# Patient Record
Sex: MALE | Race: WHITE | NOT HISPANIC OR LATINO | URBAN - METROPOLITAN AREA
[De-identification: names, ages, dates, MRNs, and addresses within clinical notes are randomized per-mention and may not be internally consistent; named-entity substitution may affect disease eponyms.]

---

## 2020-08-17 ENCOUNTER — INPATIENT HOSPITAL (OUTPATIENT)
Dept: URBAN - METROPOLITAN AREA HOSPITAL 107 | Facility: HOSPITAL | Age: 55
End: 2020-08-17
Payer: COMMERCIAL

## 2020-08-17 DIAGNOSIS — K29.50 UNSPECIFIED CHRONIC GASTRITIS WITHOUT BLEEDING: ICD-10-CM

## 2020-08-17 DIAGNOSIS — K92.0 HEMATEMESIS: ICD-10-CM

## 2020-08-17 DIAGNOSIS — K21.0 GASTRO-ESOPHAGEAL REFLUX DISEASE WITH ESOPHAGITIS: ICD-10-CM

## 2020-08-17 DIAGNOSIS — F10.10 ALCOHOL ABUSE, UNCOMPLICATED: ICD-10-CM

## 2020-08-17 PROCEDURE — 43239 EGD BIOPSY SINGLE/MULTIPLE: CPT | Performed by: INTERNAL MEDICINE

## 2020-09-05 ENCOUNTER — INPATIENT HOSPITAL (OUTPATIENT)
Dept: URBAN - METROPOLITAN AREA HOSPITAL 107 | Facility: HOSPITAL | Age: 55
End: 2020-09-05
Payer: COMMERCIAL

## 2020-09-05 DIAGNOSIS — F10.10 ALCOHOL ABUSE, UNCOMPLICATED: ICD-10-CM

## 2020-09-05 DIAGNOSIS — K21.0 GASTRO-ESOPHAGEAL REFLUX DISEASE WITH ESOPHAGITIS: ICD-10-CM

## 2020-09-05 DIAGNOSIS — F19.10 OTHER PSYCHOACTIVE SUBSTANCE ABUSE, UNCOMPLICATED: ICD-10-CM

## 2020-09-05 DIAGNOSIS — R93.3 ABNORMAL FINDINGS ON DIAGNOSTIC IMAGING OF OTHER PARTS OF DI: ICD-10-CM

## 2020-09-05 DIAGNOSIS — K76.0 FATTY (CHANGE OF) LIVER, NOT ELSEWHERE CLASSIFIED: ICD-10-CM

## 2020-09-05 PROCEDURE — 99222 1ST HOSP IP/OBS MODERATE 55: CPT | Performed by: INTERNAL MEDICINE

## 2020-09-06 ENCOUNTER — INPATIENT HOSPITAL (OUTPATIENT)
Dept: URBAN - METROPOLITAN AREA HOSPITAL 107 | Facility: HOSPITAL | Age: 55
End: 2020-09-06
Payer: COMMERCIAL

## 2020-09-06 DIAGNOSIS — K21.0 GASTRO-ESOPHAGEAL REFLUX DISEASE WITH ESOPHAGITIS: ICD-10-CM

## 2020-09-06 DIAGNOSIS — B18.2 CHRONIC VIRAL HEPATITIS C: ICD-10-CM

## 2020-09-06 PROCEDURE — 99232 SBSQ HOSP IP/OBS MODERATE 35: CPT | Performed by: INTERNAL MEDICINE

## 2020-09-08 ENCOUNTER — INPATIENT HOSPITAL (OUTPATIENT)
Dept: URBAN - METROPOLITAN AREA HOSPITAL 107 | Facility: HOSPITAL | Age: 55
End: 2020-09-08
Payer: COMMERCIAL

## 2020-09-08 DIAGNOSIS — I26.99 OTHER PULMONARY EMBOLISM WITHOUT ACUTE COR PULMONALE: ICD-10-CM

## 2020-09-08 DIAGNOSIS — B19.20 UNSPECIFIED VIRAL HEPATITIS C WITHOUT HEPATIC COMA: ICD-10-CM

## 2020-09-08 DIAGNOSIS — K21.0 GASTRO-ESOPHAGEAL REFLUX DISEASE WITH ESOPHAGITIS: ICD-10-CM

## 2020-09-08 DIAGNOSIS — K92.0 HEMATEMESIS: ICD-10-CM

## 2020-09-08 DIAGNOSIS — K92.2 GASTROINTESTINAL HEMORRHAGE, UNSPECIFIED: ICD-10-CM

## 2020-09-08 DIAGNOSIS — R10.13 EPIGASTRIC PAIN: ICD-10-CM

## 2020-09-08 PROCEDURE — 99232 SBSQ HOSP IP/OBS MODERATE 35: CPT | Performed by: PHYSICIAN ASSISTANT

## 2021-08-25 PROCEDURE — 99283 EMERGENCY DEPT VISIT LOW MDM: CPT

## 2021-08-25 RX ORDER — SODIUM CHLORIDE 0.9 % (FLUSH) 0.9 %
10 SYRINGE (ML) INJECTION AS NEEDED
Status: DISCONTINUED | OUTPATIENT
Start: 2021-08-25 | End: 2021-08-26 | Stop reason: HOSPADM

## 2021-08-26 ENCOUNTER — APPOINTMENT (OUTPATIENT)
Dept: CT IMAGING | Facility: HOSPITAL | Age: 56
End: 2021-08-26

## 2021-08-26 ENCOUNTER — HOSPITAL ENCOUNTER (EMERGENCY)
Facility: HOSPITAL | Age: 56
Discharge: HOME | End: 2021-08-26
Attending: EMERGENCY MEDICINE | Admitting: EMERGENCY MEDICINE

## 2021-08-26 VITALS
RESPIRATION RATE: 18 BRPM | WEIGHT: 110 LBS | HEART RATE: 84 BPM | SYSTOLIC BLOOD PRESSURE: 113 MMHG | BODY MASS INDEX: 15.75 KG/M2 | OXYGEN SATURATION: 94 % | DIASTOLIC BLOOD PRESSURE: 78 MMHG | TEMPERATURE: 98.2 F | HEIGHT: 70 IN

## 2021-08-26 DIAGNOSIS — K20.90 ESOPHAGITIS: Primary | ICD-10-CM

## 2021-08-26 DIAGNOSIS — R10.84 GENERALIZED ABDOMINAL PAIN: ICD-10-CM

## 2021-08-26 LAB
ALBUMIN SERPL-MCNC: 4.6 G/DL (ref 3.5–5.2)
ALBUMIN/GLOB SERPL: 1.3 G/DL
ALP SERPL-CCNC: 112 U/L (ref 39–117)
ALT SERPL W P-5'-P-CCNC: 31 U/L (ref 1–41)
AMMONIA BLD-SCNC: 30 UMOL/L (ref 16–60)
ANION GAP SERPL CALCULATED.3IONS-SCNC: 8.9 MMOL/L (ref 5–15)
AST SERPL-CCNC: 23 U/L (ref 1–40)
BASOPHILS # BLD AUTO: 0.07 10*3/MM3 (ref 0–0.2)
BASOPHILS NFR BLD AUTO: 0.8 % (ref 0–1.5)
BILIRUB SERPL-MCNC: 0.9 MG/DL (ref 0–1.2)
BILIRUB UR QL STRIP: NEGATIVE
BUN SERPL-MCNC: 26 MG/DL (ref 6–20)
BUN/CREAT SERPL: 20.8 (ref 7–25)
CALCIUM SPEC-SCNC: 9.5 MG/DL (ref 8.6–10.5)
CHLORIDE SERPL-SCNC: 102 MMOL/L (ref 98–107)
CLARITY UR: CLEAR
CO2 SERPL-SCNC: 25.1 MMOL/L (ref 22–29)
COLOR UR: YELLOW
CREAT SERPL-MCNC: 1.25 MG/DL (ref 0.76–1.27)
D-LACTATE SERPL-SCNC: 0.9 MMOL/L (ref 0.5–2)
DEPRECATED RDW RBC AUTO: 45.9 FL (ref 37–54)
EOSINOPHIL # BLD AUTO: 0.21 10*3/MM3 (ref 0–0.4)
EOSINOPHIL NFR BLD AUTO: 2.5 % (ref 0.3–6.2)
ERYTHROCYTE [DISTWIDTH] IN BLOOD BY AUTOMATED COUNT: 14.1 % (ref 12.3–15.4)
GFR SERPL CREATININE-BSD FRML MDRD: 60 ML/MIN/1.73
GLOBULIN UR ELPH-MCNC: 3.6 GM/DL
GLUCOSE SERPL-MCNC: 90 MG/DL (ref 65–99)
GLUCOSE UR STRIP-MCNC: NEGATIVE MG/DL
HCT VFR BLD AUTO: 44.8 % (ref 37.5–51)
HGB BLD-MCNC: 14.7 G/DL (ref 13–17.7)
HGB UR QL STRIP.AUTO: NEGATIVE
HOLD SPECIMEN: NORMAL
HOLD SPECIMEN: NORMAL
IMM GRANULOCYTES # BLD AUTO: 0.02 10*3/MM3 (ref 0–0.05)
IMM GRANULOCYTES NFR BLD AUTO: 0.2 % (ref 0–0.5)
KETONES UR QL STRIP: NEGATIVE
LEUKOCYTE ESTERASE UR QL STRIP.AUTO: NEGATIVE
LIPASE SERPL-CCNC: 94 U/L (ref 13–60)
LYMPHOCYTES # BLD AUTO: 2.43 10*3/MM3 (ref 0.7–3.1)
LYMPHOCYTES NFR BLD AUTO: 28.7 % (ref 19.6–45.3)
MCH RBC QN AUTO: 29.4 PG (ref 26.6–33)
MCHC RBC AUTO-ENTMCNC: 32.8 G/DL (ref 31.5–35.7)
MCV RBC AUTO: 89.6 FL (ref 79–97)
MONOCYTES # BLD AUTO: 1.16 10*3/MM3 (ref 0.1–0.9)
MONOCYTES NFR BLD AUTO: 13.7 % (ref 5–12)
NEUTROPHILS NFR BLD AUTO: 4.58 10*3/MM3 (ref 1.7–7)
NEUTROPHILS NFR BLD AUTO: 54.1 % (ref 42.7–76)
NITRITE UR QL STRIP: NEGATIVE
NRBC BLD AUTO-RTO: 0 /100 WBC (ref 0–0.2)
PH UR STRIP.AUTO: 5.5 [PH] (ref 5–8)
PLATELET # BLD AUTO: 616 10*3/MM3 (ref 140–450)
PMV BLD AUTO: 9.4 FL (ref 6–12)
POTASSIUM SERPL-SCNC: 4.2 MMOL/L (ref 3.5–5.2)
PROT SERPL-MCNC: 8.2 G/DL (ref 6–8.5)
PROT UR QL STRIP: NEGATIVE
RBC # BLD AUTO: 5 10*6/MM3 (ref 4.14–5.8)
SODIUM SERPL-SCNC: 136 MMOL/L (ref 136–145)
SP GR UR STRIP: 1.02 (ref 1–1.03)
UROBILINOGEN UR QL STRIP: NORMAL
WBC # BLD AUTO: 8.47 10*3/MM3 (ref 3.4–10.8)
WHOLE BLOOD HOLD SPECIMEN: NORMAL
WHOLE BLOOD HOLD SPECIMEN: NORMAL

## 2021-08-26 PROCEDURE — 80053 COMPREHEN METABOLIC PANEL: CPT

## 2021-08-26 PROCEDURE — 85025 COMPLETE CBC W/AUTO DIFF WBC: CPT

## 2021-08-26 PROCEDURE — 25010000002 IOPAMIDOL 61 % SOLUTION: Performed by: EMERGENCY MEDICINE

## 2021-08-26 PROCEDURE — 74177 CT ABD & PELVIS W/CONTRAST: CPT

## 2021-08-26 PROCEDURE — 81003 URINALYSIS AUTO W/O SCOPE: CPT | Performed by: EMERGENCY MEDICINE

## 2021-08-26 PROCEDURE — 83605 ASSAY OF LACTIC ACID: CPT

## 2021-08-26 PROCEDURE — 83690 ASSAY OF LIPASE: CPT

## 2021-08-26 PROCEDURE — 82140 ASSAY OF AMMONIA: CPT

## 2021-08-26 RX ORDER — FAMOTIDINE 40 MG/1
40 TABLET, FILM COATED ORAL DAILY
Qty: 30 TABLET | Refills: 0 | Status: SHIPPED | OUTPATIENT
Start: 2021-08-26 | End: 2021-09-15 | Stop reason: HOSPADM

## 2021-08-26 RX ORDER — LIDOCAINE HYDROCHLORIDE 20 MG/ML
15 SOLUTION OROPHARYNGEAL ONCE
Status: COMPLETED | OUTPATIENT
Start: 2021-08-26 | End: 2021-08-26

## 2021-08-26 RX ORDER — ONDANSETRON 4 MG/1
4 TABLET, ORALLY DISINTEGRATING ORAL EVERY 6 HOURS PRN
Qty: 12 TABLET | Refills: 0 | Status: SHIPPED | OUTPATIENT
Start: 2021-08-26 | End: 2021-09-15 | Stop reason: HOSPADM

## 2021-08-26 RX ORDER — ALUMINA, MAGNESIA, AND SIMETHICONE 2400; 2400; 240 MG/30ML; MG/30ML; MG/30ML
15 SUSPENSION ORAL ONCE
Status: COMPLETED | OUTPATIENT
Start: 2021-08-26 | End: 2021-08-26

## 2021-08-26 RX ORDER — SODIUM CHLORIDE 0.9 % (FLUSH) 0.9 %
10 SYRINGE (ML) INJECTION AS NEEDED
Status: DISCONTINUED | OUTPATIENT
Start: 2021-08-26 | End: 2021-08-26 | Stop reason: HOSPADM

## 2021-08-26 RX ORDER — QUETIAPINE FUMARATE 300 MG/1
300 TABLET, FILM COATED ORAL NIGHTLY
Status: ON HOLD | COMMUNITY
End: 2021-09-15 | Stop reason: SDUPTHER

## 2021-08-26 RX ORDER — MULTIPLE VITAMINS W/ MINERALS TAB 9MG-400MCG
1 TAB ORAL DAILY
Status: ON HOLD | COMMUNITY
End: 2021-09-15 | Stop reason: SDUPTHER

## 2021-08-26 RX ADMIN — SODIUM CHLORIDE 1000 ML: 9 INJECTION, SOLUTION INTRAVENOUS at 07:32

## 2021-08-26 RX ADMIN — LIDOCAINE HYDROCHLORIDE 15 ML: 20 SOLUTION ORAL; TOPICAL at 10:51

## 2021-08-26 RX ADMIN — MAGNESIUM HYDROXIDE,ALUMINUM HYDROXICE,SIMETHICONE 15 ML: 240; 2400; 2400 SUSPENSION ORAL at 10:50

## 2021-08-26 RX ADMIN — IOPAMIDOL 85 ML: 612 INJECTION, SOLUTION INTRAVENOUS at 08:01

## 2021-08-26 NOTE — ED NOTES
Gave pt cup for urine. States he cannot go at this time.         Zabrina Payton, RN  08/26/21 0431

## 2021-08-26 NOTE — DISCHARGE INSTRUCTIONS
Medications as ordered  Attempted to stop drinking alcohol or at least slow down  Increase fluids, diet as tolerated  Follow up with PMD and GI of your choice may need a upper GI scope if her symptoms persist  Return to the ER for fever, chills, chest pain, shortness of breath, nausea, vomiting, diarrhea, worsening abdominal pain or any new or worsening symptoms    Community Clinics available for follow up:      [facility] [place]             [address] [facility]  [phone]    [facility]  [address]  [phone]    [address]  [phone]    [facility]  [address]  [phone]    [facility]  [address] [facility]  [phone]    [facility]  [address]  [phone]    [address]  (off [facility])  [phone]    [facility] [address]  [phone]    [facility]  [address] [facility]  [phone]    [facility]  [phone]    [facility]  [address]  [phone]    [facility] [place]  [address] [facility]  [phone]    [facility]  [address]  [phone]

## 2021-08-26 NOTE — ED PROVIDER NOTES
Brief history of present illness: 56-year-old chronic alcoholic with history of hepatitis presents emergency department with rather diffuse abdominal discomfort complaints.    Physical exam:     ED Triage Vitals   Temp Heart Rate Resp BP SpO2   08/25/21 2235 08/25/21 2232 08/25/21 2232 08/26/21 0433 08/25/21 2232   98.1 °F (36.7 °C) 97 18 108/68 97 %      Temp src Heart Rate Source Patient Position BP Location FiO2 (%)   08/25/21 2235 08/25/21 2232 08/26/21 0433 08/26/21 0433 --   Tympanic Monitor Sitting Left arm      Alert and talkative.  Not overtly toxic.  Chronically ill-appearing.  Diffuse discomfort with palpation without rebound or rigidity of the abdomen noted.  No respiratory distress or tachypnea.  Pink warm well perfused.  Moves all extremities equally.    MDM:    Results for orders placed or performed during the hospital encounter of 08/26/21   Comprehensive Metabolic Panel    Specimen: Blood   Result Value Ref Range    Glucose 90 65 - 99 mg/dL    BUN 26 (H) 6 - 20 mg/dL    Creatinine 1.25 0.76 - 1.27 mg/dL    Sodium 136 136 - 145 mmol/L    Potassium 4.2 3.5 - 5.2 mmol/L    Chloride 102 98 - 107 mmol/L    CO2 25.1 22.0 - 29.0 mmol/L    Calcium 9.5 8.6 - 10.5 mg/dL    Total Protein 8.2 6.0 - 8.5 g/dL    Albumin 4.60 3.50 - 5.20 g/dL    ALT (SGPT) 31 1 - 41 U/L    AST (SGOT) 23 1 - 40 U/L    Alkaline Phosphatase 112 39 - 117 U/L    Total Bilirubin 0.9 0.0 - 1.2 mg/dL    eGFR Non African Amer 60 (L) >60 mL/min/1.73    Globulin 3.6 gm/dL    A/G Ratio 1.3 g/dL    BUN/Creatinine Ratio 20.8 7.0 - 25.0    Anion Gap 8.9 5.0 - 15.0 mmol/L   Lipase    Specimen: Blood   Result Value Ref Range    Lipase 94 (H) 13 - 60 U/L   Lactic Acid, Plasma    Specimen: Blood   Result Value Ref Range    Lactate 0.9 0.5 - 2.0 mmol/L   CBC Auto Differential    Specimen: Blood   Result Value Ref Range    WBC 8.47 3.40 - 10.80 10*3/mm3    RBC 5.00 4.14 - 5.80 10*6/mm3    Hemoglobin 14.7 13.0 - 17.7 g/dL    Hematocrit 44.8 37.5 - 51.0  %    MCV 89.6 79.0 - 97.0 fL    MCH 29.4 26.6 - 33.0 pg    MCHC 32.8 31.5 - 35.7 g/dL    RDW 14.1 12.3 - 15.4 %    RDW-SD 45.9 37.0 - 54.0 fl    MPV 9.4 6.0 - 12.0 fL    Platelets 616 (H) 140 - 450 10*3/mm3    Neutrophil % 54.1 42.7 - 76.0 %    Lymphocyte % 28.7 19.6 - 45.3 %    Monocyte % 13.7 (H) 5.0 - 12.0 %    Eosinophil % 2.5 0.3 - 6.2 %    Basophil % 0.8 0.0 - 1.5 %    Immature Grans % 0.2 0.0 - 0.5 %    Neutrophils, Absolute 4.58 1.70 - 7.00 10*3/mm3    Lymphocytes, Absolute 2.43 0.70 - 3.10 10*3/mm3    Monocytes, Absolute 1.16 (H) 0.10 - 0.90 10*3/mm3    Eosinophils, Absolute 0.21 0.00 - 0.40 10*3/mm3    Basophils, Absolute 0.07 0.00 - 0.20 10*3/mm3    Immature Grans, Absolute 0.02 0.00 - 0.05 10*3/mm3    nRBC 0.0 0.0 - 0.2 /100 WBC   Ammonia    Specimen: Blood   Result Value Ref Range    Ammonia 30 16 - 60 umol/L   Green Top (Gel)   Result Value Ref Range    Extra Tube Hold for add-ons.    Lavender Top   Result Value Ref Range    Extra Tube hold for add-on    Gold Top - SST   Result Value Ref Range    Extra Tube Hold for add-ons.    Light Blue Top   Result Value Ref Range    Extra Tube hold for add-on      Agree with plan for radiologic imaging in this patient to evaluate for acute life threat.    I have seen and personally evaluated this patient, discussed the case with the treating advanced practice provider, and reviewed their note. I was involved in the medical decision making during the evaluation, testing and disposition planning for this patient.     Jensen Anders MD  08/26/21 0780

## 2021-08-26 NOTE — ED NOTES
This RN wearing all appropriate PPE during patient encounter. Hand hygiene performed before and during entering room.       Sangeeta Cosme, RN  08/26/21 0779

## 2021-08-26 NOTE — ED NOTES
This RN wearing all appropriate PPE during patient encounter. Hand hygiene performed before and during entering room.       Sangeeta Cosme, RN  08/26/21 0841

## 2021-08-26 NOTE — ED PROVIDER NOTES
EMERGENCY DEPARTMENT ENCOUNTER    Room Number:  04/04  Date of encounter:  8/26/2021  PCP: Provider, No Known  Historian: Patient      PPE    Patient was placed in face mask in first look. Patient was wearing facemask when I entered the room and throughout our encounter. I wore full protective equipment throughout this patient encounter including a face mask, and gloves. Hand hygiene was performed before donning protective equipment and after removal when leaving the room.        HPI:  Chief Complaint: Abdominal pain  A complete HPI/ROS/PMH/PSH/SH/FH are unobtainable due to: Nothing    Context: David Villalta is a 56 y.o. male who arrives to the ED via private vehicle.  Patient presents with c/o mild, intermittent, dull diffuse abdominal pain for the past week.   Patient also complains of vomiting.  Patient denies fever, chills, diarrhea, dysuria.  Patient states that nothing makes the symptoms better and nothing worsens symptoms. Patient states he is not had pain like this in the past. He states that he does drink alcohol but not every day.        PAST MEDICAL HISTORY  Active Ambulatory Problems     Diagnosis Date Noted   • No Active Ambulatory Problems     Resolved Ambulatory Problems     Diagnosis Date Noted   • No Resolved Ambulatory Problems     Past Medical History:   Diagnosis Date   • Anxiety    • Depression    • Hepatitis C    • Hx of drug abuse (CMS/HCC)          PAST SURGICAL HISTORY  Past Surgical History:   Procedure Laterality Date   • ANKLE SURGERY     • SPLENECTOMY           FAMILY HISTORY  History reviewed. No pertinent family history.      SOCIAL HISTORY  Social History     Socioeconomic History   • Marital status: Unknown     Spouse name: Not on file   • Number of children: Not on file   • Years of education: Not on file   • Highest education level: Not on file   Tobacco Use   • Smoking status: Current Every Day Smoker   • Smokeless tobacco: Never Used   Substance and Sexual Activity   • Alcohol  "use: Yes     Comment: \"3 cases of beer a week & 5-6 bottles of wine. Sometimes some 5ths.\"   • Drug use: Yes     Comment: \"meth\"   • Sexual activity: Defer         ALLERGIES  Penicillin g        REVIEW OF SYSTEMS  Review of Systems     All systems reviewed and negative except for those discussed in HPI.        PHYSICAL EXAM    ED Triage Vitals   Temp Heart Rate Resp BP SpO2   08/25/21 2235 08/25/21 2232 08/25/21 2232 08/26/21 0433 08/25/21 2232   98.1 °F (36.7 °C) 97 18 108/68 97 %       Physical Exam  GENERAL: Well appearing, non-toxic appearing, not distressed  HENT: normocephalic, atraumatic  EYES: no scleral icterus, PERRL  CV: regular rhythm, regular rate, no murmur  RESPIRATORY: normal effort, CTAB  ABDOMEN: soft, normal bowel sounds, diffuse left sided abdominal pain, no rebound, guarding or rigidity  No CVA or flank tenderness bilaterally  MUSCULOSKELETAL: no deformity  NEURO: alert, moves all extremities, follows commands, mental status normal/baseline  SKIN: warm, dry, no rash   Psych: Appropriate mood and affect  Nursing notes and vital signs reviewed      LAB RESULTS  Recent Results (from the past 24 hour(s))   Comprehensive Metabolic Panel    Collection Time: 08/26/21  4:28 AM    Specimen: Blood   Result Value Ref Range    Glucose 90 65 - 99 mg/dL    BUN 26 (H) 6 - 20 mg/dL    Creatinine 1.25 0.76 - 1.27 mg/dL    Sodium 136 136 - 145 mmol/L    Potassium 4.2 3.5 - 5.2 mmol/L    Chloride 102 98 - 107 mmol/L    CO2 25.1 22.0 - 29.0 mmol/L    Calcium 9.5 8.6 - 10.5 mg/dL    Total Protein 8.2 6.0 - 8.5 g/dL    Albumin 4.60 3.50 - 5.20 g/dL    ALT (SGPT) 31 1 - 41 U/L    AST (SGOT) 23 1 - 40 U/L    Alkaline Phosphatase 112 39 - 117 U/L    Total Bilirubin 0.9 0.0 - 1.2 mg/dL    eGFR Non African Amer 60 (L) >60 mL/min/1.73    Globulin 3.6 gm/dL    A/G Ratio 1.3 g/dL    BUN/Creatinine Ratio 20.8 7.0 - 25.0    Anion Gap 8.9 5.0 - 15.0 mmol/L   Lipase    Collection Time: 08/26/21  4:28 AM    Specimen: Blood "   Result Value Ref Range    Lipase 94 (H) 13 - 60 U/L   Lactic Acid, Plasma    Collection Time: 08/26/21  4:28 AM    Specimen: Blood   Result Value Ref Range    Lactate 0.9 0.5 - 2.0 mmol/L   Green Top (Gel)    Collection Time: 08/26/21  4:28 AM   Result Value Ref Range    Extra Tube Hold for add-ons.    Lavender Top    Collection Time: 08/26/21  4:28 AM   Result Value Ref Range    Extra Tube hold for add-on    Gold Top - SST    Collection Time: 08/26/21  4:28 AM   Result Value Ref Range    Extra Tube Hold for add-ons.    Light Blue Top    Collection Time: 08/26/21  4:28 AM   Result Value Ref Range    Extra Tube hold for add-on    CBC Auto Differential    Collection Time: 08/26/21  4:28 AM    Specimen: Blood   Result Value Ref Range    WBC 8.47 3.40 - 10.80 10*3/mm3    RBC 5.00 4.14 - 5.80 10*6/mm3    Hemoglobin 14.7 13.0 - 17.7 g/dL    Hematocrit 44.8 37.5 - 51.0 %    MCV 89.6 79.0 - 97.0 fL    MCH 29.4 26.6 - 33.0 pg    MCHC 32.8 31.5 - 35.7 g/dL    RDW 14.1 12.3 - 15.4 %    RDW-SD 45.9 37.0 - 54.0 fl    MPV 9.4 6.0 - 12.0 fL    Platelets 616 (H) 140 - 450 10*3/mm3    Neutrophil % 54.1 42.7 - 76.0 %    Lymphocyte % 28.7 19.6 - 45.3 %    Monocyte % 13.7 (H) 5.0 - 12.0 %    Eosinophil % 2.5 0.3 - 6.2 %    Basophil % 0.8 0.0 - 1.5 %    Immature Grans % 0.2 0.0 - 0.5 %    Neutrophils, Absolute 4.58 1.70 - 7.00 10*3/mm3    Lymphocytes, Absolute 2.43 0.70 - 3.10 10*3/mm3    Monocytes, Absolute 1.16 (H) 0.10 - 0.90 10*3/mm3    Eosinophils, Absolute 0.21 0.00 - 0.40 10*3/mm3    Basophils, Absolute 0.07 0.00 - 0.20 10*3/mm3    Immature Grans, Absolute 0.02 0.00 - 0.05 10*3/mm3    nRBC 0.0 0.0 - 0.2 /100 WBC   Ammonia    Collection Time: 08/26/21  4:28 AM    Specimen: Blood   Result Value Ref Range    Ammonia 30 16 - 60 umol/L   Urinalysis With Microscopic If Indicated (No Culture) - Urine, Clean Catch    Collection Time: 08/26/21  8:13 AM    Specimen: Urine, Clean Catch   Result Value Ref Range    Color, UA Yellow Yellow,  Straw    Appearance, UA Clear Clear    pH, UA 5.5 5.0 - 8.0    Specific Gravity, UA 1.021 1.005 - 1.030    Glucose, UA Negative Negative    Ketones, UA Negative Negative    Bilirubin, UA Negative Negative    Blood, UA Negative Negative    Protein, UA Negative Negative    Leuk Esterase, UA Negative Negative    Nitrite, UA Negative Negative    Urobilinogen, UA 0.2 E.U./dL 0.2 - 1.0 E.U./dL       Ordered the above labs and independently reviewed the results.      RADIOLOGY  CT Abdomen Pelvis With Contrast    Result Date: 8/26/2021  CT ABDOMEN AND PELVIS WITH IV CONTRAST  HISTORY: Left-sided abdominal pain, nausea; elevated lipase  TECHNIQUE: Radiation dose reduction techniques were utilized, including automated exposure control and exposure modulation based on body size. 3 mm images were obtained through the abdomen and pelvis after the administration of IV contrast.  COMPARISON: None  FINDINGS: Evaluation is moderately suboptimal due to respiratory motion artifact.  The mid to distal esophagus has a mildly thickened appearance; however, is incompletely visualized.  There are no findings of small bowel obstruction. The appendix is unremarkable. The liver, gallbladder, pancreas, adrenal glands and kidneys have an unremarkable postcontrast CT appearance. There is no hydronephrosis.  Postsurgical changes from prior splenectomy are present. There are multiple nodular soft tissue densities throughout the splenectomy bed, likely represent splenosis.  A few mildly enlarged orquidea hepatic node measuring to 1.1 cm are present. There is also an enlarged left common femoral node measuring 1.3 cm in short axis dimension. There is no free intraperitoneal air or fluid.  No suspicious lytic or blastic osseous lesion is present.      1.  Evaluation of the upper abdomen is mild to moderately suboptimal due to respiratory motion artifact. 2.  No focal abnormality is visualized involving the pancreas; however, please note that mild and  early pancreatitis can remain occult on CT. 3.  Apparent mild thickening of the distal esophagus which is incompletely visualized. Findings are suggestive of esophagitis in the appropriate clinical context and correlation with patient history is recommended with follow-up endoscopy if clinically indicated. 4.  Mildly enlarged orquidea hepatic and left pelvic nodes which in the absence of known malignancy are favored be benign; however, they remain indeterminate. Follow-up with CT abdomen and pelvis in 3 months is recommended to ensure stability. 5.  Other findings as above.  This report was finalized on 8/26/2021 8:35 AM by Dr. Fermín Hernandez M.D.        I ordered the above noted radiological studies and viewed the images on the PACS system.         MEDICAL RECORD REVIEW  No relevant medical records reviewed in epic      PROCEDURES    Procedures        DIFFERENTIAL DIAGNOSIS  Differential diagnosis for abdominal pain include but are not limited to the following:    -Hepatobiliary pathology such as cholecystitis, cholangitis, and symptomatic cholelithiasis  -GERD  -Pancreatitis  -Small or large bowel obstruction  -Appendicitis  -Diverticulitis  -UTI including pyelonephritis  -Ureteral stone  -Zoster  -Colitis, including infectious and ischemic  -Atypical ACS              PROGRESS, DATA ANALYSIS, CONSULTS, AND MEDICAL DECISION MAKING        ED Course as of Aug 26 1614   Thu Aug 26, 2021   0709 Discussed pertinent information from history and physical exam with patient.  Discussed differential diagnosis and plan for ED evaluation/work-up and treatment including labs, urinalysis and CT of the abdomen and pelvis to evaluate for causes of left-sided abdominal tenderness..  All questions answered.  Patient is agreeable with this plan.        [MS]   0800 Reviewed pt's history and workup with Dr. Anders.  After a bedside evaluation, they agree with the plan of care.          [MS]   1029 Lipase(!): 94 [MS]   1034 I informed the  patient of the results of the testing done here in the ER today.  Patient's repeat exam, test results and history are not concerning for serious etiology of their abdominal pain.  Did discuss possible esophagitis that was seen on the CAT scan, this could be related to his alcohol use.  Discussed with patient that he should attempt to slow down and even stop drinking.  I explained to the patient the initial treatment plan and I instructed the patient to return to the emergency department if fever develops, worsening of pain, not able to tolerate liquids or worsening of symptoms.  Instructed the patient to follow-up with her physician or specialist for further evaluation, possibly upper endoscopy, he should call to schedule appointments for next week.  The patient understands and agrees with the treatment plan.            [MS]      ED Course User Index  [MS] Lisa Wasserman APRN     Discussed plan for discharge, as there is no emergent indication for admission. Pt/family is agreeable and understands need for follow up and repeat testing.  Pt is aware that discharge does not mean that nothing is wrong but it indicates no emergency is present that requires admission and they must continue care with follow-up as given below or physician of their choice.   Patient/Family voiced understanding of above instructions.  Patient discharged in stable condition.    DIAGNOSIS  Final diagnoses:   Esophagitis   Generalized abdominal pain       FOLLOW UP   PATIENT CONNECTION - [place] [id number] [phone]  Schedule an appointment as soon as possible for a visit in 1 week      Rai Garcias MD  [address] [facility]  [address] [address]  [phone]    Schedule an appointment as soon as possible for a visit        RX     Medication List      New Prescriptions    famotidine 40 MG tablet  Commonly known as: PEPCID  Take 1 tablet by mouth Daily.     ondansetron ODT 4 MG disintegrating  tablet  Commonly known as: ZOFRAN-ODT  Place 1 tablet on the tongue Every 6 (Six) Hours As Needed for Nausea or Vomiting.           Where to Get Your Medications      You can get these medications from any pharmacy    Bring a paper prescription for each of these medications  · famotidine 40 MG tablet  · ondansetron ODT 4 MG disintegrating tablet             MEDICATIONS GIVEN IN ED    Medications   sodium chloride 0.9 % bolus 1,000 mL (0 mL Intravenous Stopped 8/26/21 1101)   iopamidol (ISOVUE-300) 61 % injection 100 mL (85 mL Intravenous Given 8/26/21 0801)   aluminum-magnesium hydroxide-simethicone (MAALOX MAX) 400-400-40 MG/5ML suspension 15 mL (15 mL Oral Given 8/26/21 1050)   Lidocaine Viscous HCl (XYLOCAINE) 2 % mouth solution 15 mL (15 mL Mouth/Throat Given 8/26/21 1051)           COURSE & MEDICAL DECISION MAKING  Any/All labs and Any/All Imaging studies that were ordered were reviewed and are noted above.  Results were reviewed/discussed with the patient and they were also made aware of online access.    Pt also made aware that some labs, such as cultures, will not be resulted during ER visit and follow up with PMD is necessary.        Lisa Wasserman, APRN  08/26/21 8657

## 2021-08-26 NOTE — ED NOTES
"Pt arrived by PV reports, \"my liver is acting up, I have HEP C\" Pt states \"I am off my psych meds\" Pt denies HI/ SI      Patient was placed in face mask during first look triage.  Patient was wearing a face mask throughout encounter.  I wore personal protective equipment throughout the encounter.  Hand hygiene was performed before and after patient encounter.        Winnie Ortiz RN  08/25/21 2232       Winnie Ortiz RN  08/25/21 2233    "

## 2021-09-13 ENCOUNTER — HOSPITAL ENCOUNTER (INPATIENT)
Facility: HOSPITAL | Age: 56
LOS: 2 days | Discharge: HOME | End: 2021-09-15
Attending: EMERGENCY MEDICINE | Admitting: STUDENT IN AN ORGANIZED HEALTH CARE EDUCATION/TRAINING PROGRAM

## 2021-09-13 ENCOUNTER — APPOINTMENT (OUTPATIENT)
Dept: CT IMAGING | Facility: HOSPITAL | Age: 56
End: 2021-09-13

## 2021-09-13 DIAGNOSIS — K92.0 HEMATEMESIS WITH NAUSEA: Primary | ICD-10-CM

## 2021-09-13 DIAGNOSIS — K22.89 ESOPHAGEAL THICKENING: ICD-10-CM

## 2021-09-13 DIAGNOSIS — F10.11 HISTORY OF ALCOHOL ABUSE: ICD-10-CM

## 2021-09-13 PROBLEM — Z86.718 HISTORY OF DVT (DEEP VEIN THROMBOSIS): Status: ACTIVE | Noted: 2021-09-13

## 2021-09-13 PROBLEM — E87.1 HYPONATREMIA: Status: ACTIVE | Noted: 2021-09-13

## 2021-09-13 PROBLEM — D72.829 LEUKOCYTOSIS: Status: ACTIVE | Noted: 2021-09-13

## 2021-09-13 PROBLEM — F10.20 ALCOHOL DEPENDENCE: Status: ACTIVE | Noted: 2021-09-13

## 2021-09-13 LAB
ABO GROUP BLD: NORMAL
ALBUMIN SERPL-MCNC: 4.5 G/DL (ref 3.5–5.2)
ALBUMIN/GLOB SERPL: 1.6 G/DL
ALP SERPL-CCNC: 100 U/L (ref 39–117)
ALT SERPL W P-5'-P-CCNC: 28 U/L (ref 1–41)
AMPHET+METHAMPHET UR QL: NEGATIVE
ANION GAP SERPL CALCULATED.3IONS-SCNC: 12.6 MMOL/L (ref 5–15)
APTT PPP: 27.6 SECONDS (ref 22.7–35.4)
AST SERPL-CCNC: 54 U/L (ref 1–40)
BARBITURATES UR QL SCN: NEGATIVE
BASOPHILS # BLD AUTO: 0.04 10*3/MM3 (ref 0–0.2)
BASOPHILS NFR BLD AUTO: 0.2 % (ref 0–1.5)
BENZODIAZ UR QL SCN: NEGATIVE
BILIRUB SERPL-MCNC: 0.9 MG/DL (ref 0–1.2)
BLD GP AB SCN SERPL QL: NEGATIVE
BUN SERPL-MCNC: 10 MG/DL (ref 6–20)
BUN/CREAT SERPL: 11.8 (ref 7–25)
CALCIUM SPEC-SCNC: 9.3 MG/DL (ref 8.6–10.5)
CANNABINOIDS SERPL QL: NEGATIVE
CHLORIDE SERPL-SCNC: 87 MMOL/L (ref 98–107)
CO2 SERPL-SCNC: 29.4 MMOL/L (ref 22–29)
COCAINE UR QL: NEGATIVE
CREAT SERPL-MCNC: 0.85 MG/DL (ref 0.76–1.27)
DEPRECATED RDW RBC AUTO: 43.9 FL (ref 37–54)
EOSINOPHIL # BLD AUTO: 0 10*3/MM3 (ref 0–0.4)
EOSINOPHIL NFR BLD AUTO: 0 % (ref 0.3–6.2)
ERYTHROCYTE [DISTWIDTH] IN BLOOD BY AUTOMATED COUNT: 14.1 % (ref 12.3–15.4)
ETHANOL BLD-MCNC: <10 MG/DL (ref 0–10)
ETHANOL UR QL: <0.01 %
GFR SERPL CREATININE-BSD FRML MDRD: 94 ML/MIN/1.73
GLOBULIN UR ELPH-MCNC: 2.8 GM/DL
GLUCOSE SERPL-MCNC: 149 MG/DL (ref 65–99)
HCT VFR BLD AUTO: 35.7 % (ref 37.5–51)
HCT VFR BLD AUTO: 41.1 % (ref 37.5–51)
HGB BLD-MCNC: 12.4 G/DL (ref 13–17.7)
HGB BLD-MCNC: 14.4 G/DL (ref 13–17.7)
IMM GRANULOCYTES # BLD AUTO: 0.09 10*3/MM3 (ref 0–0.05)
IMM GRANULOCYTES NFR BLD AUTO: 0.5 % (ref 0–0.5)
INR PPP: 1.06 (ref 0.9–1.1)
LIPASE SERPL-CCNC: 27 U/L (ref 13–60)
LYMPHOCYTES # BLD AUTO: 1.29 10*3/MM3 (ref 0.7–3.1)
LYMPHOCYTES NFR BLD AUTO: 6.9 % (ref 19.6–45.3)
MAGNESIUM SERPL-MCNC: 2 MG/DL (ref 1.6–2.6)
MCH RBC QN AUTO: 30.4 PG (ref 26.6–33)
MCHC RBC AUTO-ENTMCNC: 35 G/DL (ref 31.5–35.7)
MCV RBC AUTO: 86.9 FL (ref 79–97)
METHADONE UR QL SCN: NEGATIVE
MONOCYTES # BLD AUTO: 1.81 10*3/MM3 (ref 0.1–0.9)
MONOCYTES NFR BLD AUTO: 9.7 % (ref 5–12)
NEUTROPHILS NFR BLD AUTO: 15.48 10*3/MM3 (ref 1.7–7)
NEUTROPHILS NFR BLD AUTO: 82.7 % (ref 42.7–76)
NRBC BLD AUTO-RTO: 0 /100 WBC (ref 0–0.2)
OPIATES UR QL: NEGATIVE
OXYCODONE UR QL SCN: NEGATIVE
PLATELET # BLD AUTO: 365 10*3/MM3 (ref 140–450)
PMV BLD AUTO: 9.2 FL (ref 6–12)
POTASSIUM SERPL-SCNC: 3.7 MMOL/L (ref 3.5–5.2)
PROT SERPL-MCNC: 7.3 G/DL (ref 6–8.5)
PROTHROMBIN TIME: 13.6 SECONDS (ref 11.7–14.2)
RBC # BLD AUTO: 4.73 10*6/MM3 (ref 4.14–5.8)
RH BLD: POSITIVE
SARS-COV-2 RNA RESP QL NAA+PROBE: NOT DETECTED
SODIUM SERPL-SCNC: 129 MMOL/L (ref 136–145)
T&S EXPIRATION DATE: NORMAL
WBC # BLD AUTO: 18.71 10*3/MM3 (ref 3.4–10.8)

## 2021-09-13 PROCEDURE — 80053 COMPREHEN METABOLIC PANEL: CPT | Performed by: PHYSICIAN ASSISTANT

## 2021-09-13 PROCEDURE — 80307 DRUG TEST PRSMV CHEM ANLYZR: CPT | Performed by: STUDENT IN AN ORGANIZED HEALTH CARE EDUCATION/TRAINING PROGRAM

## 2021-09-13 PROCEDURE — 25010000002 LORAZEPAM PER 2 MG: Performed by: STUDENT IN AN ORGANIZED HEALTH CARE EDUCATION/TRAINING PROGRAM

## 2021-09-13 PROCEDURE — 86901 BLOOD TYPING SEROLOGIC RH(D): CPT | Performed by: PHYSICIAN ASSISTANT

## 2021-09-13 PROCEDURE — 83690 ASSAY OF LIPASE: CPT | Performed by: PHYSICIAN ASSISTANT

## 2021-09-13 PROCEDURE — 85730 THROMBOPLASTIN TIME PARTIAL: CPT | Performed by: PHYSICIAN ASSISTANT

## 2021-09-13 PROCEDURE — 25010000002 ONDANSETRON PER 1 MG: Performed by: PHYSICIAN ASSISTANT

## 2021-09-13 PROCEDURE — 85025 COMPLETE CBC W/AUTO DIFF WBC: CPT | Performed by: PHYSICIAN ASSISTANT

## 2021-09-13 PROCEDURE — 36415 COLL VENOUS BLD VENIPUNCTURE: CPT | Performed by: NURSE PRACTITIONER

## 2021-09-13 PROCEDURE — 82077 ASSAY SPEC XCP UR&BREATH IA: CPT | Performed by: PHYSICIAN ASSISTANT

## 2021-09-13 PROCEDURE — 80053 COMPREHEN METABOLIC PANEL: CPT | Performed by: NURSE PRACTITIONER

## 2021-09-13 PROCEDURE — 86900 BLOOD TYPING SEROLOGIC ABO: CPT | Performed by: PHYSICIAN ASSISTANT

## 2021-09-13 PROCEDURE — 85027 COMPLETE CBC AUTOMATED: CPT | Performed by: NURSE PRACTITIONER

## 2021-09-13 PROCEDURE — U0003 INFECTIOUS AGENT DETECTION BY NUCLEIC ACID (DNA OR RNA); SEVERE ACUTE RESPIRATORY SYNDROME CORONAVIRUS 2 (SARS-COV-2) (CORONAVIRUS DISEASE [COVID-19]), AMPLIFIED PROBE TECHNIQUE, MAKING USE OF HIGH THROUGHPUT TECHNOLOGIES AS DESCRIBED BY CMS-2020-01-R: HCPCS | Performed by: EMERGENCY MEDICINE

## 2021-09-13 PROCEDURE — 25010000002 ONDANSETRON PER 1 MG: Performed by: NURSE PRACTITIONER

## 2021-09-13 PROCEDURE — 99285 EMERGENCY DEPT VISIT HI MDM: CPT

## 2021-09-13 PROCEDURE — 74177 CT ABD & PELVIS W/CONTRAST: CPT

## 2021-09-13 PROCEDURE — 25010000002 LORAZEPAM PER 2 MG: Performed by: EMERGENCY MEDICINE

## 2021-09-13 PROCEDURE — 85018 HEMOGLOBIN: CPT | Performed by: NURSE PRACTITIONER

## 2021-09-13 PROCEDURE — 99222 1ST HOSP IP/OBS MODERATE 55: CPT | Performed by: INTERNAL MEDICINE

## 2021-09-13 PROCEDURE — 90791 PSYCH DIAGNOSTIC EVALUATION: CPT

## 2021-09-13 PROCEDURE — 85610 PROTHROMBIN TIME: CPT | Performed by: PHYSICIAN ASSISTANT

## 2021-09-13 PROCEDURE — 25010000002 IOPAMIDOL 61 % SOLUTION: Performed by: EMERGENCY MEDICINE

## 2021-09-13 PROCEDURE — 83735 ASSAY OF MAGNESIUM: CPT | Performed by: PHYSICIAN ASSISTANT

## 2021-09-13 PROCEDURE — 86850 RBC ANTIBODY SCREEN: CPT | Performed by: PHYSICIAN ASSISTANT

## 2021-09-13 PROCEDURE — 85014 HEMATOCRIT: CPT | Performed by: NURSE PRACTITIONER

## 2021-09-13 RX ORDER — PANTOPRAZOLE SODIUM 40 MG/10ML
80 INJECTION, POWDER, LYOPHILIZED, FOR SOLUTION INTRAVENOUS ONCE
Status: COMPLETED | OUTPATIENT
Start: 2021-09-13 | End: 2021-09-13

## 2021-09-13 RX ORDER — ONDANSETRON 2 MG/ML
4 INJECTION INTRAMUSCULAR; INTRAVENOUS ONCE
Status: COMPLETED | OUTPATIENT
Start: 2021-09-13 | End: 2021-09-13

## 2021-09-13 RX ORDER — LORAZEPAM 2 MG/ML
2 INJECTION INTRAMUSCULAR
Status: DISCONTINUED | OUTPATIENT
Start: 2021-09-13 | End: 2021-09-15 | Stop reason: HOSPADM

## 2021-09-13 RX ORDER — LORAZEPAM 2 MG/ML
1 INJECTION INTRAMUSCULAR
Status: DISCONTINUED | OUTPATIENT
Start: 2021-09-13 | End: 2021-09-15 | Stop reason: HOSPADM

## 2021-09-13 RX ORDER — LORAZEPAM 1 MG/1
1 TABLET ORAL
Status: DISCONTINUED | OUTPATIENT
Start: 2021-09-13 | End: 2021-09-15 | Stop reason: HOSPADM

## 2021-09-13 RX ORDER — ACETAMINOPHEN 160 MG/5ML
650 SOLUTION ORAL EVERY 4 HOURS PRN
Status: DISCONTINUED | OUTPATIENT
Start: 2021-09-13 | End: 2021-09-15 | Stop reason: HOSPADM

## 2021-09-13 RX ORDER — ACETAMINOPHEN 650 MG/1
650 SUPPOSITORY RECTAL EVERY 4 HOURS PRN
Status: DISCONTINUED | OUTPATIENT
Start: 2021-09-13 | End: 2021-09-15 | Stop reason: HOSPADM

## 2021-09-13 RX ORDER — LORAZEPAM 1 MG/1
2 TABLET ORAL
Status: DISCONTINUED | OUTPATIENT
Start: 2021-09-13 | End: 2021-09-15 | Stop reason: HOSPADM

## 2021-09-13 RX ORDER — LORAZEPAM 2 MG/ML
1 INJECTION INTRAMUSCULAR ONCE
Status: COMPLETED | OUTPATIENT
Start: 2021-09-13 | End: 2021-09-13

## 2021-09-13 RX ORDER — ONDANSETRON 2 MG/ML
4 INJECTION INTRAMUSCULAR; INTRAVENOUS EVERY 6 HOURS PRN
Status: DISCONTINUED | OUTPATIENT
Start: 2021-09-13 | End: 2021-09-15 | Stop reason: HOSPADM

## 2021-09-13 RX ORDER — MULTIPLE VITAMINS W/ MINERALS TAB 9MG-400MCG
1 TAB ORAL DAILY
Status: DISCONTINUED | OUTPATIENT
Start: 2021-09-13 | End: 2021-09-15 | Stop reason: HOSPADM

## 2021-09-13 RX ORDER — ONDANSETRON 4 MG/1
4 TABLET, FILM COATED ORAL EVERY 6 HOURS PRN
Status: DISCONTINUED | OUTPATIENT
Start: 2021-09-13 | End: 2021-09-15 | Stop reason: HOSPADM

## 2021-09-13 RX ORDER — NITROGLYCERIN 0.4 MG/1
0.4 TABLET SUBLINGUAL
Status: DISCONTINUED | OUTPATIENT
Start: 2021-09-13 | End: 2021-09-15 | Stop reason: HOSPADM

## 2021-09-13 RX ORDER — SODIUM CHLORIDE 9 MG/ML
125 INJECTION, SOLUTION INTRAVENOUS CONTINUOUS
Status: DISCONTINUED | OUTPATIENT
Start: 2021-09-13 | End: 2021-09-15

## 2021-09-13 RX ORDER — DIPHENOXYLATE HYDROCHLORIDE AND ATROPINE SULFATE 2.5; .025 MG/1; MG/1
1 TABLET ORAL DAILY
Status: DISCONTINUED | OUTPATIENT
Start: 2021-09-14 | End: 2021-09-15 | Stop reason: HOSPADM

## 2021-09-13 RX ORDER — SODIUM CHLORIDE, SODIUM LACTATE, POTASSIUM CHLORIDE, CALCIUM CHLORIDE 600; 310; 30; 20 MG/100ML; MG/100ML; MG/100ML; MG/100ML
30 INJECTION, SOLUTION INTRAVENOUS CONTINUOUS
Status: CANCELLED | OUTPATIENT
Start: 2021-09-14

## 2021-09-13 RX ORDER — ACETAMINOPHEN 325 MG/1
650 TABLET ORAL EVERY 4 HOURS PRN
Status: DISCONTINUED | OUTPATIENT
Start: 2021-09-13 | End: 2021-09-15 | Stop reason: HOSPADM

## 2021-09-13 RX ORDER — FOLIC ACID 1 MG/1
1 TABLET ORAL DAILY
Status: DISCONTINUED | OUTPATIENT
Start: 2021-09-14 | End: 2021-09-15 | Stop reason: HOSPADM

## 2021-09-13 RX ORDER — SODIUM CHLORIDE 0.9 % (FLUSH) 0.9 %
10 SYRINGE (ML) INJECTION AS NEEDED
Status: DISCONTINUED | OUTPATIENT
Start: 2021-09-13 | End: 2021-09-15 | Stop reason: HOSPADM

## 2021-09-13 RX ADMIN — PANTOPRAZOLE SODIUM 80 MG: 40 INJECTION, POWDER, FOR SOLUTION INTRAVENOUS at 08:03

## 2021-09-13 RX ADMIN — ONDANSETRON 4 MG: 2 INJECTION INTRAMUSCULAR; INTRAVENOUS at 18:12

## 2021-09-13 RX ADMIN — LORAZEPAM 1 MG: 2 INJECTION INTRAMUSCULAR; INTRAVENOUS at 15:24

## 2021-09-13 RX ADMIN — LORAZEPAM 2 MG: 2 INJECTION INTRAMUSCULAR; INTRAVENOUS at 10:19

## 2021-09-13 RX ADMIN — Medication 100 MG: at 10:16

## 2021-09-13 RX ADMIN — IOPAMIDOL 85 ML: 612 INJECTION, SOLUTION INTRAVENOUS at 07:41

## 2021-09-13 RX ADMIN — LORAZEPAM 1 MG: 2 INJECTION INTRAMUSCULAR; INTRAVENOUS at 08:26

## 2021-09-13 RX ADMIN — LORAZEPAM 1 MG: 1 TABLET ORAL at 21:11

## 2021-09-13 RX ADMIN — SODIUM CHLORIDE 125 ML/HR: 9 INJECTION, SOLUTION INTRAVENOUS at 10:20

## 2021-09-13 RX ADMIN — SODIUM CHLORIDE 8 MG/HR: 900 INJECTION, SOLUTION INTRAVENOUS at 20:49

## 2021-09-13 RX ADMIN — QUETIAPINE FUMARATE 300 MG: 100 TABLET ORAL at 21:31

## 2021-09-13 RX ADMIN — SODIUM CHLORIDE 8 MG/HR: 900 INJECTION, SOLUTION INTRAVENOUS at 15:14

## 2021-09-13 RX ADMIN — SODIUM CHLORIDE 1000 ML: 9 INJECTION, SOLUTION INTRAVENOUS at 06:55

## 2021-09-13 RX ADMIN — LORAZEPAM 1 MG: 2 INJECTION INTRAMUSCULAR; INTRAVENOUS at 18:14

## 2021-09-13 RX ADMIN — SODIUM CHLORIDE 8 MG/HR: 900 INJECTION, SOLUTION INTRAVENOUS at 08:04

## 2021-09-13 RX ADMIN — SODIUM CHLORIDE 125 ML/HR: 9 INJECTION, SOLUTION INTRAVENOUS at 19:54

## 2021-09-13 RX ADMIN — LORAZEPAM 2 MG: 2 INJECTION INTRAMUSCULAR; INTRAVENOUS at 12:16

## 2021-09-13 RX ADMIN — ONDANSETRON 4 MG: 2 INJECTION INTRAMUSCULAR; INTRAVENOUS at 06:56

## 2021-09-13 NOTE — ED TRIAGE NOTES
Pt to ED via EMS from a local [facility]. Pt is curretly without a primary residence. EMS reports they were called because the pt had been vomiting coffee ground emesis and also had some bright red vomiting in route. Pt is a long standing alcoholic and report history of esophageal varices. Pt denies EtOh tonight.     This RN in appropriate PPE for all patient care interactions. Pt masked and redirected for proper mask use when necessary. Hand hygiene performed before and after all patient care interactions.

## 2021-09-13 NOTE — PLAN OF CARE
Problem: Adult Inpatient Plan of Care  Goal: Absence of Hospital-Acquired Illness or Injury  Intervention: Identify and Manage Fall Risk  Recent Flowsheet Documentation  Taken 9/13/2021 1736 by Nelda Murphy RN  Safety Promotion/Fall Prevention:   activity supervised   nonskid shoes/slippers when out of bed     Problem: Adult Inpatient Plan of Care  Goal: Absence of Hospital-Acquired Illness or Injury  Intervention: Prevent and Manage VTE (venous thromboembolism) Risk  Recent Flowsheet Documentation  Taken 9/13/2021 1736 by Nelda Murphy RN  VTE Prevention/Management:   bilateral   dorsiflexion/plantar flexion performed   Goal Outcome Evaluation:  Plan of Care Reviewed With: patient        Progress: improving

## 2021-09-13 NOTE — CONSULTS
"  Access Center Consult. Full PPE worn throughout encounter including mask and eyewear. Pt was with mask. Appropriate hand hygiene performed before and after patient contact and donning/doffing PPE.    Met with pt in ED room 11.  Introduced self and explained role. Pt agreed to interview.    Pt is a 56 Y.O S/W/M. He is currently homeless. He is on disability for Schizophrenia. He considers his payee his friend and support system. He denies any legal issues. He is a [place].     Access center consulted for ETOH use. Pt reports drinking as much as he can with as much money as he has that day. Pt is a poor historian and unable to say exactly how much he drinks or what, just that he likes it all. His last drink was yesterday before coming into the hospital for vomiting blood. Pt reports some withdrawal symptoms such as tremors and anxiety. Pt would often fall asleep during the interview but was easily aroused. Pt is currently taking Seroquel for Schizophrenia daily. His psychiatrist is Dr. Braga. He reports over 30 admissions at [facility] for ETOH withdrawal. He reports hearing voices daily but they are not command in nature. He occasionally sees bugs. He denies any problems sleeping or with his appetite. He rates his depression 9/10 and anxiety 9/10. Pt reports \"dabbling\" with heroin, cocaine and meth but quit about 2 months ago. Pt reports blacking out often and history of seizures with withdrawing from ETOH.    Resources were given. Pt stated he is interested in going back to [facility]. Access will follow briefly while inpatient at [facility].  "

## 2021-09-13 NOTE — ED PROVIDER NOTES
Pt presents to the ED complaining of vomiting blood starting tonight.  The patient tells me has had 2 to 3 days of black stools and began vomiting blood tonight with upper abdominal pain.  The patient states that he had been on Eliquis for approximately 2 years for DVT but has not taken it in over a week.  The patient was seen here not long ago with a CT scan which showed esophagitis.  Currently the patient complains of epigastric pain.    On exam, pt is A&Ox3. NAD  PERRL, moist mucous membranes.  Normocephalic and atraumatic  Heart is tachycardic to 110. Lungs are CTAB.   Abd is soft with epigastric tenderness with guarding but no rebound and diminished bowel sounds  No pedal edema.  No calf tenderness.  Nonfocal neuro exam      I agree with midlevel plan to provide IV fluids while obtaining lab work, Protonix, Zofran and an abdominal CT for further evaluation    PPE  Pt does not present with symptoms for COVID19; however, I was wearing a N95 mask and goggles throughout all patient interaction.    The patient CT scan shows marked distal esophagus thickening.  His hemoglobin is stable.  I have given him IV fluids, a Protonix bolus and drip and Ativan to prevent alcohol withdrawal.  We have discussed the case with Dr. Coker from [facility] who will admit the patient to a telemetry bed.    The K[facility] and I have discussed this patient's history, physical exam, and treatment plan.  I have reviewed the documentation and personally had a face to face interaction with the patient. I affirm the documentation and agree with the treatment and plan.  The attached note describes my personal findings.           Vishal Schilling MD  09/13/21 0916

## 2021-09-13 NOTE — ED NOTES
Pt standing up , needing to void. Provide pt with clean adult depends as no urinal available and he was urinating in his pants.  Urinal obtained and provided to pt. Pt tolerated well, place back into bed, vitals stable.     Anali Enriquez RN  09/13/21 0603

## 2021-09-13 NOTE — H&P
Patient Name:  David Villalta  YOB: 1965  MRN:  [id number]  Admit Date:  9/13/2021  Patient Care Team:  Provider, No Known as PCP - General      Subjective   History Present Illness     Chief Complaint   Patient presents with   • Vomiting Blood   • Abdominal Pain       Mr. Villalta is a 56 y.o. male with a history of schizophrenia, alcohol dependence, hepatitis C  that presents to [facility] complaining of abdominal pain with n/v. He reports coffee ground emesis and bright red blood. Symptoms have been present since last night. Abdominal pain is located in upper epigastric region and mid abd. Denies dark colored stools or diarrhea; last BM 2-3 days ago. He is prescribed Eliquis for hx of DVT but stopped taking about 1 week ago. He drinks daily; last drink was last night. He was seen in ED on 8/26/21 for abd pain w/vomiting where there was mild thickening of distal esophagus seen on CT; he was discharged with zofran and pepcid. He reports being homeless currently. He has hx of schizophrenia and has required admission to [facility] in the past. He has not been taking prescribed Seroquel due to difficulty getting meds. He has had recent paranoia and is interested in psychiatric evaluation. Denies recent fever, shortness of breath, dysuria.    Afebrile. HR controlled. BP elevated. On room air. Covid neg. WBC 18.71, Hgb 14.4. Ethanol neg. Mg 2.0. INR 1.06. lipase 27. Gluc 149, Na 129, Cl 87, CO2 29.4, AST 54; remaining chem panel unremarkable. CT A/P: interval marked progression of distal esophageal wall thickening; otherwise abd/pelvis is unremarkable.       Review of Systems   Constitutional: Negative for fever.   HENT: Negative for congestion.    Respiratory: Negative for shortness of breath.    Cardiovascular: Negative for chest pain.   Gastrointestinal: Positive for abdominal pain, nausea and vomiting. Negative for diarrhea.   Genitourinary: Negative for difficulty urinating.  "  Musculoskeletal: Negative for arthralgias and myalgias.   Skin: Negative for rash.   Neurological: Positive for tremors. Negative for headaches.   Psychiatric/Behavioral: The patient is nervous/anxious.         Personal History     Past Medical History:   Diagnosis Date   • Anxiety    • Depression    • Hepatitis C    • Hx of drug abuse (CMS/HCC)      Past Surgical History:   Procedure Laterality Date   • ANKLE SURGERY     • SPLENECTOMY       History reviewed. No pertinent family history.  Social History     Tobacco Use   • Smoking status: Current Every Day Smoker   • Smokeless tobacco: Never Used   Substance Use Topics   • Alcohol use: Yes     Comment: \"3 cases of beer a week & 5-6 bottles of wine. Sometimes some 5ths.\"   • Drug use: Yes     Comment: \"meth\"     No current facility-administered medications on file prior to encounter.     Current Outpatient Medications on File Prior to Encounter   Medication Sig Dispense Refill   • apixaban (ELIQUIS) 5 MG tablet tablet Take 5 mg by mouth 2 (Two) Times a Day.     • famotidine (PEPCID) 40 MG tablet Take 1 tablet by mouth Daily. 30 tablet 0   • multivitamin with minerals (MULTIVITAMIN ADULTS PO) Take 1 tablet by mouth Daily.     • ondansetron ODT (ZOFRAN-ODT) 4 MG disintegrating tablet Place 1 tablet on the tongue Every 6 (Six) Hours As Needed for Nausea or Vomiting. 12 tablet 0   • QUEtiapine (SEROquel) 300 MG tablet Take 300 mg by mouth Every Night.       Allergies   Allergen Reactions   • Penicillin G Unknown - Low Severity       Objective    Objective     Vital Signs  Temp:  [98.5 °F (36.9 °C)] 98.5 °F (36.9 °C)  Heart Rate:  [105-133] 105  Resp:  [16-18] 16  BP: (140-164)/() 140/92  SpO2:  [94 %-97 %] 94 %  on   ;   Device (Oxygen Therapy): room air  Body mass index is 16.5 kg/m².    Physical Exam  Vitals and nursing note reviewed.   Constitutional:       General: He is not in acute distress.  HENT:      Head: Normocephalic.      Mouth/Throat:      Mouth: " Mucous membranes are moist.   Eyes:      Conjunctiva/sclera: Conjunctivae normal.   Cardiovascular:      Rate and Rhythm: Regular rhythm. Tachycardia present.   Pulmonary:      Effort: Pulmonary effort is normal. No respiratory distress.      Breath sounds: Normal breath sounds.   Abdominal:      General: Bowel sounds are normal.      Palpations: Abdomen is soft.      Tenderness: There is abdominal tenderness.   Musculoskeletal:      Cervical back: Neck supple.      Right lower leg: No edema.      Left lower leg: No edema.   Skin:     General: Skin is warm and dry.   Neurological:      Mental Status: He is alert and oriented to person, place, and time.      Motor: Tremor present.   Psychiatric:         Mood and Affect: Mood is anxious.         Behavior: Behavior is cooperative.         Results Review:  I reviewed the patient's new clinical results.  I reviewed the patient's new imaging results and agree with the interpretation.  I reviewed the patient's other test results and agree with the interpretation  I personally viewed and interpreted the patient's EKG/Telemetry data    Lab Results (last 24 hours)     Procedure Component Value Units Date/Time    CBC & Differential [[id number]]  (Abnormal) Collected: 09/13/21 0625    Specimen: Blood Updated: 09/13/21 0642    Narrative:      The following orders were created for panel order CBC & Differential.  Procedure                               Abnormality         Status                     ---------                               -----------         ------                     CBC Auto Differential[[id number]]        Abnormal            Final result                 Please view results for these tests on the individual orders.    Comprehensive Metabolic Panel [[id number]]  (Abnormal) Collected: 09/13/21 0625    Specimen: Blood Updated: 09/13/21 0653     Glucose 149 mg/dL      BUN 10 mg/dL      Creatinine 0.85 mg/dL      Sodium 129 mmol/L      Potassium 3.7 mmol/L      Chloride  87 mmol/L      CO2 29.4 mmol/L      Calcium 9.3 mg/dL      Total Protein 7.3 g/dL      Albumin 4.50 g/dL      ALT (SGPT) 28 U/L      AST (SGOT) 54 U/L      Alkaline Phosphatase 100 U/L      Total Bilirubin 0.9 mg/dL      eGFR Non African Amer 94 mL/min/1.73      Globulin 2.8 gm/dL      A/G Ratio 1.6 g/dL      BUN/Creatinine Ratio 11.8     Anion Gap 12.6 mmol/L     Narrative:      GFR Normal >60  Chronic Kidney Disease <60  Kidney Failure <15      Lipase [[id number]]  (Normal) Collected: 09/13/21 0625    Specimen: Blood Updated: 09/13/21 0653     Lipase 27 U/L     Protime-INR [[id number]]  (Normal) Collected: 09/13/21 0625    Specimen: Blood Updated: 09/13/21 0653     Protime 13.6 Seconds      INR 1.06    aPTT [[id number]]  (Normal) Collected: 09/13/21 0625    Specimen: Blood Updated: 09/13/21 0653     PTT 27.6 seconds     Magnesium [[id number]]  (Normal) Collected: 09/13/21 0625    Specimen: Blood Updated: 09/13/21 0653     Magnesium 2.0 mg/dL     Ethanol [[id number]] Collected: 09/13/21 0625    Specimen: Blood Updated: 09/13/21 0653     Ethanol <10 mg/dL      Ethanol % <0.010 %     CBC Auto Differential [[id number]]  (Abnormal) Collected: 09/13/21 0625    Specimen: Blood Updated: 09/13/21 0642     WBC 18.71 10*3/mm3      RBC 4.73 10*6/mm3      Hemoglobin 14.4 g/dL      Hematocrit 41.1 %      MCV 86.9 fL      MCH 30.4 pg      MCHC 35.0 g/dL      RDW 14.1 %      RDW-SD 43.9 fl      MPV 9.2 fL      Platelets 365 10*3/mm3      Neutrophil % 82.7 %      Lymphocyte % 6.9 %      Monocyte % 9.7 %      Eosinophil % 0.0 %      Basophil % 0.2 %      Immature Grans % 0.5 %      Neutrophils, Absolute 15.48 10*3/mm3      Lymphocytes, Absolute 1.29 10*3/mm3      Monocytes, Absolute 1.81 10*3/mm3      Eosinophils, Absolute 0.00 10*3/mm3      Basophils, Absolute 0.04 10*3/mm3      Immature Grans, Absolute 0.09 10*3/mm3      nRBC 0.0 /100 WBC     COVID PRE-OP / PRE-PROCEDURE SCREENING ORDER (NO ISOLATION) - Swab, Nasopharynx [[id number]]   (Normal) Collected: 09/13/21 0824    Specimen: Swab from Nasopharynx Updated: 09/13/21 0925    Narrative:      The following orders were created for panel order COVID PRE-OP / PRE-PROCEDURE SCREENING ORDER (NO ISOLATION) - Swab, Nasopharynx.  Procedure                               Abnormality         Status                     ---------                               -----------         ------                     COVID-19,[facility] IN-HOUSE...[[id number]]  Normal              Final result                 Please view results for these tests on the individual orders.    COVID-19,[facility] IN-HOUSE CEPHEID/ANA MARIA NP SWAB IN TRANSPORT MEDIA 8-12 HR TAT - Swab, Nasopharynx [[id number]]  (Normal) Collected: 09/13/21 0824    Specimen: Swab from Nasopharynx Updated: 09/13/21 0925     COVID19 Not Detected    Narrative:      Fact sheet for providers: https://www.fda.gov/media/208966/download     Fact sheet for patients: https://www.fda.gov/media/786559/download          Imaging Results (Last 24 Hours)     Procedure Component Value Units Date/Time    CT Abdomen Pelvis With Contrast [[id number]] Collected: 09/13/21 0805     Updated: 09/13/21 0805    Narrative:      CT ABDOMEN AND PELVIS WITH CONTRAST     HISTORY: Upper abdominal pain with hematemesis.     TECHNIQUE: Axial CT images of the abdomen and pelvis were obtained  following administration of intravenous contrast. The patient was not  given oral contrast Coronal and sagittal reformats were obtained.     COMPARISON: 08/26/2021     FINDINGS: There is marked low-density wall thickening within the distal  esophagus that has significantly increased in the interim from prior CT,  most concerning for esophagitis. No pneumomediastinum identified within  the visualized abdomen.     Evaluation of the upper abdomen is limited secondary to motion. The  proximal small bowel loops are mildly dilated most suggestive of mild  ileus. Otherwise, there is no evidence of bowel obstruction.  The  appendix is normal. Moderate amount of formed stool is seen within the  colon. No focal hepatic lesions nor intrahepatic biliary dilatation.  There is a mildly prominent portacaval lymph node that is favored to be  reactive. The gallbladder is normal. 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 The pancreas is  normal without ductal dilatation. Bilateral adrenal glands are normal.  Both kidneys are normal in size and attenuation. No hydronephrosis. The  urinary bladder is partially distended and normal. There is no  pathological retroperitoneal lymphadenopathy. No ascites.       Impression:      There has been interval marked progression of the distal  esophageal wall thickening that is low-density and circumferential. No  pneumomediastinum within the visualized portion. Otherwise the abdomen  and pelvis is likely unremarkable.     These findings were discussed with Dr. Schilling by telephone.     Radiation dose reduction techniques were utilized, including automated  exposure control and exposure modulation based on body size.                    No orders to display        Assessment/Plan     Active Hospital Problems    Diagnosis  POA   • **Hematemesis with nausea [K92.0]  Yes   • Esophageal thickening [K22.8]  Yes   • History of DVT (deep vein thrombosis) [Z86.718]  Not Applicable   • Hyponatremia [E87.1]  Yes   • Alcohol dependence (CMS/HCC) [F10.20]  Yes   • Leukocytosis [D72.829]  Yes      Resolved Hospital Problems   No resolved problems to display.       Mr. Villalta is a 56 y.o. male with a history of schizophrenia, alcohol dependence, hepatitis C who is admitted for esophageal thickening with n/v/hematemsis    -GI consult. NPO. IVF's. PPI gtt. Monitor Hgb, transfuse if needed. Antiemetics. Has been off Eliquis for ~ 1 week. No definite hx of esophageal varices  -Na 129. Likely due to GI loss/alcohol use. Monitor  -Access to follow. [facility] protocol  -WBC 18.71.  Afebrile. May be reactive. Will trend. Denies resp/ symptoms    Further recommendations based on hospital course    Home meds pending reconciliation    · I discussed the patient's findings and my recommendations with patient and Dr. Coker.    VTE Prophylaxis - SCDs.  Code Status - Full code.       KAREL Glass  [facility]  09/13/21  12:29 EDT

## 2021-09-13 NOTE — CONSULTS
"[facility]  Initial Inpatient Consult Note    Referring Provider: A    Reason for Consultation: GI bleed    Subjective     History of present illness:      Thank you for requesting my opinion.    This is a 56-year-old homeless and alcoholic man who came to the emergency room with reports of hematemesis.  He reports this happened yesterday.  He says he vomited about \"15 times\".  He says it was all bright red blood.  He denies any associated melena, in fact he says he has not had a bowel movement.  CT imaging demonstrates thickening of the esophagus.  He denies that he is having significant esophageal pain.  He does not think he has ever had an EGD.  He drinks excessively and has done so for a number of years.  He says this includes both beer and liquor.  He cannot really quantify how much he drinks.    He is homeless.  He says he is planning to go [facility] upon discharge.  He says he has had alcohol for a number of years.  He cannot really tell me if he has any family history of GI malignancies.        Past Medical History:  Past Medical History:   Diagnosis Date   • Anxiety    • Depression    • Hepatitis C    • Hx of drug abuse (CMS/HCC)        Past Surgical History:  Past Surgical History:   Procedure Laterality Date   • ANKLE SURGERY     • SPLENECTOMY          Social History:   Social History     Tobacco Use   • Smoking status: Current Every Day Smoker   • Smokeless tobacco: Never Used   Substance Use Topics   • Alcohol use: Yes     Comment: \"3 cases of beer a week & 5-6 bottles of wine. Sometimes some 5ths.\"        Family History:  History reviewed. No pertinent family history.    Home Meds:  (Not in a hospital admission)      Current Meds:   [START ON 9/14/2021] thiamine, 100 mg, Oral, Daily   And  [START ON 9/14/2021] multivitamin, 1 tablet, Oral, Daily   And  [START ON 9/14/2021] folic acid, 1 mg, Oral, Daily        Allergies:  Allergies   Allergen Reactions   • " Penicillin G Unknown - Low Severity       Review of Systems  All systems were reviewed and negative except for:  Gastrointestinal: positive for  hematemesis     Objective     Vital Signs  Temp:  [98.5 °F (36.9 °C)] 98.5 °F (36.9 °C)  Heart Rate:  [105-133] 105  Resp:  [16-18] 16  BP: (140-164)/() 140/92    Physical Exam:  Constitutional:   Drowsy, cooperative, in no acute distress, appears stated age, disheveled   Eyes:           Lids and lashes normal, conjunctivae and sclerae normal,   no icterus   Ears, nose, mouth and throat:  Normal appearance of external ears and nose, no oral l  lesions, no thrush, oral mucosa moist   Respiratory:    Clear to auscultation, respirations regular, even and             unlabored    Cardiovascular:   Regular rhythm and normal rate, normal S1 and S2, no        murmur, no gallop, palpable distal pulses, no lower extremity edema   Gastrointestinal:    Soft, nondistended, nontender to palpation, no guarding, no rebound tenderness, normal bowel sounds, no palpable masses or organomegaly  Rectal exam: deferred   Musculoskeletal:  Normal station, no atrophy, no tenderness to palpation, normal digits and nails   Skin:  Normal color, no bleeding, bruising, rashes or lesions   Lymphatics:  No palpable cervical or supraclavicular adenopathy   Psychiatric:  Judgement and insight: limited insight, poor historian   Orientation to person, place and time: normal   Mood and affect: normal       Results Review:   I reviewed the patient's new clinical results.    Results from last 7 days   Lab Units 09/13/21  0625   WBC 10*3/mm3 18.71*   HEMOGLOBIN g/dL 14.4   HEMATOCRIT % 41.1   PLATELETS 10*3/mm3 365       Results from last 7 days   Lab Units 09/13/21  0625   SODIUM mmol/L 129*   POTASSIUM mmol/L 3.7   CHLORIDE mmol/L 87*   CO2 mmol/L 29.4*   BUN mg/dL 10   CREATININE mg/dL 0.85   CALCIUM mg/dL 9.3   BILIRUBIN mg/dL 0.9   ALK PHOS U/L 100   ALT (SGPT) U/L 28   AST (SGOT) U/L 54*   GLUCOSE  mg/dL 149*       Results from last 7 days   Lab Units 09/13/21  0625   INR  1.06       Lab Results   Lab Value Date/Time    LIPASE 27 09/13/2021 0625    LIPASE 94 (H) 08/26/2021 0428       Radiology:  Imaging Results (Last 72 Hours)     Procedure Component Value Units Date/Time    CT Abdomen Pelvis With Contrast [[id number]] Collected: 09/13/21 0805     Updated: 09/13/21 0805    Narrative:      CT ABDOMEN AND PELVIS WITH CONTRAST     HISTORY: Upper abdominal pain with hematemesis.     TECHNIQUE: Axial CT images of the abdomen and pelvis were obtained  following administration of intravenous contrast. The patient was not  given oral contrast Coronal and sagittal reformats were obtained.     COMPARISON: 08/26/2021     FINDINGS: There is marked low-density wall thickening within the distal  esophagus that has significantly increased in the interim from prior CT,  most concerning for esophagitis. No pneumomediastinum identified within  the visualized abdomen.     Evaluation of the upper abdomen is limited secondary to motion. The  proximal small bowel loops are mildly dilated most suggestive of mild  ileus. Otherwise, there is no evidence of bowel obstruction. The  appendix is normal. Moderate amount of formed stool is seen within the  colon. No focal hepatic lesions nor intrahepatic biliary dilatation.  There is a mildly prominent portacaval lymph node that is favored to be  reactive. The gallbladder is normal. 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 The pancreas is  normal without ductal dilatation. Bilateral adrenal glands are normal.  Both kidneys are normal in size and attenuation. No hydronephrosis. The  urinary bladder is partially distended and normal. There is no  pathological retroperitoneal lymphadenopathy. No ascites.       Impression:      There has been interval marked progression of the distal  esophageal wall thickening that is low-density and  circumferential. No  pneumomediastinum within the visualized portion. Otherwise the abdomen  and pelvis is likely unremarkable.     These findings were discussed with Dr. Schilling by telephone.     Radiation dose reduction techniques were utilized, including automated  exposure control and exposure modulation based on body size.                Assessment/Plan       Hematemesis with nausea    Esophageal thickening    History of DVT (deep vein thrombosis)    Hyponatremia    Alcohol dependence (CMS/HCC)    Leukocytosis      Impression  1.  Reports of hematemesis: Not witnessed here.  Hemoglobin is stable    2.  Thickening of the esophagus: On CT imaging, likely esophagitis    3.  Hyponatremia: Suspected multifactorial with alcohol use    4.  Elevated AST level: With alcohol use    5.  Alcohol abuse: Ongoing issue    6. Leukocytosis    Plan  Okay for clear liquids  PPI drip  Monitor for further bleeding  Follow hemoglobin, maintain above 7  We will plan for EGD tomorrow  Needs alcohol cessation    I discussed the patients findings and my recommendations with patient and Dr Coker    All necessary PPE, including face mask and eye protection, were worn during this encounter.  Hand sanitization was performed both before and after the patient interaction.    Allyson Magaña MD  [facility]      Dictated utilizing Dragon dictation

## 2021-09-13 NOTE — ED PROVIDER NOTES
EMERGENCY DEPARTMENT ENCOUNTER    Room Number:  11/11  Date of encounter:  9/13/2021  PCP: Provider, No Known  Historian: Patient      I used full protective equipment while examining this patient.  This includes face mask, gloves and protective eyewear.  I washed my hands before entering the room and immediately upon leaving the room      HPI:  Chief Complaint: Vomiting  A complete HPI/ROS/PMH/PSH/SH/FH are unobtainable due to: Patient is a very poor historian, intoxication    Context: David Villalta is a 56 y.o. male who presents to the ED c/o vomiting starting tonight.  Patient has noticed coffee-ground looking emesis, as well as bright red bleeding in his vomitus tonight.  Patient is a poor historian.  He describes diffuse abdominal pain.  He denies any diarrhea or dark stools.  Patient does have a history of DVT and is prescribed Eliquis, however he has not had a dose in greater than 1 week.  He is a daily drinker and states he was drinking just prior to arrival.  Patient is homeless.  Patient believes he has a history of esophageal varices however he is unsure.    Review of Medical Records  I reviewed patient's last admission from 8/26/2021.  Patient admitted for esophagitis.    PAST MEDICAL HISTORY  Active Ambulatory Problems     Diagnosis Date Noted   • No Active Ambulatory Problems     Resolved Ambulatory Problems     Diagnosis Date Noted   • No Resolved Ambulatory Problems     Past Medical History:   Diagnosis Date   • Anxiety    • Depression    • Hepatitis C    • Hx of drug abuse (CMS/HCC)          PAST SURGICAL HISTORY  Past Surgical History:   Procedure Laterality Date   • ANKLE SURGERY     • SPLENECTOMY           FAMILY HISTORY  History reviewed. No pertinent family history.      SOCIAL HISTORY  Social History     Socioeconomic History   • Marital status: Single     Spouse name: Not on file   • Number of children: Not on file   • Years of education: Not on file   • Highest education level: Not on file  "  Tobacco Use   • Smoking status: Current Every Day Smoker   • Smokeless tobacco: Never Used   Substance and Sexual Activity   • Alcohol use: Yes     Comment: \"3 cases of beer a week & 5-6 bottles of wine. Sometimes some 5ths.\"   • Drug use: Yes     Comment: \"meth\"   • Sexual activity: Defer         ALLERGIES  Penicillin g        REVIEW OF SYSTEMS  All systems reviewed and negative except for those discussed in HPI.       PHYSICAL EXAM    I have reviewed the triage vital signs and nursing notes.    ED Triage Vitals [09/13/21 0541]   Temp Heart Rate Resp BP SpO2   98.5 °F (36.9 °C) (!) 133 18 (!) 158/102 97 %      Temp src Heart Rate Source Patient Position BP Location FiO2 (%)   -- Monitor -- -- --       Physical Exam  GENERAL: Alert, oriented, disheveled, not distressed  HENT: head atraumatic, no nuchal rigidity  EYES: no scleral icterus, EOMI  CV: regular rhythm, tachycardic rate, no murmur  RESPIRATORY: normal effort, CTA  ABDOMEN: soft, nontender  MUSCULOSKELETAL: no deformity, FROM, no calf swelling or tenderness  NEURO: alert, moves all extremities, follows commands  SKIN: warm, dry        LAB RESULTS  Recent Results (from the past 24 hour(s))   Comprehensive Metabolic Panel    Collection Time: 09/13/21  6:25 AM    Specimen: Blood   Result Value Ref Range    Glucose 149 (H) 65 - 99 mg/dL    BUN 10 6 - 20 mg/dL    Creatinine 0.85 0.76 - 1.27 mg/dL    Sodium 129 (L) 136 - 145 mmol/L    Potassium 3.7 3.5 - 5.2 mmol/L    Chloride 87 (L) 98 - 107 mmol/L    CO2 29.4 (H) 22.0 - 29.0 mmol/L    Calcium 9.3 8.6 - 10.5 mg/dL    Total Protein 7.3 6.0 - 8.5 g/dL    Albumin 4.50 3.50 - 5.20 g/dL    ALT (SGPT) 28 1 - 41 U/L    AST (SGOT) 54 (H) 1 - 40 U/L    Alkaline Phosphatase 100 39 - 117 U/L    Total Bilirubin 0.9 0.0 - 1.2 mg/dL    eGFR Non African Amer 94 >60 mL/min/1.73    Globulin 2.8 gm/dL    A/G Ratio 1.6 g/dL    BUN/Creatinine Ratio 11.8 7.0 - 25.0    Anion Gap 12.6 5.0 - 15.0 mmol/L   Lipase    Collection Time: " 09/13/21  6:25 AM    Specimen: Blood   Result Value Ref Range    Lipase 27 13 - 60 U/L   Protime-INR    Collection Time: 09/13/21  6:25 AM    Specimen: Blood   Result Value Ref Range    Protime 13.6 11.7 - 14.2 Seconds    INR 1.06 0.90 - 1.10   aPTT    Collection Time: 09/13/21  6:25 AM    Specimen: Blood   Result Value Ref Range    PTT 27.6 22.7 - 35.4 seconds   Type & Screen    Collection Time: 09/13/21  6:25 AM    Specimen: Blood   Result Value Ref Range    ABO Type A     RH type Positive     Antibody Screen Negative     T&S Expiration Date 9/16/2021 11:59:59 PM    Magnesium    Collection Time: 09/13/21  6:25 AM    Specimen: Blood   Result Value Ref Range    Magnesium 2.0 1.6 - 2.6 mg/dL   Ethanol    Collection Time: 09/13/21  6:25 AM    Specimen: Blood   Result Value Ref Range    Ethanol <10 0 - 10 mg/dL    Ethanol % <0.010 %   CBC Auto Differential    Collection Time: 09/13/21  6:25 AM    Specimen: Blood   Result Value Ref Range    WBC 18.71 (H) 3.40 - 10.80 10*3/mm3    RBC 4.73 4.14 - 5.80 10*6/mm3    Hemoglobin 14.4 13.0 - 17.7 g/dL    Hematocrit 41.1 37.5 - 51.0 %    MCV 86.9 79.0 - 97.0 fL    MCH 30.4 26.6 - 33.0 pg    MCHC 35.0 31.5 - 35.7 g/dL    RDW 14.1 12.3 - 15.4 %    RDW-SD 43.9 37.0 - 54.0 fl    MPV 9.2 6.0 - 12.0 fL    Platelets 365 140 - 450 10*3/mm3    Neutrophil % 82.7 (H) 42.7 - 76.0 %    Lymphocyte % 6.9 (L) 19.6 - 45.3 %    Monocyte % 9.7 5.0 - 12.0 %    Eosinophil % 0.0 (L) 0.3 - 6.2 %    Basophil % 0.2 0.0 - 1.5 %    Immature Grans % 0.5 0.0 - 0.5 %    Neutrophils, Absolute 15.48 (H) 1.70 - 7.00 10*3/mm3    Lymphocytes, Absolute 1.29 0.70 - 3.10 10*3/mm3    Monocytes, Absolute 1.81 (H) 0.10 - 0.90 10*3/mm3    Eosinophils, Absolute 0.00 0.00 - 0.40 10*3/mm3    Basophils, Absolute 0.04 0.00 - 0.20 10*3/mm3    Immature Grans, Absolute 0.09 (H) 0.00 - 0.05 10*3/mm3    nRBC 0.0 0.0 - 0.2 /100 WBC       Ordered the above labs and independently reviewed the results.        RADIOLOGY  CT Abdomen  Pelvis With Contrast    Result Date: 9/13/2021  CT ABDOMEN AND PELVIS WITH CONTRAST  HISTORY: Upper abdominal pain with hematemesis.  TECHNIQUE: Axial CT images of the abdomen and pelvis were obtained following administration of intravenous contrast. The patient was not given oral contrast Coronal and sagittal reformats were obtained.  COMPARISON: 08/26/2021  FINDINGS: There is marked low-density wall thickening within the distal esophagus that has significantly increased in the interim from prior CT, most concerning for esophagitis. No pneumomediastinum identified within the visualized abdomen.  Evaluation of the upper abdomen is limited secondary to motion. The proximal small bowel loops are mildly dilated most suggestive of mild ileus. Otherwise, there is no evidence of bowel obstruction. The appendix is normal. Moderate amount of formed stool is seen within the colon. No focal hepatic lesions nor intrahepatic biliary dilatation. There is a mildly prominent portacaval lymph node that is favored to be reactive. The gallbladder is normal. There appears to have been a prior splenectomy with small accessory splenules present. The pancreas is normal without ductal dilatation. Bilateral adrenal glands are normal. Both kidneys are normal in size and attenuation. No hydronephrosis. The urinary bladder is partially distended and normal. There is no pathological retroperitoneal lymphadenopathy. No ascites.      There has been interval marked progression of the distal esophageal wall thickening that is low-density and circumferential. No pneumomediastinum within the visualized portion. Otherwise the abdomen and pelvis is likely unremarkable.  These findings were discussed with Dr. Schilling by telephone.  Radiation dose reduction techniques were utilized, including automated exposure control and exposure modulation based on body size.         I ordered the above noted radiological studies. Reviewed by me and discussed with  radiologist.  See dictation for official radiology interpretation.      MEDICATIONS GIVEN IN ER    Medications   sodium chloride 0.9 % flush 10 mL (has no administration in time range)   pantoprazole (PROTONIX) injection 80 mg (80 mg Intravenous Given 9/13/21 0803)     And   pantoprazole (PROTONIX) 40 mg in 100 mL NS (VTB) (8 mg/hr Intravenous New Bag 9/13/21 0804)   sodium chloride 0.9 % infusion (has no administration in time range)   sodium chloride 0.9 % bolus 1,000 mL (1,000 mL Intravenous New Bag 9/13/21 0655)   ondansetron (ZOFRAN) injection 4 mg (4 mg Intravenous Given 9/13/21 0656)   iopamidol (ISOVUE-300) 61 % injection 100 mL (85 mL Intravenous Given 9/13/21 0741)   LORazepam (ATIVAN) injection 1 mg (1 mg Intravenous Given 9/13/21 0826)         PROGRESS, DATA ANALYSIS, CONSULTS, AND MEDICAL DECISION MAKING    All labs have been independently reviewed by me.  All radiology studies have been reviewed by me and discussed with radiologist dictating the report.   EKG's independently viewed and interpreted by me.  Discussion below represents my analysis of pertinent findings related to patient's condition, differential diagnosis, treatment plan and final disposition.    I have discussed case with Dr. Schilling, emergency room physician.  He has performed his own bedside examination and agrees with treatment plan.    ED Course as of Sep 13 0839   Mon Sep 13, 2021   0612 Patient with coffee-ground emesis today.  Patient has been off his Eliquis for greater than 1 week.  Patient is an alcoholic.  Differential diagnoses include but not limited to esophageal bleed, gastritis, upper GI bleed.    [EE]   0655 Hemoglobin: 14.4 [EE]   0655 WBC(!): 18.71 [EE]   0655 Sodium(!): 129 [EE]   0655 Magnesium: 2.0 [EE]   0655 Ethanol: <10 [EE]   0701 The patient's white count is elevated 18 however his hemoglobin is stable at 14.  I will start a Protonix bolus and drip on the patient and order abdominal CT scan for further  evaluation.    [GP]   3608 I spoke with the radiologist-Dr. Ribera about the abdominal CT scan which shows a markedly thickened distal esophagus that is markedly progressed since his last CT on August 26.  I believe the patient will need to be admitted to the hospital for further evaluation and care and likely EGD.    [GP]   4016 Upon repeat evaluation the patient is resting comfortably and has had no further hematemesis.  His heart rate is 109.  His systolic blood pressure is 159.  I advised him of his elevated white count, esophageal thickening and need for admission for further evaluation and care.  I will give him a dose of Ativan because he states that he does have a history of drinking excessively and has had alcohol withdrawal before.    [GP]   4960 I discussed the case with Dr. Coker from [facility].  We will admit the patient to a telemetry bed as an inpatient for further evaluation and care.    [GP]      ED Course User Index  [EE] Olivier Plata PA  [GP] Vishal Schilling MD       AS OF 08:39 EDT VITALS:    BP - 150/97  HR - 119  TEMP - 98.5 °F (36.9 °C)  O2 SATS - 97%        DIAGNOSIS  Final diagnoses:   Hematemesis with nausea   Esophageal thickening   History of alcohol abuse         DISPOSITION  Admitted           Olivier Plata PA  09/13/21 1851

## 2021-09-13 NOTE — ED NOTES
Pt had gotten up and pulled out his Iv. Started new Iv to left hands restarted all meds     Anali Enriquez RN  09/13/21 8526

## 2021-09-14 ENCOUNTER — ANESTHESIA (OUTPATIENT)
Dept: GASTROENTEROLOGY | Facility: HOSPITAL | Age: 56
End: 2021-09-14

## 2021-09-14 ENCOUNTER — ANESTHESIA EVENT (OUTPATIENT)
Dept: GASTROENTEROLOGY | Facility: HOSPITAL | Age: 56
End: 2021-09-14

## 2021-09-14 LAB
ALBUMIN SERPL-MCNC: 3.5 G/DL (ref 3.5–5.2)
ALBUMIN/GLOB SERPL: 1.4 G/DL
ALP SERPL-CCNC: 79 U/L (ref 39–117)
ALT SERPL W P-5'-P-CCNC: 21 U/L (ref 1–41)
ANION GAP SERPL CALCULATED.3IONS-SCNC: 8.6 MMOL/L (ref 5–15)
AST SERPL-CCNC: 42 U/L (ref 1–40)
BILIRUB SERPL-MCNC: 0.8 MG/DL (ref 0–1.2)
BUN SERPL-MCNC: 6 MG/DL (ref 6–20)
BUN/CREAT SERPL: 7.3 (ref 7–25)
CALCIUM SPEC-SCNC: 8.3 MG/DL (ref 8.6–10.5)
CHLORIDE SERPL-SCNC: 102 MMOL/L (ref 98–107)
CO2 SERPL-SCNC: 26.4 MMOL/L (ref 22–29)
CREAT SERPL-MCNC: 0.82 MG/DL (ref 0.76–1.27)
DEPRECATED RDW RBC AUTO: 45.4 FL (ref 37–54)
ERYTHROCYTE [DISTWIDTH] IN BLOOD BY AUTOMATED COUNT: 14.1 % (ref 12.3–15.4)
GFR SERPL CREATININE-BSD FRML MDRD: 98 ML/MIN/1.73
GLOBULIN UR ELPH-MCNC: 2.5 GM/DL
GLUCOSE SERPL-MCNC: 94 MG/DL (ref 65–99)
HCT VFR BLD AUTO: 34.9 % (ref 37.5–51)
HCT VFR BLD AUTO: 34.9 % (ref 37.5–51)
HCT VFR BLD AUTO: 35.7 % (ref 37.5–51)
HGB BLD-MCNC: 11.7 G/DL (ref 13–17.7)
HGB BLD-MCNC: 11.7 G/DL (ref 13–17.7)
HGB BLD-MCNC: 11.9 G/DL (ref 13–17.7)
MCH RBC QN AUTO: 30.2 PG (ref 26.6–33)
MCHC RBC AUTO-ENTMCNC: 33.5 G/DL (ref 31.5–35.7)
MCV RBC AUTO: 90.2 FL (ref 79–97)
PLATELET # BLD AUTO: 298 10*3/MM3 (ref 140–450)
PMV BLD AUTO: 9.5 FL (ref 6–12)
POTASSIUM SERPL-SCNC: 3.8 MMOL/L (ref 3.5–5.2)
PROT SERPL-MCNC: 6 G/DL (ref 6–8.5)
RBC # BLD AUTO: 3.87 10*6/MM3 (ref 4.14–5.8)
SODIUM SERPL-SCNC: 137 MMOL/L (ref 136–145)
WBC # BLD AUTO: 17.87 10*3/MM3 (ref 3.4–10.8)

## 2021-09-14 PROCEDURE — 43239 EGD BIOPSY SINGLE/MULTIPLE: CPT | Performed by: INTERNAL MEDICINE

## 2021-09-14 PROCEDURE — 97162 PT EVAL MOD COMPLEX 30 MIN: CPT

## 2021-09-14 PROCEDURE — 36415 COLL VENOUS BLD VENIPUNCTURE: CPT | Performed by: NURSE PRACTITIONER

## 2021-09-14 PROCEDURE — 25010000002 LORAZEPAM PER 2 MG: Performed by: STUDENT IN AN ORGANIZED HEALTH CARE EDUCATION/TRAINING PROGRAM

## 2021-09-14 PROCEDURE — 97530 THERAPEUTIC ACTIVITIES: CPT

## 2021-09-14 PROCEDURE — 25010000002 ONDANSETRON PER 1 MG: Performed by: NURSE PRACTITIONER

## 2021-09-14 PROCEDURE — 0DB98ZX EXCISION OF DUODENUM, VIA NATURAL OR ARTIFICIAL OPENING ENDOSCOPIC, DIAGNOSTIC: ICD-10-PCS | Performed by: INTERNAL MEDICINE

## 2021-09-14 PROCEDURE — 88305 TISSUE EXAM BY PATHOLOGIST: CPT | Performed by: INTERNAL MEDICINE

## 2021-09-14 PROCEDURE — 25010000002 PROPOFOL 10 MG/ML EMULSION: Performed by: ANESTHESIOLOGY

## 2021-09-14 PROCEDURE — 85014 HEMATOCRIT: CPT | Performed by: NURSE PRACTITIONER

## 2021-09-14 PROCEDURE — 85018 HEMOGLOBIN: CPT | Performed by: NURSE PRACTITIONER

## 2021-09-14 RX ORDER — PANTOPRAZOLE SODIUM 40 MG/1
40 TABLET, DELAYED RELEASE ORAL
Status: DISCONTINUED | OUTPATIENT
Start: 2021-09-14 | End: 2021-09-15 | Stop reason: HOSPADM

## 2021-09-14 RX ORDER — LIDOCAINE HYDROCHLORIDE 20 MG/ML
INJECTION, SOLUTION INFILTRATION; PERINEURAL AS NEEDED
Status: DISCONTINUED | OUTPATIENT
Start: 2021-09-14 | End: 2021-09-14 | Stop reason: SURG

## 2021-09-14 RX ORDER — PROPOFOL 10 MG/ML
VIAL (ML) INTRAVENOUS AS NEEDED
Status: DISCONTINUED | OUTPATIENT
Start: 2021-09-14 | End: 2021-09-14 | Stop reason: SURG

## 2021-09-14 RX ORDER — SODIUM CHLORIDE 0.9 % (FLUSH) 0.9 %
10 SYRINGE (ML) INJECTION AS NEEDED
Status: DISCONTINUED | OUTPATIENT
Start: 2021-09-14 | End: 2021-09-14

## 2021-09-14 RX ORDER — SODIUM CHLORIDE, SODIUM LACTATE, POTASSIUM CHLORIDE, CALCIUM CHLORIDE 600; 310; 30; 20 MG/100ML; MG/100ML; MG/100ML; MG/100ML
INJECTION, SOLUTION INTRAVENOUS CONTINUOUS PRN
Status: DISCONTINUED | OUTPATIENT
Start: 2021-09-14 | End: 2021-09-14 | Stop reason: SURG

## 2021-09-14 RX ORDER — SUCRALFATE 1 G/1
1 TABLET ORAL
Status: DISCONTINUED | OUTPATIENT
Start: 2021-09-14 | End: 2021-09-15 | Stop reason: HOSPADM

## 2021-09-14 RX ORDER — SODIUM CHLORIDE 9 MG/ML
30 INJECTION, SOLUTION INTRAVENOUS CONTINUOUS PRN
Status: DISCONTINUED | OUTPATIENT
Start: 2021-09-14 | End: 2021-09-15 | Stop reason: HOSPADM

## 2021-09-14 RX ADMIN — PROPOFOL 100 MCG/KG/MIN: 10 INJECTION, EMULSION INTRAVENOUS at 11:26

## 2021-09-14 RX ADMIN — SODIUM CHLORIDE 125 ML/HR: 9 INJECTION, SOLUTION INTRAVENOUS at 05:10

## 2021-09-14 RX ADMIN — SODIUM CHLORIDE, PRESERVATIVE FREE 10 ML: 5 INJECTION INTRAVENOUS at 13:27

## 2021-09-14 RX ADMIN — SODIUM CHLORIDE 8 MG/HR: 900 INJECTION, SOLUTION INTRAVENOUS at 06:24

## 2021-09-14 RX ADMIN — SODIUM CHLORIDE 30 ML/HR: 9 INJECTION, SOLUTION INTRAVENOUS at 11:05

## 2021-09-14 RX ADMIN — PANTOPRAZOLE SODIUM 40 MG: 40 TABLET, DELAYED RELEASE ORAL at 20:17

## 2021-09-14 RX ADMIN — LORAZEPAM 2 MG: 2 INJECTION INTRAMUSCULAR; INTRAVENOUS at 15:14

## 2021-09-14 RX ADMIN — ONDANSETRON 4 MG: 2 INJECTION INTRAMUSCULAR; INTRAVENOUS at 20:12

## 2021-09-14 RX ADMIN — SUCRALFATE 1 G: 1 TABLET ORAL at 20:17

## 2021-09-14 RX ADMIN — QUETIAPINE FUMARATE 300 MG: 100 TABLET ORAL at 20:11

## 2021-09-14 RX ADMIN — LIDOCAINE HYDROCHLORIDE 40 MG: 20 INJECTION, SOLUTION INFILTRATION; PERINEURAL at 11:23

## 2021-09-14 RX ADMIN — SODIUM CHLORIDE 8 MG/HR: 900 INJECTION, SOLUTION INTRAVENOUS at 01:04

## 2021-09-14 RX ADMIN — Medication 100 MG: at 15:14

## 2021-09-14 RX ADMIN — LORAZEPAM 1 MG: 1 TABLET ORAL at 20:11

## 2021-09-14 RX ADMIN — SODIUM CHLORIDE 125 ML/HR: 9 INJECTION, SOLUTION INTRAVENOUS at 23:59

## 2021-09-14 RX ADMIN — MULTIPLE VITAMINS W/ MINERALS TAB 1 TABLET: TAB at 15:14

## 2021-09-14 RX ADMIN — PROPOFOL 140 MG: 10 INJECTION, EMULSION INTRAVENOUS at 11:23

## 2021-09-14 RX ADMIN — SODIUM CHLORIDE 125 ML/HR: 9 INJECTION, SOLUTION INTRAVENOUS at 13:27

## 2021-09-14 RX ADMIN — LORAZEPAM 2 MG: 2 INJECTION INTRAMUSCULAR; INTRAVENOUS at 04:20

## 2021-09-14 RX ADMIN — SODIUM CHLORIDE, POTASSIUM CHLORIDE, SODIUM LACTATE AND CALCIUM CHLORIDE: 600; 310; 30; 20 INJECTION, SOLUTION INTRAVENOUS at 11:23

## 2021-09-14 RX ADMIN — FOLIC ACID 1 MG: 1 TABLET ORAL at 15:14

## 2021-09-14 NOTE — NURSING NOTE
Patient's legal guardian is Olinda Otoole. He called and patient gave verbal consent to speak with his legal guardian regarding plan of care.     Patient and patient's legal guardian updated on plan of care.

## 2021-09-14 NOTE — NURSING NOTE
Pt is now awake and alert. Educated pt about EGD procedure, and pt agreeable to care. Signed consent and transported to [facility].

## 2021-09-14 NOTE — PLAN OF CARE
Goal Outcome Evaluation: patient receiving ativan per [facility] protocol. Patient continuous to have paranoia, and auditory hallucinations. VSS, on room air. Pt receiving IV Protonix and Iv fluids continuously. Plan for EGD today.

## 2021-09-14 NOTE — PAYOR COMM NOTE
"Lily Paz (56 y.o. Male)     ATTN: INITIAL CLINICALS TO REVIEW FOR INPATIENT ADMISSION: [id number]    PLEASE REPLY TO UR DEPT: [phone],  [phone]        Date of Birth Social Security Number Address Home Phone MRN    1965  [address]  [facility] [phone] [id number]    Jew Marital Status          None Single       Admission Date Admission Type Admitting Provider Attending Provider Department, Room/Bed    9/13/21 Emergency Dk Coker MD McCracken, Robert Russell, MD [facility] ENDO SUITES, ENDO/ENDO    Discharge Date Discharge Disposition Discharge Destination                       Attending Provider: Narciso Knowles MD    Allergies: Penicillin G    Isolation: None   Infection: None   Code Status: CPR    Ht: 177.8 cm (70\")   Wt: 83.9 kg (184 lb 14.4 oz)    Admission Cmt: None   Principal Problem: Hematemesis with nausea [K92.0]                 Active Insurance as of 9/13/2021     Primary Coverage     Payor Plan Insurance Group Employer/Plan Group    ANTHEM MEDICAID ANTHEM MEDICAID [id number]     Payor Plan Address Payor Plan Phone Number Payor Plan Fax Number Effective Dates    [address] [address] [phone]  9/1/2020 - None Entered    [facility] [id number]       Subscriber Name Subscriber Birth Date Member ID       LILY PAZ 1965 [id number]                 Emergency Contacts      (Rel.) Home Phone Work Phone Mobile Phone    Xiang Prakash (Guardian) -- -- [phone]               History & Physical      Shyla Cordova APRN at 09/13/21 1214     Attestation signed by Dk Coker MD at 09/13/21 1633      Addendum: I have reviewed the history and plan as obtained by KAREL Lorenzana and preformed my own independent history and adjusted documentation as needed. I have personally examined the patient. My exam confirms her physical findings and I agree with the plan as listed above, with the addition " to the followin year old man with history of alcohol dependence, hepatitis c, schizophrenia, homelessness. He presents after a witnessed episode of hematemesis (by EMS). He also endorsed epigastric abdominal pain, nausea. Denied melena. He had recently been seen in the ED on  for abdominal pain with vomiting and a CT scan at the time showed mild thickening of his distal esophagus. This time, CT shows a markedly thickened distal esophageal wall. He has not had any hematemesis since presentation. His hgb was 14.4 in the ED. His ETOH level is undetectable (says his last drink was yesterday). He falls asleep multiple times while talking with me.     General: sleeping, easily woken, answers one question and falls back asleep again  HEENT: eomi, ncat  Heart: rrr, no m/g/r  Lungs: ctab, nonlabored  Abd; soft, nt/nd  Ext: no peripheral edema    Hematemesis with distal esophageal thickening-monitor hemoglobin. Ppi drip. GI consult. Discussed with Dr. Magaña: plan for EGD if not withdrawaling.    Alcohol dependence with concern for withdrawal-CIWA, thiamine, MVI, folate, access consult appreciated    Hyponatremia-monitor response to fluids.     Leukocytosis-likely reactive. No fevers, tachycardia. Monitor off antibiotics.    Schizophrenia-home seroquel    Homelessness-CCP consult    Dk Coker MD  16:23 EDT                       Patient Name:  David Villalta  YOB: 1965  MRN:  [id number]  Admit Date:  2021  Patient Care Team:  Provider, No Known as PCP - General      Subjective   History Present Illness     Chief Complaint   Patient presents with   • Vomiting Blood   • Abdominal Pain       Mr. Villalta is a 56 y.o. male with a history of schizophrenia, alcohol dependence, hepatitis C  that presents to [facility] complaining of abdominal pain with n/v. He reports coffee ground emesis and bright red blood. Symptoms have been present since last night. Abdominal pain is located in  upper epigastric region and mid abd. Denies dark colored stools or diarrhea; last BM 2-3 days ago. He is prescribed Eliquis for hx of DVT but stopped taking about 1 week ago. He drinks daily; last drink was last night. He was seen in ED on 8/26/21 for abd pain w/vomiting where there was mild thickening of distal esophagus seen on CT; he was discharged with zofran and pepcid. He reports being homeless currently. He has hx of schizophrenia and has required admission to [facility] in the past. He has not been taking prescribed Seroquel due to difficulty getting meds. He has had recent paranoia and is interested in psychiatric evaluation. Denies recent fever, shortness of breath, dysuria.    Afebrile. HR controlled. BP elevated. On room air. Covid neg. WBC 18.71, Hgb 14.4. Ethanol neg. Mg 2.0. INR 1.06. lipase 27. Gluc 149, Na 129, Cl 87, CO2 29.4, AST 54; remaining chem panel unremarkable. CT A/P: interval marked progression of distal esophageal wall thickening; otherwise abd/pelvis is unremarkable.       Review of Systems   Constitutional: Negative for fever.   HENT: Negative for congestion.    Respiratory: Negative for shortness of breath.    Cardiovascular: Negative for chest pain.   Gastrointestinal: Positive for abdominal pain, nausea and vomiting. Negative for diarrhea.   Genitourinary: Negative for difficulty urinating.   Musculoskeletal: Negative for arthralgias and myalgias.   Skin: Negative for rash.   Neurological: Positive for tremors. Negative for headaches.   Psychiatric/Behavioral: The patient is nervous/anxious.         Personal History     Past Medical History:   Diagnosis Date   • Anxiety    • Depression    • Hepatitis C    • Hx of drug abuse (CMS/HCC)      Past Surgical History:   Procedure Laterality Date   • ANKLE SURGERY     • SPLENECTOMY       History reviewed. No pertinent family history.  Social History     Tobacco Use   • Smoking status: Current Every Day Smoker   • Smokeless tobacco: Never Used  "  Substance Use Topics   • Alcohol use: Yes     Comment: \"3 cases of beer a week & 5-6 bottles of wine. Sometimes some 5ths.\"   • Drug use: Yes     Comment: \"meth\"     No current facility-administered medications on file prior to encounter.     Current Outpatient Medications on File Prior to Encounter   Medication Sig Dispense Refill   • apixaban (ELIQUIS) 5 MG tablet tablet Take 5 mg by mouth 2 (Two) Times a Day.     • famotidine (PEPCID) 40 MG tablet Take 1 tablet by mouth Daily. 30 tablet 0   • multivitamin with minerals (MULTIVITAMIN ADULTS PO) Take 1 tablet by mouth Daily.     • ondansetron ODT (ZOFRAN-ODT) 4 MG disintegrating tablet Place 1 tablet on the tongue Every 6 (Six) Hours As Needed for Nausea or Vomiting. 12 tablet 0   • QUEtiapine (SEROquel) 300 MG tablet Take 300 mg by mouth Every Night.       Allergies   Allergen Reactions   • Penicillin G Unknown - Low Severity       Objective    Objective     Vital Signs  Temp:  [98.5 °F (36.9 °C)] 98.5 °F (36.9 °C)  Heart Rate:  [105-133] 105  Resp:  [16-18] 16  BP: (140-164)/() 140/92  SpO2:  [94 %-97 %] 94 %  on   ;   Device (Oxygen Therapy): room air  Body mass index is 16.5 kg/m².    Physical Exam  Vitals and nursing note reviewed.   Constitutional:       General: He is not in acute distress.  HENT:      Head: Normocephalic.      Mouth/Throat:      Mouth: Mucous membranes are moist.   Eyes:      Conjunctiva/sclera: Conjunctivae normal.   Cardiovascular:      Rate and Rhythm: Regular rhythm. Tachycardia present.   Pulmonary:      Effort: Pulmonary effort is normal. No respiratory distress.      Breath sounds: Normal breath sounds.   Abdominal:      General: Bowel sounds are normal.      Palpations: Abdomen is soft.      Tenderness: There is abdominal tenderness.   Musculoskeletal:      Cervical back: Neck supple.      Right lower leg: No edema.      Left lower leg: No edema.   Skin:     General: Skin is warm and dry.   Neurological:      Mental Status: " He is alert and oriented to person, place, and time.      Motor: Tremor present.   Psychiatric:         Mood and Affect: Mood is anxious.         Behavior: Behavior is cooperative.         Results Review:  I reviewed the patient's new clinical results.  I reviewed the patient's new imaging results and agree with the interpretation.  I reviewed the patient's other test results and agree with the interpretation  I personally viewed and interpreted the patient's EKG/Telemetry data    Lab Results (last 24 hours)     Procedure Component Value Units Date/Time    CBC & Differential [[id number]]  (Abnormal) Collected: 09/13/21 0625    Specimen: Blood Updated: 09/13/21 0642    Narrative:      The following orders were created for panel order CBC & Differential.  Procedure                               Abnormality         Status                     ---------                               -----------         ------                     CBC Auto Differential[[id number]]        Abnormal            Final result                 Please view results for these tests on the individual orders.    Comprehensive Metabolic Panel [[id number]]  (Abnormal) Collected: 09/13/21 0625    Specimen: Blood Updated: 09/13/21 0653     Glucose 149 mg/dL      BUN 10 mg/dL      Creatinine 0.85 mg/dL      Sodium 129 mmol/L      Potassium 3.7 mmol/L      Chloride 87 mmol/L      CO2 29.4 mmol/L      Calcium 9.3 mg/dL      Total Protein 7.3 g/dL      Albumin 4.50 g/dL      ALT (SGPT) 28 U/L      AST (SGOT) 54 U/L      Alkaline Phosphatase 100 U/L      Total Bilirubin 0.9 mg/dL      eGFR Non African Amer 94 mL/min/1.73      Globulin 2.8 gm/dL      A/G Ratio 1.6 g/dL      BUN/Creatinine Ratio 11.8     Anion Gap 12.6 mmol/L     Narrative:      GFR Normal >60  Chronic Kidney Disease <60  Kidney Failure <15      Lipase [[id number]]  (Normal) Collected: 09/13/21 0625    Specimen: Blood Updated: 09/13/21 0653     Lipase 27 U/L     Protime-INR [[id number]]  (Normal)  Collected: 09/13/21 0625    Specimen: Blood Updated: 09/13/21 0653     Protime 13.6 Seconds      INR 1.06    aPTT [[id number]]  (Normal) Collected: 09/13/21 0625    Specimen: Blood Updated: 09/13/21 0653     PTT 27.6 seconds     Magnesium [[id number]]  (Normal) Collected: 09/13/21 0625    Specimen: Blood Updated: 09/13/21 0653     Magnesium 2.0 mg/dL     Ethanol [[id number]] Collected: 09/13/21 0625    Specimen: Blood Updated: 09/13/21 0653     Ethanol <10 mg/dL      Ethanol % <0.010 %     CBC Auto Differential [[id number]]  (Abnormal) Collected: 09/13/21 0625    Specimen: Blood Updated: 09/13/21 0642     WBC 18.71 10*3/mm3      RBC 4.73 10*6/mm3      Hemoglobin 14.4 g/dL      Hematocrit 41.1 %      MCV 86.9 fL      MCH 30.4 pg      MCHC 35.0 g/dL      RDW 14.1 %      RDW-SD 43.9 fl      MPV 9.2 fL      Platelets 365 10*3/mm3      Neutrophil % 82.7 %      Lymphocyte % 6.9 %      Monocyte % 9.7 %      Eosinophil % 0.0 %      Basophil % 0.2 %      Immature Grans % 0.5 %      Neutrophils, Absolute 15.48 10*3/mm3      Lymphocytes, Absolute 1.29 10*3/mm3      Monocytes, Absolute 1.81 10*3/mm3      Eosinophils, Absolute 0.00 10*3/mm3      Basophils, Absolute 0.04 10*3/mm3      Immature Grans, Absolute 0.09 10*3/mm3      nRBC 0.0 /100 WBC     COVID PRE-OP / PRE-PROCEDURE SCREENING ORDER (NO ISOLATION) - Swab, Nasopharynx [[id number]]  (Normal) Collected: 09/13/21 0824    Specimen: Swab from Nasopharynx Updated: 09/13/21 0925    Narrative:      The following orders were created for panel order COVID PRE-OP / PRE-PROCEDURE SCREENING ORDER (NO ISOLATION) - Swab, Nasopharynx.  Procedure                               Abnormality         Status                     ---------                               -----------         ------                     COVID-19, KAI IN-HOUSE...[[id number]]  Normal              Final result                 Please view results for these tests on the individual orders.    COVID-19, KAI IN-HOUSE  CEPHEID/ANA MARIA NP SWAB IN TRANSPORT MEDIA 8-12 HR TAT - Swab, Nasopharynx [[id number]]  (Normal) Collected: 09/13/21 0824    Specimen: Swab from Nasopharynx Updated: 09/13/21 0925     COVID19 Not Detected    Narrative:      Fact sheet for providers: https://www.fda.gov/media/311546/download     Fact sheet for patients: https://www.fda.gov/media/534412/download          Imaging Results (Last 24 Hours)     Procedure Component Value Units Date/Time    CT Abdomen Pelvis With Contrast [[id number]] Collected: 09/13/21 0805     Updated: 09/13/21 0805    Narrative:      CT ABDOMEN AND PELVIS WITH CONTRAST     HISTORY: Upper abdominal pain with hematemesis.     TECHNIQUE: Axial CT images of the abdomen and pelvis were obtained  following administration of intravenous contrast. The patient was not  given oral contrast Coronal and sagittal reformats were obtained.     COMPARISON: 08/26/2021     FINDINGS: There is marked low-density wall thickening within the distal  esophagus that has significantly increased in the interim from prior CT,  most concerning for esophagitis. No pneumomediastinum identified within  the visualized abdomen.     Evaluation of the upper abdomen is limited secondary to motion. The  proximal small bowel loops are mildly dilated most suggestive of mild  ileus. Otherwise, there is no evidence of bowel obstruction. The  appendix is normal. Moderate amount of formed stool is seen within the  colon. No focal hepatic lesions nor intrahepatic biliary dilatation.  There is a mildly prominent portacaval lymph node that is favored to be  reactive. The gallbladder is normal. 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 The pancreas is  normal without ductal dilatation. Bilateral adrenal glands are normal.  Both kidneys are normal in size and attenuation. No hydronephrosis. The  urinary bladder is partially distended and normal. There is no  pathological retroperitoneal  lymphadenopathy. No ascites.       Impression:      There has been interval marked progression of the distal  esophageal wall thickening that is low-density and circumferential. No  pneumomediastinum within the visualized portion. Otherwise the abdomen  and pelvis is likely unremarkable.     These findings were discussed with Dr. Schilling by telephone.     Radiation dose reduction techniques were utilized, including automated  exposure control and exposure modulation based on body size.                    No orders to display        Assessment/Plan     Active Hospital Problems    Diagnosis  POA   • **Hematemesis with nausea [K92.0]  Yes   • Esophageal thickening [K22.8]  Yes   • History of DVT (deep vein thrombosis) [Z86.718]  Not Applicable   • Hyponatremia [E87.1]  Yes   • Alcohol dependence (CMS/HCC) [F10.20]  Yes   • Leukocytosis [D72.829]  Yes      Resolved Hospital Problems   No resolved problems to display.       Mr. Villalta is a 56 y.o. male with a history of schizophrenia, alcohol dependence, hepatitis C who is admitted for esophageal thickening with n/v/hematemsis    -GI consult. NPO. IVF's. PPI gtt. Monitor Hgb, transfuse if needed. Antiemetics. Has been off Eliquis for ~ 1 week. No definite hx of esophageal varices  -Na 129. Likely due to GI loss/alcohol use. Monitor  -Access to follow. CIWA protocol  -WBC 18.71. Afebrile. May be reactive. Will trend. Denies resp/ symptoms    Further recommendations based on hospital course    Home meds pending reconciliation    · I discussed the patient's findings and my recommendations with patient and Dr. Coker.    VTE Prophylaxis - SCDs.  Code Status - Full code.       KAREL Glass  [facility]  09/13/21  12:29 EDT      Electronically signed by Dk Coker MD at 09/13/21 0509          Emergency Department Notes      Danie Monique, RN at 09/13/21 5791        Pt to ED via EMS from a local [facility]'s. Pt is curretly without a  primary residence. EMS reports they were called because the pt had been vomiting coffee ground emesis and also had some bright red vomiting in route. Pt is a long standing alcoholic and report history of esophageal varices. Pt denies EtOh tonight.     This RN in appropriate PPE for all patient care interactions. Pt masked and redirected for proper mask use when necessary. Hand hygiene performed before and after all patient care interactions.      Electronically signed by Danie Monique RN at 09/13/21 0555     Olivier Plata PA at 09/13/21 0613     Attestation signed by Vishal Schilling MD at 09/13/21 0904          For this patient encounter, I reviewed the NP or PA documentation, treatment plan, and medical decision making. Vishal Schilling MD 9/13/2021 09:04 EDT                   EMERGENCY DEPARTMENT ENCOUNTER    Room Number:  11/11  Date of encounter:  9/13/2021  PCP: Provider, No Known  Historian: Patient      I used full protective equipment while examining this patient.  This includes face mask, gloves and protective eyewear.  I washed my hands before entering the room and immediately upon leaving the room      HPI:  Chief Complaint: Vomiting  A complete HPI/ROS/PMH/PSH/SH/FH are unobtainable due to: Patient is a very poor historian, intoxication    Context: David Villalta is a 56 y.o. male who presents to the ED c/o vomiting starting tonight.  Patient has noticed coffee-ground looking emesis, as well as bright red bleeding in his vomitus tonight.  Patient is a poor historian.  He describes diffuse abdominal pain.  He denies any diarrhea or dark stools.  Patient does have a history of DVT and is prescribed Eliquis, however he has not had a dose in greater than 1 week.  He is a daily drinker and states he was drinking just prior to arrival.  Patient is homeless.  Patient believes he has a history of esophageal varices however he is unsure.    Review of Medical Records  I reviewed patient's last admission from  "8/26/2021.  Patient admitted for esophagitis.    PAST MEDICAL HISTORY  Active Ambulatory Problems     Diagnosis Date Noted   • No Active Ambulatory Problems     Resolved Ambulatory Problems     Diagnosis Date Noted   • No Resolved Ambulatory Problems     Past Medical History:   Diagnosis Date   • Anxiety    • Depression    • Hepatitis C    • Hx of drug abuse (CMS/HCC)          PAST SURGICAL HISTORY  Past Surgical History:   Procedure Laterality Date   • ANKLE SURGERY     • SPLENECTOMY           FAMILY HISTORY  History reviewed. No pertinent family history.      SOCIAL HISTORY  Social History     Socioeconomic History   • Marital status: Single     Spouse name: Not on file   • Number of children: Not on file   • Years of education: Not on file   • Highest education level: Not on file   Tobacco Use   • Smoking status: Current Every Day Smoker   • Smokeless tobacco: Never Used   Substance and Sexual Activity   • Alcohol use: Yes     Comment: \"3 cases of beer a week & 5-6 bottles of wine. Sometimes some 5ths.\"   • Drug use: Yes     Comment: \"meth\"   • Sexual activity: Defer         ALLERGIES  Penicillin g        REVIEW OF SYSTEMS  All systems reviewed and negative except for those discussed in HPI.       PHYSICAL EXAM    I have reviewed the triage vital signs and nursing notes.    ED Triage Vitals [09/13/21 0541]   Temp Heart Rate Resp BP SpO2   98.5 °F (36.9 °C) (!) 133 18 (!) 158/102 97 %      Temp src Heart Rate Source Patient Position BP Location FiO2 (%)   -- Monitor -- -- --       Physical Exam  GENERAL: Alert, oriented, disheveled, not distressed  HENT: head atraumatic, no nuchal rigidity  EYES: no scleral icterus, EOMI  CV: regular rhythm, tachycardic rate, no murmur  RESPIRATORY: normal effort, CTA  ABDOMEN: soft, nontender  MUSCULOSKELETAL: no deformity, FROM, no calf swelling or tenderness  NEURO: alert, moves all extremities, follows commands  SKIN: warm, dry        LAB RESULTS  Recent Results (from the past " 24 hour(s))   Comprehensive Metabolic Panel    Collection Time: 09/13/21  6:25 AM    Specimen: Blood   Result Value Ref Range    Glucose 149 (H) 65 - 99 mg/dL    BUN 10 6 - 20 mg/dL    Creatinine 0.85 0.76 - 1.27 mg/dL    Sodium 129 (L) 136 - 145 mmol/L    Potassium 3.7 3.5 - 5.2 mmol/L    Chloride 87 (L) 98 - 107 mmol/L    CO2 29.4 (H) 22.0 - 29.0 mmol/L    Calcium 9.3 8.6 - 10.5 mg/dL    Total Protein 7.3 6.0 - 8.5 g/dL    Albumin 4.50 3.50 - 5.20 g/dL    ALT (SGPT) 28 1 - 41 U/L    AST (SGOT) 54 (H) 1 - 40 U/L    Alkaline Phosphatase 100 39 - 117 U/L    Total Bilirubin 0.9 0.0 - 1.2 mg/dL    eGFR Non African Amer 94 >60 mL/min/1.73    Globulin 2.8 gm/dL    A/G Ratio 1.6 g/dL    BUN/Creatinine Ratio 11.8 7.0 - 25.0    Anion Gap 12.6 5.0 - 15.0 mmol/L   Lipase    Collection Time: 09/13/21  6:25 AM    Specimen: Blood   Result Value Ref Range    Lipase 27 13 - 60 U/L   Protime-INR    Collection Time: 09/13/21  6:25 AM    Specimen: Blood   Result Value Ref Range    Protime 13.6 11.7 - 14.2 Seconds    INR 1.06 0.90 - 1.10   aPTT    Collection Time: 09/13/21  6:25 AM    Specimen: Blood   Result Value Ref Range    PTT 27.6 22.7 - 35.4 seconds   Type & Screen    Collection Time: 09/13/21  6:25 AM    Specimen: Blood   Result Value Ref Range    ABO Type A     RH type Positive     Antibody Screen Negative     T&S Expiration Date 9/16/2021 11:59:59 PM    Magnesium    Collection Time: 09/13/21  6:25 AM    Specimen: Blood   Result Value Ref Range    Magnesium 2.0 1.6 - 2.6 mg/dL   Ethanol    Collection Time: 09/13/21  6:25 AM    Specimen: Blood   Result Value Ref Range    Ethanol <10 0 - 10 mg/dL    Ethanol % <0.010 %   CBC Auto Differential    Collection Time: 09/13/21  6:25 AM    Specimen: Blood   Result Value Ref Range    WBC 18.71 (H) 3.40 - 10.80 10*3/mm3    RBC 4.73 4.14 - 5.80 10*6/mm3    Hemoglobin 14.4 13.0 - 17.7 g/dL    Hematocrit 41.1 37.5 - 51.0 %    MCV 86.9 79.0 - 97.0 fL    MCH 30.4 26.6 - 33.0 pg    MCHC 35.0  31.5 - 35.7 g/dL    RDW 14.1 12.3 - 15.4 %    RDW-SD 43.9 37.0 - 54.0 fl    MPV 9.2 6.0 - 12.0 fL    Platelets 365 140 - 450 10*3/mm3    Neutrophil % 82.7 (H) 42.7 - 76.0 %    Lymphocyte % 6.9 (L) 19.6 - 45.3 %    Monocyte % 9.7 5.0 - 12.0 %    Eosinophil % 0.0 (L) 0.3 - 6.2 %    Basophil % 0.2 0.0 - 1.5 %    Immature Grans % 0.5 0.0 - 0.5 %    Neutrophils, Absolute 15.48 (H) 1.70 - 7.00 10*3/mm3    Lymphocytes, Absolute 1.29 0.70 - 3.10 10*3/mm3    Monocytes, Absolute 1.81 (H) 0.10 - 0.90 10*3/mm3    Eosinophils, Absolute 0.00 0.00 - 0.40 10*3/mm3    Basophils, Absolute 0.04 0.00 - 0.20 10*3/mm3    Immature Grans, Absolute 0.09 (H) 0.00 - 0.05 10*3/mm3    nRBC 0.0 0.0 - 0.2 /100 WBC       Ordered the above labs and independently reviewed the results.        RADIOLOGY  CT Abdomen Pelvis With Contrast    Result Date: 9/13/2021  CT ABDOMEN AND PELVIS WITH CONTRAST  HISTORY: Upper abdominal pain with hematemesis.  TECHNIQUE: Axial CT images of the abdomen and pelvis were obtained following administration of intravenous contrast. The patient was not given oral contrast Coronal and sagittal reformats were obtained.  COMPARISON: 08/26/2021  FINDINGS: There is marked low-density wall thickening within the distal esophagus that has significantly increased in the interim from prior CT, most concerning for esophagitis. No pneumomediastinum identified within the visualized abdomen.  Evaluation of the upper abdomen is limited secondary to motion. The proximal small bowel loops are mildly dilated most suggestive of mild ileus. Otherwise, there is no evidence of bowel obstruction. The appendix is normal. Moderate amount of formed stool is seen within the colon. No focal hepatic lesions nor intrahepatic biliary dilatation. There is a mildly prominent portacaval lymph node that is favored to be reactive. The gallbladder is normal. There appears to have been a prior splenectomy with small accessory splenules present. The pancreas is  normal without ductal dilatation. Bilateral adrenal glands are normal. Both kidneys are normal in size and attenuation. No hydronephrosis. The urinary bladder is partially distended and normal. There is no pathological retroperitoneal lymphadenopathy. No ascites.      There has been interval marked progression of the distal esophageal wall thickening that is low-density and circumferential. No pneumomediastinum within the visualized portion. Otherwise the abdomen and pelvis is likely unremarkable.  These findings were discussed with Dr. Schilling by telephone.  Radiation dose reduction techniques were utilized, including automated exposure control and exposure modulation based on body size.         I ordered the above noted radiological studies. Reviewed by me and discussed with radiologist.  See dictation for official radiology interpretation.      MEDICATIONS GIVEN IN ER    Medications   sodium chloride 0.9 % flush 10 mL (has no administration in time range)   pantoprazole (PROTONIX) injection 80 mg (80 mg Intravenous Given 9/13/21 0803)     And   pantoprazole (PROTONIX) 40 mg in 100 mL NS (VTB) (8 mg/hr Intravenous New Bag 9/13/21 0804)   sodium chloride 0.9 % infusion (has no administration in time range)   sodium chloride 0.9 % bolus 1,000 mL (1,000 mL Intravenous New Bag 9/13/21 0655)   ondansetron (ZOFRAN) injection 4 mg (4 mg Intravenous Given 9/13/21 0656)   iopamidol (ISOVUE-300) 61 % injection 100 mL (85 mL Intravenous Given 9/13/21 0741)   LORazepam (ATIVAN) injection 1 mg (1 mg Intravenous Given 9/13/21 0826)         PROGRESS, DATA ANALYSIS, CONSULTS, AND MEDICAL DECISION MAKING    All labs have been independently reviewed by me.  All radiology studies have been reviewed by me and discussed with radiologist dictating the report.   EKG's independently viewed and interpreted by me.  Discussion below represents my analysis of pertinent findings related to patient's condition, differential diagnosis, treatment  plan and final disposition.    I have discussed case with Dr. Schilling, emergency room physician.  He has performed his own bedside examination and agrees with treatment plan.    ED Course as of Sep 13 0839   Mon Sep 13, 2021   0612 Patient with coffee-ground emesis today.  Patient has been off his Eliquis for greater than 1 week.  Patient is an alcoholic.  Differential diagnoses include but not limited to esophageal bleed, gastritis, upper GI bleed.    [EE]   0655 Hemoglobin: 14.4 [EE]   0655 WBC(!): 18.71 [EE]   0655 Sodium(!): 129 [EE]   0655 Magnesium: 2.0 [EE]   0655 Ethanol: <10 [EE]   0701 The patient's white count is elevated 18 however his hemoglobin is stable at 14.  I will start a Protonix bolus and drip on the patient and order abdominal CT scan for further evaluation.    [GP]   0758 I spoke with the radiologist-Dr. Ribera about the abdominal CT scan which shows a markedly thickened distal esophagus that is markedly progressed since his last CT on August 26.  I believe the patient will need to be admitted to the hospital for further evaluation and care and likely EGD.    [GP]   0816 Upon repeat evaluation the patient is resting comfortably and has had no further hematemesis.  His heart rate is 109.  His systolic blood pressure is 159.  I advised him of his elevated white count, esophageal thickening and need for admission for further evaluation and care.  I will give him a dose of Ativan because he states that he does have a history of drinking excessively and has had alcohol withdrawal before.    [GP]   0836 I discussed the case with Dr. Coker from [facility].  We will admit the patient to a telemetry bed as an inpatient for further evaluation and care.    [GP]      ED Course User Index  [EE] Olivier Plata PA  [GP] Vishal Schilling MD       AS OF 08:39 EDT VITALS:    BP - 150/97  HR - 119  TEMP - 98.5 °F (36.9 °C)  O2 SATS - 97%        DIAGNOSIS  Final diagnoses:   Hematemesis with nausea   Esophageal  thickening   History of alcohol abuse         DISPOSITION  Admitted           Boni Olivier VILLAGRAN, PA  09/13/21 0839      Electronically signed by Vishal Schilling MD at 09/13/21 0904     Vishal Schilling MD at 09/13/21 0703        Pt presents to the ED complaining of vomiting blood starting tonight.  The patient tells me has had 2 to 3 days of black stools and began vomiting blood tonight with upper abdominal pain.  The patient states that he had been on Eliquis for approximately 2 years for DVT but has not taken it in over a week.  The patient was seen here not long ago with a CT scan which showed esophagitis.  Currently the patient complains of epigastric pain.    On exam, pt is A&Ox3. NAD  PERRL, moist mucous membranes.  Normocephalic and atraumatic  Heart is tachycardic to 110. Lungs are CTAB.   Abd is soft with epigastric tenderness with guarding but no rebound and diminished bowel sounds  No pedal edema.  No calf tenderness.  Nonfocal neuro exam      I agree with midlevel plan to provide IV fluids while obtaining lab work, Protonix, Zofran and an abdominal CT for further evaluation    PPE  Pt does not present with symptoms for COVID19; however, I was wearing a N95 mask and goggles throughout all patient interaction.    The patient CT scan shows marked distal esophagus thickening.  His hemoglobin is stable.  I have given him IV fluids, a Protonix bolus and drip and Ativan to prevent alcohol withdrawal.  We have discussed the case with Dr. Coker from [facility] who will admit the patient to a telemetry bed.    The KODY and I have discussed this patient's history, physical exam, and treatment plan.  I have reviewed the documentation and personally had a face to face interaction with the patient. I affirm the documentation and agree with the treatment and plan.  The attached note describes my personal findings.           Vishal Schilling MD  09/13/21 0904      Electronically signed by Vishal Schilling MD at 09/13/21 0904      Anali Enriquez RN at 09/13/21 1330        Pt standing up , needing to void. Provide pt with clean adult depends as no urinal available and he was urinating in his pants.  Urinal obtained and provided to pt. Pt tolerated well, place back into bed, vitals stable.     Anali Enriquez RN  09/13/21 1331      Electronically signed by Anali Enriquez RN at 09/13/21 1331     Anali Enriquez RN at 09/13/21 1517        Pt had gotten up and pulled out his Iv. Started new Iv to left hands restarted all meds     Anali Enriquez RN  09/13/21 1518      Electronically signed by Anali Enriquez RN at 09/13/21 1518     Anali Enriquez RN at 09/13/21 1611        PPE per protocol utilized for each encounter       Anali Enriquez RN  09/13/21 1611      Electronically signed by Anali Enriquez RN at 09/13/21 1611       Vital Signs (last 2 days)     Date/Time   Temp   Temp src   Pulse   Resp   BP   Patient Position   SpO2    09/14/21 1153   --   --   65   16   (!) 88/51   Lying   93    09/14/21 1143   --   --   64   14   (!) 85/48   Lying   99    09/14/21 1052   --   --   69   16   116/80   Lying   96    09/14/21 0820   --   --   73   18   97/72   Lying   --    Temp: pt refusd at 09/14/21 0820    09/14/21 0752   --   --   --   --   --   --   --    Temp: pt refused at 09/14/21 0752    BP: pt refused at 09/14/21 0752    09/14/21 0730   --   --   --   --   --   --   --    Temp: pt refused at 09/14/21 0730    BP: pt refused at 09/14/21 0730    09/14/21 0722   --   --   75   16   --   Lying   94    Temp: pt refused at 09/14/21 0722    BP: pt refused at 09/14/21 0722    09/14/21 0512   --   --   82   16   91/57   Lying   94    Temp: pt refused oral temperature.  at 09/14/21 0512    09/14/21 0052   --   --   85   16   --   --   93    09/13/21 2334   98.4 (36.9)   Oral   88   20   107/59   Lying   90    09/13/21 2053   --   --   99   20   --   --   94    09/13/21 1921   98.8 (37.1)   Oral   99   20   119/90    Lying   94    09/13/21 1900   --   --   95   --   --   --   97    09/13/21 1736   99.6 (37.6)   Oral   98   20   118/82   Lying   95    09/13/21 1610   98.2 (36.8)   Tympanic   91   16   --   --   --    09/13/21 1602   --   --   93   --   --   --   93    09/13/21 1531   --   --   96   --   119/81   --   95    09/13/21 15:15:35   --   --   93   16   120/85   Lying   94    09/13/21 1442   --   --   91   --   --   --   95    09/13/21 1411   --   --   94   --   121/77   --   95    09/13/21 1331   --   --   96   --   121/74   --   96    09/13/21 1327   --   --   --   --   135/97   --   --    09/13/21 1015   --   --   105   16   140/92   --   94    09/13/21 0900   --   --   106   --   --   --   94    09/13/21 08:04:13   --   --   --   --   150/97   --   --    09/13/21 0624   --   --   119   --   (!) 164/110   --   97    09/13/21 0541   98.5 (36.9)   --   (!) 133   18   (!) 158/102   --   97            Oxygen Therapy (last 2 days)     Date/Time   SpO2   Device (Oxygen Therapy)   Flow (L/min)   Oxygen Concentration (%)   ETCO2 (mmHg)    09/14/21 1153   93   room air   --   --   --    09/14/21 1143   99   simple face mask   10   --   --    09/14/21 1052   96   room air   --   --   --    09/14/21 0922   --   room air   --   --   --    09/14/21 0722   94   room air   --   --   --    09/14/21 0512   94   room air   --   --   --    09/14/21 0052   93   room air   --   --   --    09/14/21 0048   --   room air   --   --   --    09/13/21 2334   90   room air   --   --   --    09/13/21 2053   94   room air   --   --   --    09/13/21 1936   --   room air   --   --   --    09/13/21 1921   94   room air   --   --   --    09/13/21 1900   97   --   --   --   --    09/13/21 1736   95   room air   --   --   --    09/13/21 1602   93   --   --   --   --    09/13/21 1531   95   --   --   --   --    09/13/21 15:15:35   94   room air   --   --   --    09/13/21 1442   95   --   --   --   --    09/13/21 1411   95   --   --   --   --    09/13/21  1331   96   --   --   --   --    09/13/21 1015   94   --   --   --   --    09/13/21 0900   94   --   --   --   --    09/13/21 0624   97   --   --   --   --    09/13/21 0541   97   room air   --   --   --            Intake & Output (last 2 days)       09/12 0701 - 09/13 0700 09/13 0701 - 09/14 0700 09/14 0701 - 09/15 0700    P.O.  300     IV Piggyback  1000     Total Intake(mL/kg)  1300 (15.5)     Urine (mL/kg/hr)  2775 (1.4)     Total Output  2775     Net  -1475                Lines, Drains & Airways    Active LDAs     Name:   Placement date:   Placement time:   Site:   Days:    Peripheral IV 09/13/21 1514 Left Hand   09/13/21    1514    Hand   less than 1         Inactive LDAs     Name:   Placement date:   Placement time:   Removal date:   Removal time:   Site:   Days:    [REMOVED] Peripheral IV 09/13/21 0645 Right Antecubital   09/13/21    0645    09/13/21    1510    Antecubital   less than 1                CIWA (last 2 days)     Date/Time CIWA-Ar Score    09/14/21 0521  7    09/14/21 0415  13    09/14/21 0048  3    09/13/21 2058  9    09/13/21 1936  7    09/13/21 1736  8    09/13/21 15:16:26  9    09/13/21 13:28:38  14    09/13/21 12:09:22  18    09/13/21 10:18:08  20    09/13/21 08:15:02  15          Facility-Administered Medications as of 9/14/2021   Medication Dose Route Frequency Provider Last Rate Last Admin   • acetaminophen (TYLENOL) tablet 650 mg  650 mg Oral Q4H PRN Shyla Cordova APRN        Or   • acetaminophen (TYLENOL) 160 MG/5ML solution 650 mg  650 mg Oral Q4H PRN Shyla Cordova APRN        Or   • acetaminophen (TYLENOL) suppository 650 mg  650 mg Rectal Q4H PRN Shyla Cordova APRN       • thiamine (VITAMIN B-1) tablet 100 mg  100 mg Oral Daily Dk Coker MD        And   • multivitamin (THERAGRAN) tablet 1 tablet  1 tablet Oral Daily Dk Coker MD        And   • folic acid (FOLVITE) tablet 1 mg  1 mg Oral Daily Dk Coker MD       • [COMPLETED] iopamidol  (ISOVUE-300) 61 % injection 100 mL  100 mL Intravenous Once in imaging Vishal Schilling MD   85 mL at 09/13/21 0741   • [COMPLETED] LORazepam (ATIVAN) injection 1 mg  1 mg Intravenous Once Vishal Schilling MD   1 mg at 09/13/21 0826   • LORazepam (ATIVAN) tablet 1 mg  1 mg Oral Q2H PRN Dk Coker MD   1 mg at 09/13/21 2111    Or   • LORazepam (ATIVAN) injection 1 mg  1 mg Intravenous Q2H PRN Dk Coker MD   1 mg at 09/13/21 1814    Or   • LORazepam (ATIVAN) tablet 2 mg  2 mg Oral Q1H PRN Dk Coker MD        Or   • LORazepam (ATIVAN) injection 2 mg  2 mg Intravenous Q1H PRN Dk Coker MD   2 mg at 09/14/21 0420    Or   • LORazepam (ATIVAN) injection 2 mg  2 mg Intravenous Q15 Min PRN Dk Coker MD   2 mg at 09/13/21 1216    Or   • LORazepam (ATIVAN) injection 2 mg  2 mg Intramuscular Q15 Min PRN Dk Coker MD       • multivitamin with minerals 1 tablet  1 tablet Oral Daily Dk Coker MD       • nitroglycerin (NITROSTAT) SL tablet 0.4 mg  0.4 mg Sublingual Q5 Min PRN Shyla Cordova APRN       • [COMPLETED] ondansetron (ZOFRAN) injection 4 mg  4 mg Intravenous Once Olivier Plata PA   4 mg at 09/13/21 0656   • ondansetron (ZOFRAN) tablet 4 mg  4 mg Oral Q6H PRN Shyla Cordova APRN        Or   • ondansetron (ZOFRAN) injection 4 mg  4 mg Intravenous Q6H PRN Shyla Cordova APRN   4 mg at 09/13/21 1812   • pantoprazole (PROTONIX) EC tablet 40 mg  40 mg Oral BID AC Allyson Magaña MD       • [COMPLETED] pantoprazole (PROTONIX) injection 80 mg  80 mg Intravenous Once Vishal Schilling MD   80 mg at 09/13/21 0803   • QUEtiapine (SEROquel) tablet 300 mg  300 mg Oral Nightly Coker, Dk, MD   300 mg at 09/13/21 2131   • [COMPLETED] sodium chloride 0.9 % bolus 1,000 mL  1,000 mL Intravenous Once Olivier Plata PA   Stopped at 09/13/21 0851   • sodium chloride 0.9 % flush 10 mL  10 mL Intravenous PRN Olivier Plata PA       • sodium chloride 0.9 % flush 10 mL  10 mL  Intravenous PRAllyson Corona MD       • sodium chloride 0.9 % infusion  125 mL/hr Intravenous Continuous Vishal Schilling  mL/hr at 09/14/21 0510 125 mL/hr at 09/14/21 0510   • sodium chloride 0.9 % infusion  30 mL/hr Intravenous Continuous Allyson Elise MD 30 mL/hr at 09/14/21 1105 30 mL/hr at 09/14/21 1105   • sucralfate (CARAFATE) tablet 1 g  1 g Oral TID AC Allyson Magaña MD       • [COMPLETED] thiamine (B-1) 100 mg in sodium chloride 0.9 % 100 mL IVPB  100 mg Intravenous Once Dk Coker MD        Or   • [COMPLETED] thiamine (VITAMIN B-1) tablet 100 mg  100 mg Oral Once Dk Coker MD   100 mg at 09/13/21 1016         Lab Results (last 48 hours)     Procedure Component Value Units Date/Time    Hemoglobin & Hematocrit, Blood [[id number]]  (Abnormal) Collected: 09/14/21 0955    Specimen: Blood Updated: 09/14/21 1041     Hemoglobin 11.9 g/dL      Hematocrit 35.7 %     Comprehensive Metabolic Panel [[id number]]  (Abnormal) Collected: 09/13/21 2358    Specimen: Blood Updated: 09/14/21 0100     Glucose 94 mg/dL      BUN 6 mg/dL      Creatinine 0.82 mg/dL      Sodium 137 mmol/L      Potassium 3.8 mmol/L      Chloride 102 mmol/L      CO2 26.4 mmol/L      Calcium 8.3 mg/dL      Total Protein 6.0 g/dL      Albumin 3.50 g/dL      ALT (SGPT) 21 U/L      AST (SGOT) 42 U/L      Alkaline Phosphatase 79 U/L      Total Bilirubin 0.8 mg/dL      eGFR Non African Amer 98 mL/min/1.73      Globulin 2.5 gm/dL      A/G Ratio 1.4 g/dL      BUN/Creatinine Ratio 7.3     Anion Gap 8.6 mmol/L     Narrative:      GFR Normal >60  Chronic Kidney Disease <60  Kidney Failure <15      Hemoglobin & Hematocrit, Blood [[id number]]  (Abnormal) Collected: 09/13/21 2358    Specimen: Blood Updated: 09/14/21 0034     Hemoglobin 11.7 g/dL      Hematocrit 34.9 %     CBC (No Diff) [[id number]]  (Abnormal) Collected: 09/13/21 3448    Specimen: Blood Updated: 09/14/21 0034     WBC 17.87 10*3/mm3      RBC 3.87 10*6/mm3      Hemoglobin  11.7 g/dL      Hematocrit 34.9 %      MCV 90.2 fL      MCH 30.2 pg      MCHC 33.5 g/dL      RDW 14.1 %      RDW-SD 45.4 fl      MPV 9.5 fL      Platelets 298 10*3/mm3     Hemoglobin & Hematocrit, Blood [[id number]]  (Abnormal) Collected: 09/13/21 1909    Specimen: Blood Updated: 09/13/21 1943     Hemoglobin 12.4 g/dL      Hematocrit 35.7 %     Urine Drug Screen - Urine, Clean Catch [[id number]]  (Normal) Collected: 09/13/21 1804    Specimen: Urine, Clean Catch Updated: 09/13/21 1850     Amphet/Methamphet, Screen Negative     Barbiturates Screen, Urine Negative     Benzodiazepine Screen, Urine Negative     Cocaine Screen, Urine Negative     Opiate Screen Negative     THC, Screen, Urine Negative     Methadone Screen, Urine Negative     Oxycodone Screen, Urine Negative    Narrative:      Negative Thresholds Per Drugs Screened:    Amphetamines                 500 ng/ml  Barbiturates                 200 ng/ml  Benzodiazepines              100 ng/ml  Cocaine                      300 ng/ml  Methadone                    300 ng/ml  Opiates                      300 ng/ml  Oxycodone                    100 ng/ml  THC                           50 ng/ml    The Normal Value for all drugs tested is negative. This report includes final unconfirmed screening results to be used for medical treatment purposes only. Unconfirmed results must not be used for non-medical purposes such as employment or legal testing. Clinical consideration should be applied to any drug of abuse test, particularly when unconfirmed results are used.            COVID PRE-OP / PRE-PROCEDURE SCREENING ORDER (NO ISOLATION) - Swab, Nasopharynx [[id number]]  (Normal) Collected: 09/13/21 0824    Specimen: Swab from Nasopharynx Updated: 09/13/21 0925    Narrative:      The following orders were created for panel order COVID PRE-OP / PRE-PROCEDURE SCREENING ORDER (NO ISOLATION) - Swab, Nasopharynx.  Procedure                               Abnormality         Status                      ---------                               -----------         ------                     COVID-19,[facility] IN-HOUSE...[[id number]]  Normal              Final result                 Please view results for these tests on the individual orders.    COVID-19,[facility] IN-HOUSE CEPHEID/ANA MARIA NP SWAB IN TRANSPORT MEDIA 8-12 HR TAT - Swab, Nasopharynx [[id number]]  (Normal) Collected: 09/13/21 0824    Specimen: Swab from Nasopharynx Updated: 09/13/21 0925     COVID19 Not Detected    Narrative:      Fact sheet for providers: https://www.fda.gov/media/258145/download     Fact sheet for patients: https://www.fda.gov/media/027020/download    aPTT [[id number]]  (Normal) Collected: 09/13/21 0625    Specimen: Blood Updated: 09/13/21 0653     PTT 27.6 seconds     Protime-INR [[id number]]  (Normal) Collected: 09/13/21 0625    Specimen: Blood Updated: 09/13/21 0653     Protime 13.6 Seconds      INR 1.06    Comprehensive Metabolic Panel [[id number]]  (Abnormal) Collected: 09/13/21 0625    Specimen: Blood Updated: 09/13/21 0653     Glucose 149 mg/dL      BUN 10 mg/dL      Creatinine 0.85 mg/dL      Sodium 129 mmol/L      Potassium 3.7 mmol/L      Chloride 87 mmol/L      CO2 29.4 mmol/L      Calcium 9.3 mg/dL      Total Protein 7.3 g/dL      Albumin 4.50 g/dL      ALT (SGPT) 28 U/L      AST (SGOT) 54 U/L      Alkaline Phosphatase 100 U/L      Total Bilirubin 0.9 mg/dL      eGFR Non African Amer 94 mL/min/1.73      Globulin 2.8 gm/dL      A/G Ratio 1.6 g/dL      BUN/Creatinine Ratio 11.8     Anion Gap 12.6 mmol/L     Narrative:      GFR Normal >60  Chronic Kidney Disease <60  Kidney Failure <15      Lipase [[id number]]  (Normal) Collected: 09/13/21 0625    Specimen: Blood Updated: 09/13/21 0653     Lipase 27 U/L     Ethanol [[id number]] Collected: 09/13/21 0625    Specimen: Blood Updated: 09/13/21 0653     Ethanol <10 mg/dL      Ethanol % <0.010 %     Magnesium [[id number]]  (Normal) Collected: 09/13/21 0625    Specimen: Blood Updated:  09/13/21 0653     Magnesium 2.0 mg/dL     CBC & Differential [[id number]]  (Abnormal) Collected: 09/13/21 0625    Specimen: Blood Updated: 09/13/21 0642    Narrative:      The following orders were created for panel order CBC & Differential.  Procedure                               Abnormality         Status                     ---------                               -----------         ------                     CBC Auto Differential[[id number]]        Abnormal            Final result                 Please view results for these tests on the individual orders.    CBC Auto Differential [[id number]]  (Abnormal) Collected: 09/13/21 0625    Specimen: Blood Updated: 09/13/21 0642     WBC 18.71 10*3/mm3      RBC 4.73 10*6/mm3      Hemoglobin 14.4 g/dL      Hematocrit 41.1 %      MCV 86.9 fL      MCH 30.4 pg      MCHC 35.0 g/dL      RDW 14.1 %      RDW-SD 43.9 fl      MPV 9.2 fL      Platelets 365 10*3/mm3      Neutrophil % 82.7 %      Lymphocyte % 6.9 %      Monocyte % 9.7 %      Eosinophil % 0.0 %      Basophil % 0.2 %      Immature Grans % 0.5 %      Neutrophils, Absolute 15.48 10*3/mm3      Lymphocytes, Absolute 1.29 10*3/mm3      Monocytes, Absolute 1.81 10*3/mm3      Eosinophils, Absolute 0.00 10*3/mm3      Basophils, Absolute 0.04 10*3/mm3      Immature Grans, Absolute 0.09 10*3/mm3      nRBC 0.0 /100 WBC           Imaging Results (Last 48 Hours)     Procedure Component Value Units Date/Time    CT Abdomen Pelvis With Contrast [[id number]] Collected: 09/13/21 0805     Updated: 09/13/21 0805    Narrative:      CT ABDOMEN AND PELVIS WITH CONTRAST     HISTORY: Upper abdominal pain with hematemesis.     TECHNIQUE: Axial CT images of the abdomen and pelvis were obtained  following administration of intravenous contrast. The patient was not  given oral contrast Coronal and sagittal reformats were obtained.     COMPARISON: 08/26/2021     FINDINGS: There is marked low-density wall thickening within the distal  esophagus that  has significantly increased in the interim from prior CT,  most concerning for esophagitis. No pneumomediastinum identified within  the visualized abdomen.     Evaluation of the upper abdomen is limited secondary to motion. The  proximal small bowel loops are mildly dilated most suggestive of mild  ileus. Otherwise, there is no evidence of bowel obstruction. The  appendix is normal. Moderate amount of formed stool is seen within the  colon. No focal hepatic lesions nor intrahepatic biliary dilatation.  There is a mildly prominent portacaval lymph node that is favored to be  reactive. The gallbladder is normal. 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 The pancreas is  normal without ductal dilatation. Bilateral adrenal glands are normal.  Both kidneys are normal in size and attenuation. No hydronephrosis. The  urinary bladder is partially distended and normal. There is no  pathological retroperitoneal lymphadenopathy. No ascites.       Impression:      There has been interval marked progression of the distal  esophageal wall thickening that is low-density and circumferential. No  pneumomediastinum within the visualized portion. Otherwise the abdomen  and pelvis is likely unremarkable.     These findings were discussed with Dr. Schilling by telephone.     Radiation dose reduction techniques were utilized, including automated  exposure control and exposure modulation based on body size.                Orders (last 48 hrs)      Start     Ordered    09/15/21 0600  CBC (No Diff)  Morning Draw      09/14/21 0812    09/15/21 0600  Comprehensive Metabolic Panel  Morning Draw      09/14/21 0812    09/14/21 1730  pantoprazole (PROTONIX) EC tablet 40 mg  2 Times Daily Before Meals      09/14/21 1143    09/14/21 1145  sucralfate (CARAFATE) tablet 1 g  3 Times Daily Before Meals      09/14/21 1143    09/14/21 1143  Diet Regular; GI Soft  Diet Effective Now      09/14/21 1143    09/14/21  1131  Tissue Pathology Exam  RELEASE UPON ORDERING      09/14/21 1131    09/14/21 1048  Saline Lock & Maintain IV Access  Continuous      09/14/21 1047    09/14/21 1047  sodium chloride 0.9 % infusion  Continuous PRN      09/14/21 1047    09/14/21 1047  sodium chloride 0.9 % flush 10 mL  As Needed      09/14/21 1047    09/14/21 1047  Implement Anesthesia Orders Day of Procedure  Once      09/14/21 1046    09/14/21 1047  Obtain Informed Consent  Once      09/14/21 1046    09/14/21 0943  Inpatient Psychiatrist Consult  Once     Specialty:  Psychiatry  Provider:  Jass Rg III, MD    09/14/21 0942    09/14/21 0900  thiamine (VITAMIN B-1) tablet 100 mg  Daily      09/13/21 0854    09/14/21 0900  multivitamin (THERAGRAN) tablet 1 tablet  Daily      09/13/21 0854    09/14/21 0900  folic acid (FOLVITE) tablet 1 mg  Daily      09/13/21 0854    09/14/21 0600  Basic Metabolic Panel  Morning Draw,   Status:  Canceled      09/13/21 0852    09/14/21 0600  CBC (No Diff)  Morning Draw      09/13/21 0852    09/14/21 0600  Comprehensive Metabolic Panel  Morning Draw      09/13/21 0856    09/14/21 0001  NPO Diet  Diet Effective Midnight,   Status:  Canceled      09/13/21 1643    09/14/21 0000  PT Consult: Eval & Treat as tolerated  Once      09/13/21 0852    09/13/21 2100  QUEtiapine (SEROquel) tablet 300 mg  Nightly      09/13/21 1405    09/13/21 1643  Diet Clear Liquid  Diet Effective Now,   Status:  Canceled      09/13/21 1643    09/13/21 1643  Case Request  Once      09/13/21 1643    09/13/21 1634  Inpatient Case Management  Consult  Once     Provider:  (Not yet assigned)    09/13/21 1633    09/13/21 1613  Urine Drug Screen - Urine, Clean Catch  Once      09/13/21 1612    09/13/21 1600  Hemoglobin & Hematocrit, Blood  Every 8 Hours      09/13/21 0856    09/13/21 1500  multivitamin with minerals 1 tablet  Daily      09/13/21 1405    09/13/21 1200  Vital Signs  Every 4 Hours     Comments: Per per  hospital policy    09/13/21 0852    09/13/21 1104  Inpatient Access Center Consult  Once     Comments: Alcohol dependence and schizophrenia w/recent paranoia   Provider:  (Not yet assigned)    09/13/21 1103    09/13/21 0900  Strict Intake & Output  Every Hour      09/13/21 0852    09/13/21 0856  thiamine (B-1) 100 mg in sodium chloride 0.9 % 100 mL IVPB  Once      09/13/21 0854    09/13/21 0856  thiamine (VITAMIN B-1) tablet 100 mg  Once      09/13/21 0854    09/13/21 0854  Initiate Alcohol Withdrawal Protocol  Until Discontinued      09/13/21 0854    09/13/21 0854  Vital Signs  Per Hospital Policy      09/13/21 0854    09/13/21 0854  Continuous Pulse Oximetry  Continuous      09/13/21 0854    09/13/21 0854  Obtain Baseline Clinical [place] Withdrawal Assessment - Ar, Sedation Scale & Vital Signs  Once     Comments: Follow CIWA Scoring Reference Guide    09/13/21 0854    09/13/21 0854  Clinical [place] Withdrawal Assessment (CIWA-Ar)  Per Hospital Policy      09/13/21 0854    09/13/21 0854  If CIWA Score Less Than 8 Monitor Every 4 Hours & Then Discontinue Assessment - Restart Scoring Assessment & Protocol If Symptoms Reappear  Continuous      09/13/21 0854    09/13/21 0854  Obtain Pre & Post Sedation Scores With Every Lorazepam Dose - Hold For POSS Greater Than 2 or RASS of -3 or Less  Continuous      09/13/21 0854    09/13/21 0854  Seizure Precautions  Continuous      09/13/21 0854    09/13/21 0854  Notify Provider - Withdrawal  Until Discontinued      09/13/21 0854    09/13/21 0854  Notify Provider of Abnormal Lab Results  Until Discontinued      09/13/21 0854    09/13/21 0854  Notify Provider - Vitals  Until Discontinued      09/13/21 0854    09/13/21 0854  Safety Precautions  Continuous      09/13/21 0854    09/13/21 0853  LORazepam (ATIVAN) tablet 1 mg  Every 2 Hours PRN      09/13/21 0854    09/13/21 0853  LORazepam (ATIVAN) injection 1 mg  Every 2 Hours PRN      09/13/21 0854    09/13/21 0853  LORazepam  (ATIVAN) tablet 2 mg  Every 1 Hour PRN      09/13/21 0854    09/13/21 0853  LORazepam (ATIVAN) injection 2 mg  Every 1 Hour PRN      09/13/21 0854    09/13/21 0853  LORazepam (ATIVAN) injection 2 mg  Every 15 Minutes PRN      09/13/21 0854    09/13/21 0853  LORazepam (ATIVAN) injection 2 mg  Every 15 Minutes PRN      09/13/21 0854    09/13/21 0853  Cardiac Monitoring  Continuous,   Status:  Canceled      09/13/21 0852    09/13/21 0853  Telemetry - Maintain IV Access  Continuous,   Status:  Canceled      09/13/21 0852    09/13/21 0853  Continuous Cardiac Monitoring  Continuous      09/13/21 0852    09/13/21 0853  May Be Off Telemetry for Tests  Continuous      09/13/21 0852    09/13/21 0853  ACLS Protocol For Life Threatening Dysrhythmias (Unless Code Status Indicates Otherwise)  Continuous      09/13/21 0852    09/13/21 0853  Notify Provider if ACLS Protocol Activated  Until Discontinued      09/13/21 0852    09/13/21 0853  Daily Weights  Daily      09/13/21 0852    09/13/21 0853  Place Sequential Compression Device  Once      09/13/21 0852    09/13/21 0853  Maintain Sequential Compression Device  Continuous      09/13/21 0852    09/13/21 0853  NPO Diet  Diet Effective Now,   Status:  Canceled      09/13/21 0852    09/13/21 0853  Inpatient Gastroenterology Consult  Once     Specialty:  Gastroenterology  Provider:  Allyson Magaña MD    09/13/21 0852    09/13/21 0852  Telemetry - Pulse Oximetry  Continuous PRN,   Status:  Canceled     Comments: If Patient Develops Unresponsiveness, Acute Dyspnea, Cyanosis or Suspected Hypoxemia Start Continuous Pulse Ox Monitoring, Apply Oxygen & Notify Provider    09/13/21 0852    09/13/21 0852  acetaminophen (TYLENOL) tablet 650 mg  Every 4 Hours PRN      09/13/21 0852    09/13/21 0852  acetaminophen (TYLENOL) 160 MG/5ML solution 650 mg  Every 4 Hours PRN      09/13/21 0852    09/13/21 0852  acetaminophen (TYLENOL) suppository 650 mg  Every 4 Hours PRN      09/13/21 0852     09/13/21 0852  ondansetron (ZOFRAN) tablet 4 mg  Every 6 Hours PRN      09/13/21 0852    09/13/21 0852  ondansetron (ZOFRAN) injection 4 mg  Every 6 Hours PRN      09/13/21 0852    09/13/21 0852  nitroglycerin (NITROSTAT) SL tablet 0.4 mg  Every 5 Minutes PRN      09/13/21 0852    09/13/21 0852  Code Status and Medical Interventions:  Continuous      09/13/21 0852    09/13/21 0838  Inpatient Admission  Once      09/13/21 0837    09/13/21 0819  LORazepam (ATIVAN) injection 1 mg  Once      09/13/21 0817    09/13/21 0810  sodium chloride 0.9 % infusion  Continuous      09/13/21 0808    09/13/21 0809  [facility] (on-call MD unless specified) Details  Once     Specialty:  Hospitalist  Provider:  (Not yet assigned)    09/13/21 0808    09/13/21 0808  COVID PRE-OP / PRE-PROCEDURE SCREENING ORDER (NO ISOLATION) - Swab, Nasopharynx  Once      09/13/21 0808    09/13/21 0808  COVID-19, KAI IN-HOUSE CEPHEID/ANA MARIA NP SWAB IN TRANSPORT MEDIA 8-12 HR TAT - Swab, Nasopharynx  PROCEDURE ONCE      09/13/21 0808    09/13/21 0743  iopamidol (ISOVUE-300) 61 % injection 100 mL  Once in Imaging      09/13/21 0741    09/13/21 0705  pantoprazole (PROTONIX) injection 80 mg  Once      09/13/21 0703    09/13/21 0705  pantoprazole (PROTONIX) 40 mg in 100 mL NS (VTB)  Continuous,   Status:  Discontinued      09/13/21 0703    09/13/21 0704  CT Abdomen Pelvis With Contrast  1 Time Imaging     Comments: IV CONTRAST ONLY      09/13/21 0703    09/13/21 0614  sodium chloride 0.9 % bolus 1,000 mL  Once      09/13/21 0612    09/13/21 0614  ondansetron (ZOFRAN) injection 4 mg  Once      09/13/21 0612    09/13/21 0613  Magnesium  Once      09/13/21 0612    09/13/21 0613  Ethanol  Once      09/13/21 0612    09/13/21 0612  CBC & Differential  Once      09/13/21 0612    09/13/21 0612  Comprehensive Metabolic Panel  Once      09/13/21 0612    09/13/21 0612  Lipase  Once      09/13/21 0612    09/13/21 0612  Protime-INR  Once      09/13/21 0612    09/13/21 0612  aPTT   Once      09/13/21 0612    09/13/21 0612  Type & Screen  Once      09/13/21 0612    09/13/21 0612  Insert peripheral IV  Once      09/13/21 0612    09/13/21 0612  CBC Auto Differential  PROCEDURE ONCE      09/13/21 0612    09/13/21 0611  sodium chloride 0.9 % flush 10 mL  As Needed      09/13/21 0612    07/08/21 0000  Apixaban (ELIQUIS PO)      09/13/21 2055    Unscheduled  Oxygen Therapy- Nasal Cannula; Titrate for SPO2: 90% - 95%  Continuous PRN     Comments: If Patient Develops Unresponsiveness, Acute Dyspnea, Cyanosis or Suspected Hypoxemia Start Continuous Pulse Ox Monitoring, Apply Oxygen & Notify Provider    09/13/21 0852    Unscheduled  ECG 12 Lead  As Needed     Comments: Nurse to Release if Patient Expericences Acute Chest Pain or Dysrhythmias    09/13/21 0852    Unscheduled  Potassium  As Needed     Comments: For Ventricular Arrhythmias      09/13/21 0852    Unscheduled  Magnesium  As Needed     Comments: For Ventricular Arrhythmias      09/13/21 0852    Unscheduled  Troponin  As Needed     Comments: For Chest Pain      09/13/21 0852    Unscheduled  Blood Gas, Arterial -  As Needed     Comments: Per O2 PolicyNotify Physician      09/13/21 0852    Unscheduled  Up with assistance  As Needed      09/13/21 0852    Signed and Held  lactated ringers infusion  Continuous      Signed and Held    --  UPPER GI ENDOSCOPY      09/14/21 1119                 Operative/Procedure Notes (last 48 hours) (Notes from 09/12/21 1209 through 09/14/21 1209)      Procedures signed by Allyson Magaña MD at 09/14/21 1142       Procedure Orders    1. UPPER GI ENDOSCOPY [[id number]] ordered by Allyson Magaña MD at 09/14/21 1119          Scan on 9/14/2021 1119 by Allyson Magaña MD: EGD          Electronically signed by Allyson Magaña MD at 09/14/21 1142            Consult Notes (last 48 hours) (Notes from 09/12/21 1209 through 09/14/21 1209)      Allyson Magaña MD at 09/13/21 1302      Consult Orders    1. Inpatient  "Gastroenterology Consult [[id number]] ordered by Shyla Cordova APRN at 09/13/21 0852               [facility]  Initial Inpatient Consult Note    Referring Provider: [facility]    Reason for Consultation: GI bleed    Subjective     History of present illness:      Thank you for requesting my opinion.    This is a 56-year-old homeless and alcoholic man who came to the emergency room with reports of hematemesis.  He reports this happened yesterday.  He says he vomited about \"15 times\".  He says it was all bright red blood.  He denies any associated melena, in fact he says he has not had a bowel movement.  CT imaging demonstrates thickening of the esophagus.  He denies that he is having significant esophageal pain.  He does not think he has ever had an EGD.  He drinks excessively and has done so for a number of years.  He says this includes both beer and liquor.  He cannot really quantify how much he drinks.    He is homeless.  He says he is planning to go to our [facility] upon discharge.  He says he has had alcohol for a number of years.  He cannot really tell me if he has any family history of GI malignancies.        Past Medical History:  Past Medical History:   Diagnosis Date   • Anxiety    • Depression    • Hepatitis C    • Hx of drug abuse (CMS/HCC)        Past Surgical History:  Past Surgical History:   Procedure Laterality Date   • ANKLE SURGERY     • SPLENECTOMY          Social History:   Social History     Tobacco Use   • Smoking status: Current Every Day Smoker   • Smokeless tobacco: Never Used   Substance Use Topics   • Alcohol use: Yes     Comment: \"3 cases of beer a week & 5-6 bottles of wine. Sometimes some 5ths.\"        Family History:  History reviewed. No pertinent family history.    Home Meds:  (Not in a hospital admission)      Current Meds:   [START ON 9/14/2021] thiamine, 100 mg, Oral, Daily   And  [START ON 9/14/2021] multivitamin, 1 tablet, Oral, Daily   And  [START " ON 9/14/2021] folic acid, 1 mg, Oral, Daily        Allergies:  Allergies   Allergen Reactions   • Penicillin G Unknown - Low Severity       Review of Systems  All systems were reviewed and negative except for:  Gastrointestinal: positive for  hematemesis     Objective     Vital Signs  Temp:  [98.5 °F (36.9 °C)] 98.5 °F (36.9 °C)  Heart Rate:  [105-133] 105  Resp:  [16-18] 16  BP: (140-164)/() 140/92    Physical Exam:  Constitutional:   Drowsy, cooperative, in no acute distress, appears stated age, disheveled   Eyes:           Lids and lashes normal, conjunctivae and sclerae normal,   no icterus   Ears, nose, mouth and throat:  Normal appearance of external ears and nose, no oral l  lesions, no thrush, oral mucosa moist   Respiratory:    Clear to auscultation, respirations regular, even and             unlabored    Cardiovascular:   Regular rhythm and normal rate, normal S1 and S2, no        murmur, no gallop, palpable distal pulses, no lower extremity edema   Gastrointestinal:    Soft, nondistended, nontender to palpation, no guarding, no rebound tenderness, normal bowel sounds, no palpable masses or organomegaly  Rectal exam: deferred   Musculoskeletal:  Normal station, no atrophy, no tenderness to palpation, normal digits and nails   Skin:  Normal color, no bleeding, bruising, rashes or lesions   Lymphatics:  No palpable cervical or supraclavicular adenopathy   Psychiatric:  Judgement and insight: limited insight, poor historian   Orientation to person, place and time: normal   Mood and affect: normal       Results Review:   I reviewed the patient's new clinical results.    Results from last 7 days   Lab Units 09/13/21  0625   WBC 10*3/mm3 18.71*   HEMOGLOBIN g/dL 14.4   HEMATOCRIT % 41.1   PLATELETS 10*3/mm3 365       Results from last 7 days   Lab Units 09/13/21  0625   SODIUM mmol/L 129*   POTASSIUM mmol/L 3.7   CHLORIDE mmol/L 87*   CO2 mmol/L 29.4*   BUN mg/dL 10   CREATININE mg/dL 0.85   CALCIUM mg/dL  9.3   BILIRUBIN mg/dL 0.9   ALK PHOS U/L 100   ALT (SGPT) U/L 28   AST (SGOT) U/L 54*   GLUCOSE mg/dL 149*       Results from last 7 days   Lab Units 09/13/21  0625   INR  1.06       Lab Results   Lab Value Date/Time    LIPASE 27 09/13/2021 0625    LIPASE 94 (H) 08/26/2021 0428       Radiology:  Imaging Results (Last 72 Hours)     Procedure Component Value Units Date/Time    CT Abdomen Pelvis With Contrast [[id number]] Collected: 09/13/21 0805     Updated: 09/13/21 0805    Narrative:      CT ABDOMEN AND PELVIS WITH CONTRAST     HISTORY: Upper abdominal pain with hematemesis.     TECHNIQUE: Axial CT images of the abdomen and pelvis were obtained  following administration of intravenous contrast. The patient was not  given oral contrast Coronal and sagittal reformats were obtained.     COMPARISON: 08/26/2021     FINDINGS: There is marked low-density wall thickening within the distal  esophagus that has significantly increased in the interim from prior CT,  most concerning for esophagitis. No pneumomediastinum identified within  the visualized abdomen.     Evaluation of the upper abdomen is limited secondary to motion. The  proximal small bowel loops are mildly dilated most suggestive of mild  ileus. Otherwise, there is no evidence of bowel obstruction. The  appendix is normal. Moderate amount of formed stool is seen within the  colon. No focal hepatic lesions nor intrahepatic biliary dilatation.  There is a mildly prominent portacaval lymph node that is favored to be  reactive. The gallbladder is normal. 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 The pancreas is  normal without ductal dilatation. Bilateral adrenal glands are normal.  Both kidneys are normal in size and attenuation. No hydronephrosis. The  urinary bladder is partially distended and normal. There is no  pathological retroperitoneal lymphadenopathy. No ascites.       Impression:      There has been interval  marked progression of the distal  esophageal wall thickening that is low-density and circumferential. No  pneumomediastinum within the visualized portion. Otherwise the abdomen  and pelvis is likely unremarkable.     These findings were discussed with Dr. Schilling by telephone.     Radiation dose reduction techniques were utilized, including automated  exposure control and exposure modulation based on body size.                Assessment/Plan       Hematemesis with nausea    Esophageal thickening    History of DVT (deep vein thrombosis)    Hyponatremia    Alcohol dependence (CMS/HCC)    Leukocytosis      Impression  1.  Reports of hematemesis: Not witnessed here.  Hemoglobin is stable    2.  Thickening of the esophagus: On CT imaging, likely esophagitis    3.  Hyponatremia: Suspected multifactorial with alcohol use    4.  Elevated AST level: With alcohol use    5.  Alcohol abuse: Ongoing issue    6. Leukocytosis    Plan  Okay for clear liquids  PPI drip  Monitor for further bleeding  Follow hemoglobin, maintain above 7  We will plan for EGD tomorrow  Needs alcohol cessation    I discussed the patients findings and my recommendations with patient and Dr Coker    All necessary PPE, including face mask and eye protection, were worn during this encounter.  Hand sanitization was performed both before and after the patient interaction.    Allyson Magaña MD  [facility]      Dictated utilizing Dragon dictation      Electronically signed by Allyson Magaña MD at 09/13/21 [id number]

## 2021-09-14 NOTE — NURSING NOTE
Entered room, introduced self, turned lights on. Pt is not cooperative, pulling covers over self, not answering questions, ignoring nurse. Plan is EGD today, but consent is not signed. Spoke with michelle, who asked to inform MD. Paged MD and michelle called again about status of patient. Informed michelle MD had been paged 5 minutes ago to update. Michelle stated they will reach out to MD.

## 2021-09-14 NOTE — THERAPY EVALUATION
Patient Name: David Villalta  : 1965    MRN: [id number]                              Today's Date: 2021       Admit Date: 2021    Visit Dx:     ICD-10-CM ICD-9-CM   1. Hematemesis with nausea  K92.0 578.0     787.02   2. Esophageal thickening  K22.8 530.89   3. History of alcohol abuse  F10.11 305.03     Patient Active Problem List   Diagnosis   • Hematemesis with nausea   • Esophageal thickening   • History of DVT (deep vein thrombosis)   • Hyponatremia   • Alcohol dependence (CMS/HCC)   • Leukocytosis     Past Medical History:   Diagnosis Date   • Anxiety    • Depression    • Hepatitis C    • Hx of drug abuse (CMS/HCC)      Past Surgical History:   Procedure Laterality Date   • ANKLE SURGERY     • SPLENECTOMY       General Information     Row Name 21 1332          Physical Therapy Time and Intention    Document Type  evaluation  -DP     Mode of Treatment  individual therapy;physical therapy  -DP     Row Name 21 1332          General Information    Patient Profile Reviewed  yes  -DP     Prior Level of Function  independent:  -DP     Existing Precautions/Restrictions  no known precautions/restrictions  -DP     Barriers to Rehab  medically complex  -DP     Row Name 21 1332          Living Environment    Lives With  alone;other (see comments) pt says he lives on the streets  -DP     Row Name 21 1332          Cognition    Orientation Status (Cognition)  oriented x 4  -DP     Row Name 21 1332          Safety Issues, Functional Mobility    Comment, Safety Issues/Impairments (Mobility)  Pt had no difficulty ambulating with PT, but did become a little dizzy during the 300 ft walk.  -DP       User Key  (r) = Recorded By, (t) = Taken By, (c) = Cosigned By    Initials Name Provider Type    DP Rinku Whitehead, PT Physical Therapist        Mobility     Row Name 21 133          Bed Mobility    Bed Mobility  bed mobility (all) activities  -DP     All Activities, [place] (Bed  Mobility)  independent  -DP     Comment (Bed Mobility)  Able to complete all bed mobility independently  -DP     Row Name 09/14/21 1335          Transfers    Comment (Transfers)  Able to complete all transfers independently  -DP     Row Name 09/14/21 1335          Bed-Chair Transfer    Bed-Chair [place] (Transfers)  independent  -DP     Row Name 09/14/21 1335          Sit-Stand Transfer    Sit-Stand [place] (Transfers)  independent  -DP     Row Name 09/14/21 1335          Gait/Stairs (Locomotion)    [place] Level (Gait)  independent  -DP     Distance in Feet (Gait)  300  -DP       User Key  (r) = Recorded By, (t) = Taken By, (c) = Cosigned By    Initials Name Provider Type    DP Rinku Whitehead, SEUN Physical Therapist        Obj/Interventions     Row Name 09/14/21 1337          Range of Motion Comprehensive    General Range of Motion  no range of motion deficits identified  -DP     Row Name 09/14/21 1337          Strength Comprehensive (MMT)    General Manual Muscle Testing (MMT) Assessment  no strength deficits identified  -DP     Comment, General Manual Muscle Testing (MMT) Assessment  Assesed LE strength through functional movements and exercises against gravity.  -DP     Row Name 09/14/21 1337          Motor Skills    Therapeutic Exercise  -- AP, LAQ and seated marches 10x  -DP     Row Name 09/14/21 1337          Balance    Balance Assessment  sitting static balance;standing static balance;standing dynamic balance;sitting dynamic balance  -DP     Static Sitting Balance  WNL  -DP     Dynamic Sitting Balance  WNL  -DP     Static Standing Balance  WNL  -DP     Dynamic Standing Balance  WFL  -DP     Row Name 09/14/21 1337          Sensory Assessment (Somatosensory)    Sensory Assessment (Somatosensory)  not tested  -DP       User Key  (r) = Recorded By, (t) = Taken By, (c) = Cosigned By    Initials Name Provider Type    Rinku Diaz, SEUN Physical Therapist        Goals/Plan    No documentation.        Clinical Impression     Row Name 09/14/21 1338          Plan of Care Review    Plan of Care Reviewed With  patient  -DP     Progress  no change  -DP     Outcome Summary  Pt was on the phone in supine position upon PT entering the room. The Pt performed all bed mobility and transfers independently. The pt was able to ambulate 300 ft independently but became dizzy during the walk but was minimal and did not hinder his ability to walk. Pt is independent and does not require skilled PT services at this time.  -DP     Row Name 09/14/21 1330          Therapy Assessment/Plan (PT)    Rehab Potential (PT)  other (see comments) does not require PT  -DP     Criteria for Skilled Interventions Met (PT)  no problems identified which require skilled intervention  -DP     Row Name 09/14/21 5485          Positioning and Restraints    Pre-Treatment Position  in bed  -DP     Post Treatment Position  bed  -DP     In Bed  supine  -DP       User Key  (r) = Recorded By, (t) = Taken By, (c) = Cosigned By    Initials Name Provider Type    Rinku Diaz PT Physical Therapist        Outcome Measures     Row Name 09/14/21 3116          How much help from another person do you currently need...    Turning from your back to your side while in flat bed without using bedrails?  4  -DP     Moving from lying on back to sitting on the side of a flat bed without bedrails?  4  -DP     Moving to and from a bed to a chair (including a wheelchair)?  4  -DP     Standing up from a chair using your arms (e.g., wheelchair, bedside chair)?  4  -DP     Climbing 3-5 steps with a railing?  4  -DP     To walk in hospital room?  4  -DP     AM-PAC 6 Clicks Score (PT)  24  -DP     Row Name 09/14/21 1040          Functional Assessment    Outcome Measure Options  AM-PAC 6 Clicks Basic Mobility (PT)  -DP       User Key  (r) = Recorded By, (t) = Taken By, (c) = Cosigned By    Initials Name Provider Type    Rinku Diaz PT Physical Therapist                        Physical Therapy Education                 Title: PT OT SLP Therapies (In Progress)     Topic: Physical Therapy (In Progress)     Point: Mobility training (Done)     Learning Progress Summary           Patient Acceptance, E,D, SALMA,DU by DP at 9/14/2021 1348                   Point: Home exercise program (Not Started)     Learner Progress:  Not documented in this visit.          Point: Body mechanics (Not Started)     Learner Progress:  Not documented in this visit.          Point: Precautions (Not Started)     Learner Progress:  Not documented in this visit.                      User Key     Initials Effective Dates Name Provider Type Discipline    DP 08/24/21 -  Rinku Whitehead PT Physical Therapist PT              PT Recommendation and Plan     Plan of Care Reviewed With: patient  Progress: no change  Outcome Summary: Pt was on the phone in supine position upon PT entering the room. The Pt performed all bed mobility and transfers independently. The pt was able to ambulate 300 ft independently but became dizzy during the walk but was minimal and did not hinder his ability to walk. Pt is independent and does not require skilled PT services at this time.     Time Calculation:   PT Charges     Row Name 09/14/21 1352             Time Calculation    Start Time  1310  -DP      Stop Time  1330  -DP      Time Calculation (min)  20 min  -DP      PT Received On  09/14/21  -DP         Timed Charges    41818 - PT Therapeutic Activity Minutes  8  -DP         Untimed Charges    PT Eval/Re-eval Minutes  12  -DP         Total Minutes    Timed Charges Total Minutes  8  -DP      Untimed Charges Total Minutes  12  -DP       Total Minutes  20  -DP        User Key  (r) = Recorded By, (t) = Taken By, (c) = Cosigned By    Initials Name Provider Type    DP Rinku Whitehead PT Physical Therapist        Therapy Charges for Today     Code Description Service Date Service Provider Modifiers Qty    86736226637  PT THERAPEUTIC ACT EA 15  MIN 9/14/2021 Rinku Whitehead, PT GP 1    11514385022 HC PT EVAL MOD COMPLEXITY 2 9/14/2021 Rinku Whitehead, PT GP 1          PT G-Codes  Outcome Measure Options: AM-PAC 6 Clicks Basic Mobility (PT)  AM-PAC 6 Clicks Score (PT): 24    Rinku Whitehead PT  9/14/2021

## 2021-09-14 NOTE — NURSING NOTE
Pt laying in bed with eyes closed. He opened them to talk for a minute. He reports 0 depression, 0 anxiety, no thoughts of hurting himself, and just wants food. Pt closed his eyes while talking and was short in his responses. Access will continue to follow and s/w the RN about food.

## 2021-09-14 NOTE — ANESTHESIA PREPROCEDURE EVALUATION
Anesthesia Evaluation     no history of anesthetic complications:               Airway   Mallampati: II  TM distance: >3 FB  Neck ROM: full  Dental    (+) poor dentition    Comment: Missing upper front teeth , others loose and decayed    Pulmonary    (+) a smoker Current,   (-) shortness of breath, recent URI  Cardiovascular     (-) hypertension, dysrhythmias, angina, MUNOZ, hyperlipidemia      Neuro/Psych  (+) psychiatric history Depression and Anxiety,     (-) seizures, syncope    ROS Comment: On Ativan for withdrawal prophylaxis. Slightly drowsy, but able to answer questions.  GI/Hepatic/Renal/Endo    (+)  GI bleeding , hepatitis C, liver disease history of elevated LFT,     Musculoskeletal     Abdominal    Substance History   (+) alcohol use, drug use     OB/GYN          Other                        Anesthesia Plan    ASA 3 - emergent     MAC     intravenous induction     Anesthetic plan, all risks, benefits, and alternatives have been provided, discussed and informed consent has been obtained with: patient.

## 2021-09-14 NOTE — PLAN OF CARE
Goal Outcome Evaluation:  Plan of Care Reviewed With: patient        Progress: no change  Outcome Summary: Pt was on the phone in supine position upon PT entering the room. The Pt performed all bed mobility and transfers independently. The pt was able to ambulate 300 ft independently but became dizzy during the walk but was minimal and did not hinder his ability to walk. Pt is independent and does not require skilled PT services at this time.    Patient was wearing a face mask during this therapy encounter. Therapist used appropriate personal protective equipment including eye protection, mask, and gloves.  Mask used was standard procedure mask. Appropriate PPE was worn during the entire therapy session. Hand hygiene was completed before and after therapy session. Patient is not in enhanced droplet precautions.

## 2021-09-14 NOTE — PROGRESS NOTES
Name: David Villalta ADMIT: 2021   : 1965  PCP: Provider, No Known    MRN: [id number] LOS: 1 days   AGE/SEX: 56 y.o. male  ROOM: ENDO/ENDO     Subjective   Subjective   Not interactive when seen this morning. Would not open eyes or talk. Was apparently impulsive overnight, refused some care, and was paranoid and hallucinating. Has been receiving lorazepam per [facility] protocol; last at 0420 when seen. Pt must have became more alert as he has undergone EGD    Review of Systems   Unable to perform ROS: Other        Objective   Objective   Vital Signs  Temp:  [98.2 °F (36.8 °C)-99.6 °F (37.6 °C)] 98.4 °F (36.9 °C)  Heart Rate:  [64-99] 65  Resp:  [14-20] 16  BP: ()/(48-97) 88/51  SpO2:  [90 %-99 %] 93 %  on  Flow (L/min):  [10] 10;   Device (Oxygen Therapy): room air  Body mass index is 26.53 kg/m².  Physical Exam  Vitals and nursing note reviewed.   Constitutional:       General: He is not in acute distress.  HENT:      Head: Normocephalic.      Mouth/Throat:      Mouth: Mucous membranes are dry.   Eyes:      General: Lids are normal.   Cardiovascular:      Rate and Rhythm: Normal rate and regular rhythm.   Pulmonary:      Effort: Pulmonary effort is normal. No respiratory distress.   Abdominal:      Palpations: Abdomen is soft.   Musculoskeletal:      Cervical back: Neck supple.      Right lower leg: No edema.      Left lower leg: No edema.   Skin:     General: Skin is warm and dry.   Neurological:      Comments: Purposeful movements all extremities   Psychiatric:         Speech: He is noncommunicative.         Results Review     I reviewed the patient's new clinical results.  Results from last 7 days   Lab Units 21  0955 21  1909 21  0625   WBC 10*3/mm3  --  17.87*  --  18.71*   HEMOGLOBIN g/dL 11.9* 11.7*  11.7* 12.4* 14.4   PLATELETS 10*3/mm3  --  298  --  365     Results from last 7 days   Lab Units 21  0625   SODIUM mmol/L 137 129*    POTASSIUM mmol/L 3.8 3.7   CHLORIDE mmol/L 102 87*   CO2 mmol/L 26.4 29.4*   BUN mg/dL 6 10   CREATININE mg/dL 0.82 0.85   GLUCOSE mg/dL 94 149*   Estimated Creatinine Clearance: 122.2 mL/min (by C-G formula based on SCr of 0.82 mg/dL).  Results from last 7 days   Lab Units 09/13/21  2358 09/13/21  0625   ALBUMIN g/dL 3.50 4.50   BILIRUBIN mg/dL 0.8 0.9   ALK PHOS U/L 79 100   AST (SGOT) U/L 42* 54*   ALT (SGPT) U/L 21 28     Results from last 7 days   Lab Units 09/13/21  2358 09/13/21  0625   CALCIUM mg/dL 8.3* 9.3   ALBUMIN g/dL 3.50 4.50   MAGNESIUM mg/dL  --  2.0       COVID19   Date Value Ref Range Status   09/13/2021 Not Detected Not Detected - Ref. Range Final     No results found for: HGBA1C, POCGLU    CT Abdomen Pelvis With Contrast  Narrative: CT ABDOMEN AND PELVIS WITH CONTRAST     HISTORY: Upper abdominal pain with hematemesis.     TECHNIQUE: Axial CT images of the abdomen and pelvis were obtained  following administration of intravenous contrast. The patient was not  given oral contrast Coronal and sagittal reformats were obtained.     COMPARISON: 08/26/2021     FINDINGS: There is marked low-density wall thickening within the distal  esophagus that has significantly increased in the interim from prior CT,  most concerning for esophagitis. No pneumomediastinum identified within  the visualized abdomen.     Evaluation of the upper abdomen is limited secondary to motion. The  proximal small bowel loops are mildly dilated most suggestive of mild  ileus. Otherwise, there is no evidence of bowel obstruction. The  appendix is normal. Moderate amount of formed stool is seen within the  colon. No focal hepatic lesions nor intrahepatic biliary dilatation.  There is a mildly prominent portacaval lymph node that is favored to be  reactive. The gallbladder is normal. 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 The pancreas is  normal without ductal dilatation. Bilateral  adrenal glands are normal.  Both kidneys are normal in size and attenuation. No hydronephrosis. The  urinary bladder is partially distended and normal. There is no  pathological retroperitoneal lymphadenopathy. No ascites.     Impression: There has been interval marked progression of the distal  esophageal wall thickening that is low-density and circumferential. No  pneumomediastinum within the visualized portion. Otherwise the abdomen  and pelvis is likely unremarkable.     These findings were discussed with Dr. Schilling by telephone.     Radiation dose reduction techniques were utilized, including automated  exposure control and exposure modulation based on body size.          Scheduled Medications  thiamine, 100 mg, Oral, Daily   And  multivitamin, 1 tablet, Oral, Daily   And  folic acid, 1 mg, Oral, Daily  multivitamin with minerals, 1 tablet, Oral, Daily  pantoprazole, 40 mg, Oral, BID AC  QUEtiapine, 300 mg, Oral, Nightly  sucralfate, 1 g, Oral, TID AC    Infusions  pantoprazole, 8 mg/hr, Last Rate: 8 mg/hr (09/14/21 0624)  sodium chloride, 125 mL/hr, Last Rate: 125 mL/hr (09/14/21 0510)  sodium chloride, 30 mL/hr, Last Rate: 30 mL/hr (09/14/21 1105)    Diet  Diet Regular; GI Soft       Assessment/Plan     Active Hospital Problems    Diagnosis  POA   • **Hematemesis with nausea [K92.0]  Yes   • Esophageal thickening [K22.8]  Yes   • History of DVT (deep vein thrombosis) [Z86.718]  Not Applicable   • Hyponatremia [E87.1]  Yes   • Alcohol dependence (CMS/HCC) [F10.20]  Yes   • Leukocytosis [D72.829]  Yes      Resolved Hospital Problems   No resolved problems to display.       56 y.o. male admitted with Hematemesis with nausea.    -Appreciate GI assistance. S/P EGD w/findings of Grade D esophagitis, gastritis and duodenitis. BID PPI, Carafate. Follow up biopsies. Soft diet. Will need repeat EGD in 12 weeks. Hgb remains stable  -Psychiatry consult for paranoia/hallucinations. Access has seen. Continue seroquel and  RICHARD protocol for now  -Trend WBC. Afebrile  -Na improved  -Hx DVT: Pt stopped taking Eliquis ~ 1 week prior to admission for unclear reasons; ?difficulty getting meds as he is currently homeless. Will ask CCP to see      · SCDs for DVT prophylaxis.  · Full code.  · Discussed with nursing staff, consulting provider and Dr. Knowles.  · Anticipate discharge TBD       KAREL Glass  [facility]  09/14/21  12:01 EDT

## 2021-09-14 NOTE — NURSING NOTE
IP ARNP and GI MD in room with nurse. Pt continues to sleep. Last dose of ativan was at 0400.  Per GI MD, plan is to keep NPO til noon. If pt becomes more awake, will perform EGD. If not, cancel scope and plan for tomorrow.

## 2021-09-14 NOTE — PLAN OF CARE
Goal Outcome Evaluation:     56 y.o. male admitted with Hematemesis with nausea. EGD performed today and showed esophagitis, gastritis and duodenitis. Pt was on the phone in supine position upon PT entering the room. The Pt performed all bed mobility and transfers independently. The pt was able to ambulate 300 ft independently but became dizzy during the walk but was minimal and did not hinder his ability to walk. Pt is independent and does not require skilled PT services at this time. Access nurse evaluated patient. He reports 0 depression, 0 anxiety, no thoughts of hurting himself, and just wants food. Pt closed his eyes while talking and was short in his responses.

## 2021-09-15 VITALS
SYSTOLIC BLOOD PRESSURE: 144 MMHG | RESPIRATION RATE: 18 BRPM | DIASTOLIC BLOOD PRESSURE: 100 MMHG | WEIGHT: 182.5 LBS | OXYGEN SATURATION: 92 % | TEMPERATURE: 98.2 F | HEART RATE: 72 BPM | HEIGHT: 70 IN | BODY MASS INDEX: 26.13 KG/M2

## 2021-09-15 PROBLEM — K22.89 ESOPHAGEAL THICKENING: Status: RESOLVED | Noted: 2021-09-13 | Resolved: 2021-09-15

## 2021-09-15 PROBLEM — E87.1 HYPONATREMIA: Status: RESOLVED | Noted: 2021-09-13 | Resolved: 2021-09-15

## 2021-09-15 PROBLEM — K29.90 GASTRITIS AND DUODENITIS: Status: ACTIVE | Noted: 2021-09-15

## 2021-09-15 PROBLEM — K20.90 ESOPHAGITIS: Status: ACTIVE | Noted: 2021-09-15

## 2021-09-15 PROBLEM — K92.0 HEMATEMESIS WITH NAUSEA: Status: RESOLVED | Noted: 2021-09-13 | Resolved: 2021-09-15

## 2021-09-15 PROBLEM — D72.829 LEUKOCYTOSIS: Status: RESOLVED | Noted: 2021-09-13 | Resolved: 2021-09-15

## 2021-09-15 LAB
ALBUMIN SERPL-MCNC: 3.1 G/DL (ref 3.5–5.2)
ALBUMIN/GLOB SERPL: 1.2 G/DL
ALP SERPL-CCNC: 71 U/L (ref 39–117)
ALT SERPL W P-5'-P-CCNC: 23 U/L (ref 1–41)
ANION GAP SERPL CALCULATED.3IONS-SCNC: 8.5 MMOL/L (ref 5–15)
AST SERPL-CCNC: 37 U/L (ref 1–40)
BILIRUB SERPL-MCNC: 0.3 MG/DL (ref 0–1.2)
BUN SERPL-MCNC: 10 MG/DL (ref 6–20)
BUN/CREAT SERPL: 10.8 (ref 7–25)
CALCIUM SPEC-SCNC: 8.2 MG/DL (ref 8.6–10.5)
CHLORIDE SERPL-SCNC: 107 MMOL/L (ref 98–107)
CO2 SERPL-SCNC: 23.5 MMOL/L (ref 22–29)
CREAT SERPL-MCNC: 0.93 MG/DL (ref 0.76–1.27)
DEPRECATED RDW RBC AUTO: 48.4 FL (ref 37–54)
ERYTHROCYTE [DISTWIDTH] IN BLOOD BY AUTOMATED COUNT: 13.9 % (ref 12.3–15.4)
GFR SERPL CREATININE-BSD FRML MDRD: 85 ML/MIN/1.73
GLOBULIN UR ELPH-MCNC: 2.5 GM/DL
GLUCOSE SERPL-MCNC: 102 MG/DL (ref 65–99)
HCT VFR BLD AUTO: 37.4 % (ref 37.5–51)
HCT VFR BLD AUTO: 37.4 % (ref 37.5–51)
HGB BLD-MCNC: 11.7 G/DL (ref 13–17.7)
HGB BLD-MCNC: 11.7 G/DL (ref 13–17.7)
LAB AP CASE REPORT: NORMAL
MCH RBC QN AUTO: 29.5 PG (ref 26.6–33)
MCHC RBC AUTO-ENTMCNC: 31.3 G/DL (ref 31.5–35.7)
MCV RBC AUTO: 94.2 FL (ref 79–97)
PATH REPORT.FINAL DX SPEC: NORMAL
PATH REPORT.GROSS SPEC: NORMAL
PLATELET # BLD AUTO: 292 10*3/MM3 (ref 140–450)
PMV BLD AUTO: 9.6 FL (ref 6–12)
POTASSIUM SERPL-SCNC: 4.1 MMOL/L (ref 3.5–5.2)
PROT SERPL-MCNC: 5.6 G/DL (ref 6–8.5)
RBC # BLD AUTO: 3.97 10*6/MM3 (ref 4.14–5.8)
SODIUM SERPL-SCNC: 139 MMOL/L (ref 136–145)
WBC # BLD AUTO: 10.43 10*3/MM3 (ref 3.4–10.8)

## 2021-09-15 PROCEDURE — 85018 HEMOGLOBIN: CPT | Performed by: INTERNAL MEDICINE

## 2021-09-15 PROCEDURE — 25010000002 LORAZEPAM PER 2 MG: Performed by: INTERNAL MEDICINE

## 2021-09-15 PROCEDURE — 99232 SBSQ HOSP IP/OBS MODERATE 35: CPT | Performed by: NURSE PRACTITIONER

## 2021-09-15 PROCEDURE — 85027 COMPLETE CBC AUTOMATED: CPT | Performed by: INTERNAL MEDICINE

## 2021-09-15 PROCEDURE — 85014 HEMATOCRIT: CPT | Performed by: INTERNAL MEDICINE

## 2021-09-15 PROCEDURE — 80053 COMPREHEN METABOLIC PANEL: CPT | Performed by: INTERNAL MEDICINE

## 2021-09-15 RX ORDER — FOLIC ACID 1 MG/1
1 TABLET ORAL DAILY
Qty: 1 TABLET | Refills: 0 | Status: SHIPPED | OUTPATIENT
Start: 2021-09-16 | End: 2021-09-17

## 2021-09-15 RX ORDER — QUETIAPINE FUMARATE 300 MG/1
300 TABLET, FILM COATED ORAL NIGHTLY
Qty: 30 TABLET | Refills: 0 | Status: SHIPPED | OUTPATIENT
Start: 2021-09-15 | End: 2022-10-27 | Stop reason: HOSPADM

## 2021-09-15 RX ORDER — SUCRALFATE 1 G/1
1 TABLET ORAL
Qty: 90 TABLET | Refills: 0 | Status: SHIPPED | OUTPATIENT
Start: 2021-09-15 | End: 2021-11-25 | Stop reason: SDUPTHER

## 2021-09-15 RX ORDER — MULTIPLE VITAMINS W/ MINERALS TAB 9MG-400MCG
1 TAB ORAL DAILY
Qty: 30 EACH | Refills: 0 | Status: SHIPPED | OUTPATIENT
Start: 2021-09-15

## 2021-09-15 RX ORDER — PANTOPRAZOLE SODIUM 40 MG/1
40 TABLET, DELAYED RELEASE ORAL
Qty: 60 TABLET | Refills: 0 | Status: SHIPPED | OUTPATIENT
Start: 2021-09-15 | End: 2021-11-25 | Stop reason: SDUPTHER

## 2021-09-15 RX ADMIN — FOLIC ACID 1 MG: 1 TABLET ORAL at 08:24

## 2021-09-15 RX ADMIN — LORAZEPAM 1 MG: 2 INJECTION INTRAMUSCULAR; INTRAVENOUS at 01:10

## 2021-09-15 RX ADMIN — SUCRALFATE 1 G: 1 TABLET ORAL at 08:24

## 2021-09-15 RX ADMIN — PANTOPRAZOLE SODIUM 40 MG: 40 TABLET, DELAYED RELEASE ORAL at 08:23

## 2021-09-15 RX ADMIN — Medication 1 TABLET: at 08:24

## 2021-09-15 RX ADMIN — MULTIPLE VITAMINS W/ MINERALS TAB 1 TABLET: TAB at 08:25

## 2021-09-15 RX ADMIN — Medication 100 MG: at 08:24

## 2021-09-15 NOTE — CONSULTS
IDENTIFYING INFORMATION: The patient is a 56-year-old white male well-known to this physician from multiple admissions to our [facility].  He was admitted with esophagitis related to his recent relapse of alcohol use.    CHIEF COMPLAINT: None given    INFORMANT: Patient and chart    RELIABILITY: Good    HISTORY OF PRESENT ILLNESS: The patient is a 56-year-old white male well-known to this physician from multiple previous admissions at our [facility].  While this physician does not care for the patient he is become familiar with him.  The patient is generally well behaved and compliant with care.  He suffers from a schizophrenic illness complicated by his alcohol abuse.  The patient is prescribed Seroquel which she states is helpful in addressing his symptoms.  The patient reports that he recently lost his apartment and began drinking again.  He denies current suicidal or homicidal ideation the denies any current psychotic symptoms.  He is pleasant and cooperative during today's interview.    PAST PSYCHIATRIC HISTORY: The patient is chronically ill and his illness is complicated by his alcohol abuse    PAST MEDICAL HISTORY: Significant for history of DVT, hyponatremia    MEDICATIONS:   Current Facility-Administered Medications   Medication Dose Route Frequency Provider Last Rate Last Admin   • acetaminophen (TYLENOL) tablet 650 mg  650 mg Oral Q4H PRN Allyson Magaña MD        Or   • acetaminophen (TYLENOL) 160 MG/5ML solution 650 mg  650 mg Oral Q4H PRN Allyson Magaña MD        Or   • acetaminophen (TYLENOL) suppository 650 mg  650 mg Rectal Q4H PRN Allyson Magaña MD       • thiamine (VITAMIN B-1) tablet 100 mg  100 mg Oral Daily Dk Coker MD   100 mg at 09/15/21 0824    And   • multivitamin (THERAGRAN) tablet 1 tablet  1 tablet Oral Daily Dk Coker MD   1 tablet at 09/15/21 0824    And   • folic acid (FOLVITE) tablet 1 mg  1 mg Oral Daily Dk Coker MD   1 mg at 09/15/21 0824    • LORazepam (ATIVAN) tablet 1 mg  1 mg Oral Q2H PRN Allyson Magaña MD   1 mg at 09/14/21 2011    Or   • LORazepam (ATIVAN) injection 1 mg  1 mg Intravenous Q2H PRN Allyson Magaña MD   1 mg at 09/15/21 0110    Or   • LORazepam (ATIVAN) tablet 2 mg  2 mg Oral Q1H PRN Allyson Magaña MD        Or   • LORazepam (ATIVAN) injection 2 mg  2 mg Intravenous Q1H PRN Allyson Magaña MD   2 mg at 09/14/21 1514    Or   • LORazepam (ATIVAN) injection 2 mg  2 mg Intravenous Q15 Min PRN Allyson Magaña MD   2 mg at 09/13/21 1216    Or   • LORazepam (ATIVAN) injection 2 mg  2 mg Intramuscular Q15 Min PRN Allyson Magaña MD       • multivitamin with minerals 1 tablet  1 tablet Oral Daily Allyson Magaña MD   1 tablet at 09/15/21 0825   • nitroglycerin (NITROSTAT) SL tablet 0.4 mg  0.4 mg Sublingual Q5 Min PRN Allyson Magaña MD       • ondansetron (ZOFRAN) tablet 4 mg  4 mg Oral Q6H PRN Allyson Magaña MD        Or   • ondansetron (ZOFRAN) injection 4 mg  4 mg Intravenous Q6H PRN Allyson Magaña MD   4 mg at 09/14/21 2012   • pantoprazole (PROTONIX) EC tablet 40 mg  40 mg Oral BID AC Allyson Magaña MD   40 mg at 09/15/21 0823   • QUEtiapine (SEROquel) tablet 300 mg  300 mg Oral Nightly Allyson Magaña MD   300 mg at 09/14/21 2011   • sodium chloride 0.9 % flush 10 mL  10 mL Intravenous PRN Allyson Magaña MD       • sodium chloride 0.9 % infusion  30 mL/hr Intravenous Continuous PRN Allyson Magaña MD   Stopped at 09/14/21 1200   • sucralfate (CARAFATE) tablet 1 g  1 g Oral TID AC Allyson Magaña MD   1 g at 09/15/21 0824         ALLERGIES: Penicillin    FAMILY HISTORY: Noncontributory    SOCIAL HISTORY: Alcohol use as noted previously    MENTAL STATUS EXAM: Patient is well-developed well-nourished white male appearing stated age.  He is no apparent physical distress at times admission.  He is awake alert and oriented all spheres.  His mood is euthymic his affect congruent.  Speech is relevant and coherent.  There  are no deficits memory or cognition noted.  Intelligence is judged to be in the average range based on fund of knowledge, the patient is cooperative actively.  He denies current suicidal homicidal ideations psychotic features.  His judgment insight appear to be at baseline.  No signs of withdrawal are noted.    ASSETS/LIABILITIES: To be assessed    DIAGNOSTIC IMPRESSION: Chronic schizophrenia, alcohol use disorder, esophagitis, history of DVT, history of hyponatremia    PLAN: I would recommend continuation of the patient's current detoxication protocol and Seroquel.  I will continue to follow with you.  At this point he does not appear to be in need of psychiatric hospitalization but this could change as his hospital stay progresses.

## 2021-09-15 NOTE — NURSING NOTE
Pt discharged by [facility]. Spoke with CCP regarding pt situation. CCP states  to come and present pt with a cab voucher.

## 2021-09-15 NOTE — NURSING NOTE
brought to nurse cab voucher for patient upon discharge. Awaiting delivery of medication by meds to bed, and then will discharge.

## 2021-09-15 NOTE — CASE MANAGEMENT/SOCIAL WORK
Discharge Planning Assessment  [facility]     Patient Name: David Villalta  MRN: [id number]  Today's Date: 9/15/2021    Admit Date: 9/13/2021    Discharge Needs Assessment     Row Name 09/15/21 1139       Living Environment    Lives With  alone    Current Living Arrangements  homeless    Primary Care Provided by  self    Provides Primary Care For  no one, unable/limited ability to care for self    Family Caregiver if Needed  none    Quality of Family Relationships  unable to assess    Able to Return to Prior Arrangements  yes       Resource/Environmental Concerns    Resource/Environmental Concerns  environmental    Environment Concerns  permanent residence, none    Transportation Concerns  car, none       Transition Planning    Transportation Anticipated  other (see comments) Cab       Discharge Needs Assessment    Equipment Currently Used at Home  none    Concerns to be Addressed  discharge planning    Equipment Needed After Discharge  none    Current Discharge Risk  physical impairment        Discharge Plan     Row Name 09/15/21 1137       Plan    Plan  The patient wants to go to [facility] to be evaluated.    Provided Post Acute Provider List?  N/A    N/A Provider List Comment  Denies HH/SNF needs.    Provided Post Acute Provider Quality & Resource List?  N/A    N/A Quality & Resource List Comment  Denies HH/SNF needs.    Patient/Family in Agreement with Plan  yes    Plan Comments  Spoke to the patient, explained role of CCP, verified facesheet and discussed discharge planning needs. The patient states that he is homeless and is agreeable to various [facility] referrals to be made that may be of assistance to him.  The patient states that he does not have a cell phone currently but Xiang’s number can be left as the contact number for service providers. The patient provided CCP with permission to speak to Xiang Prakash [phone] who is listed as his guardian and the patient states that it is his  also.  Spoke to  Xiang Prakash [phone] who states that he is agreeable to service providers contacting him regarding the patient and states that he will email CCP his guardianship paperwork for the patient. He was informed that [facility] will email him some additional resources for PCP and family care homes.  The patient is requesting a cab voucher to go to [facility] as he wants them to evaluate him.  The patient states that if he cannot get admitted to [facility] he will go to [facility].  The patient states that he does not want CCP to make referrals to any homeless shelters for him as he knows the location of all of the local homeless shelters and will go to them if he feels that he needs to go to one. The patient was provided with information on how to access a PCP and he was provided with information on family care homes.  The patient’s new medication was delivered by [facility] and the cost of his Protonix was covered by [facility].   MARIELA Gordon        Continued Care and Services - Admitted Since 9/13/2021    Coordination has not been started for this encounter.       Expected Discharge Date and Time     Expected Discharge Date Expected Discharge Time    Sep 15, 2021         Demographic Summary     Row Name 09/15/21 1138       General Information    Admission Type  inpatient    Referral Source  admission list    Reason for Consult  discharge planning    Preferred Language  English     Used During This Interaction  no       Contact Information    Permission Granted to Share Info With  guardian        Functional Status     Row Name 09/15/21 1138       Functional Status    Usual Activity Tolerance  good    Current Activity Tolerance  moderate       Functional Status, IADL    Medications  independent    Meal Preparation  independent    Housekeeping  independent    Laundry  independent    Shopping  independent       Mental Status Summary    Recent Changes in Mental Status/Cognitive Functioning  no changes        Psychosocial    No  documentation.       Abuse/Neglect    No documentation.       Legal    No documentation.       Substance Abuse    No documentation.       Patient Forms    No documentation.           MARIELA Benavidez

## 2021-09-15 NOTE — DISCHARGE SUMMARY
Patient Name: David Villalta  : 1965  MRN: [id number]    Date of Admission: 2021  Date of Discharge:  9/15/2021  Primary Care Physician: Provider, No Known      Chief Complaint:   Vomiting Blood and Abdominal Pain      Discharge Diagnoses     Active Hospital Problems    Diagnosis  POA   • **Esophagitis [K20.90]  Yes   • Gastritis and duodenitis [K29.90]  Yes   • History of DVT (deep vein thrombosis) [Z86.718]  Not Applicable   • Alcohol dependence (CMS/HCC) [F10.20]  Yes      Resolved Hospital Problems    Diagnosis Date Resolved POA   • Hematemesis with nausea [K92.0] 09/15/2021 Yes   • Esophageal thickening [K22.8] 09/15/2021 Yes   • Hyponatremia [E87.1] 09/15/2021 Yes   • Leukocytosis [D72.829] 09/15/2021 Yes        Hospital Course     Mr. Villalta is a 56 y.o. male with a history of schizophrenia, alcohol dependence, hepatitis C, hx dvt who presented to [facility] initially complaining of abdominal pain with n/v.  Please see the admitting history and physical for further details.  He was found to have thickening of distal esophagus on CT and reported coffee ground emesis and was admitted to the hospital for further evaluation and treatment.  He was started on CIWA protocol due to alcohol abuse. GI was consulted. He underwent EGD on 21 that showed Grade D esophagitis, gastritis & duodenitis. Biopsies were collected. He has been started on BID PPI and carafate. He will need repeat EGD in ~ 12 weeks and follow up on biopsies. Hgb has remained stable; no indication for PRBCs. No further n/v. Access and Psychiatry have evaluated. He has continued on seroquel for schizophrenia. He has reported intermittent episodes of paranoia and hallucinations with some refusal of care but has returned close to baseline. He is interested in evaluation at [facility] at discharge. He had stopped taking Eliquis ~ 1 week prior to admission due to running out of medications. New prescriptions for medications will  be given. CCP has given appropriate resources for discharge.  Day of Discharge     Subjective:  Up in room. Tolerating po. Wants to be evaluated at [facility] at discharge. No medical complaints.    Physical Exam:  Temp:  [98.2 °F (36.8 °C)-98.3 °F (36.8 °C)] 98.2 °F (36.8 °C)  Heart Rate:  [71-86] 72  Resp:  [16-18] 18  BP: ()/() 144/100  Body mass index is 26.19 kg/m².  Physical Exam  Vitals and nursing note reviewed.   Constitutional:       General: He is not in acute distress.  HENT:      Head: Normocephalic.      Mouth/Throat:      Mouth: Mucous membranes are moist.   Eyes:      Conjunctiva/sclera: Conjunctivae normal.   Cardiovascular:      Rate and Rhythm: Normal rate and regular rhythm.   Pulmonary:      Effort: Pulmonary effort is normal. No respiratory distress.      Breath sounds: Normal breath sounds.   Abdominal:      General: Bowel sounds are normal.      Palpations: Abdomen is soft.   Musculoskeletal:      Cervical back: Neck supple.      Right lower leg: No edema.      Left lower leg: No edema.   Skin:     General: Skin is warm and dry.   Neurological:      Mental Status: He is alert. Mental status is at baseline.   Psychiatric:         Mood and Affect: Mood normal.         Behavior: Behavior normal.         Consultants     Consult Orders (all) (From admission, onward)     Start     Ordered    09/14/21 1214  Inpatient Case Management  Consult  Once     Provider:  (Not yet assigned)    09/14/21 1214    09/14/21 0943  Inpatient Psychiatrist Consult  Once     Specialty:  Psychiatry  Provider:  Jass Rg III, MD    09/14/21 0942    09/13/21 1634  Inpatient Case Management  Consult  Once,   Status:  Canceled     Provider:  (Not yet assigned)    09/13/21 1633    09/13/21 1104  Inpatient Access Center Consult  Once     Comments: Alcohol dependence and schizophrenia w/recent paranoia   Provider:  (Not yet assigned)    09/13/21 1103    09/13/21 0837   Inpatient Gastroenterology Consult  Once     Specialty:  Gastroenterology  Provider:  Allyson Magaña MD    09/13/21 0852    09/13/21 0809  [facility] (on-call MD unless specified) Details  Once     Specialty:  Hospitalist  Provider:  (Not yet assigned)    09/13/21 0808              Procedures     ESOPHAGOGASTRODUODENOSCOPY WITH COLD BIOPSIES      Imaging Results (All)     Procedure Component Value Units Date/Time    CT Abdomen Pelvis With Contrast [[id number]] Collected: 09/13/21 0805     Updated: 09/14/21 1422    Narrative:      CT ABDOMEN AND PELVIS WITH CONTRAST     HISTORY: Upper abdominal pain with hematemesis.     TECHNIQUE: Axial CT images of the abdomen and pelvis were obtained  following administration of intravenous contrast. The patient was not  given oral contrast Coronal and sagittal reformats were obtained.     COMPARISON: 08/26/2021     FINDINGS: There is marked low-density wall thickening within the distal  esophagus that has significantly increased in the interim from prior CT,  most concerning for esophagitis. No pneumomediastinum identified within  the visualized abdomen.     Evaluation of the upper abdomen is limited secondary to motion. The  proximal small bowel loops are mildly dilated most suggestive of mild  ileus. Otherwise, there is no evidence of bowel obstruction. The  appendix is normal. Moderate amount of formed stool is seen within the  colon. No focal hepatic lesions nor intrahepatic biliary dilatation.  There is a mildly prominent portacaval lymph node that is favored to be  reactive. The gallbladder is normal. 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 The pancreas is  normal without ductal dilatation. Bilateral adrenal glands are normal.  Both kidneys are normal in size and attenuation. No hydronephrosis. The  urinary bladder is partially distended and normal. There is no  pathological retroperitoneal lymphadenopathy. No ascites.        Impression:      There has been interval marked progression of the distal  esophageal wall thickening that is low-density and circumferential. No  pneumomediastinum within the visualized portion. Otherwise the abdomen  and pelvis is likely unremarkable.     These findings were discussed with Dr. Schilling by telephone.     Radiation dose reduction techniques were utilized, including automated  exposure control and exposure modulation based on body size.     This report was finalized on 9/14/2021 2:19 PM by Dr. Vito Gaxiola M.D.               Pertinent Labs     Results from last 7 days   Lab Units 09/15/21  0011 09/14/21  0955 09/13/21 2358 09/13/21  1909 09/13/21  0625 09/13/21  0625   WBC 10*3/mm3 10.43  --  17.87*  --   --  18.71*   HEMOGLOBIN g/dL 11.7*  11.7* 11.9* 11.7*  11.7* 12.4*   < > 14.4   PLATELETS 10*3/mm3 292  --  298  --   --  365    < > = values in this interval not displayed.     Results from last 7 days   Lab Units 09/15/21  0011 09/13/21  2358 09/13/21  0625   SODIUM mmol/L 139 137 129*   POTASSIUM mmol/L 4.1 3.8 3.7   CHLORIDE mmol/L 107 102 87*   CO2 mmol/L 23.5 26.4 29.4*   BUN mg/dL 10 6 10   CREATININE mg/dL 0.93 0.82 0.85   GLUCOSE mg/dL 102* 94 149*   Estimated Creatinine Clearance: 106.3 mL/min (by C-G formula based on SCr of 0.93 mg/dL).  Results from last 7 days   Lab Units 09/15/21  0011 09/13/21  2358 09/13/21  0625   ALBUMIN g/dL 3.10* 3.50 4.50   BILIRUBIN mg/dL 0.3 0.8 0.9   ALK PHOS U/L 71 79 100   AST (SGOT) U/L 37 42* 54*   ALT (SGPT) U/L 23 21 28     Results from last 7 days   Lab Units 09/15/21  0011 09/13/21  2358 09/13/21  0625   CALCIUM mg/dL 8.2* 8.3* 9.3   ALBUMIN g/dL 3.10* 3.50 4.50   MAGNESIUM mg/dL  --   --  2.0     Results from last 7 days   Lab Units 09/13/21  0625   LIPASE U/L 27             Invalid input(s): LDLCALC      Results from last 7 days   Lab Units 09/13/21  0824   COVID19  Not Detected       Test Results Pending at Discharge       Discharge Details         Discharge Medications      New Medications      Instructions Start Date   folic acid 1 MG tablet  Commonly known as: FOLVITE   1 mg, Oral, Daily   Start Date: September 16, 2021     pantoprazole 40 MG EC tablet  Commonly known as: PROTONIX   40 mg, Oral, 2 Times Daily Before Meals      sucralfate 1 g tablet  Commonly known as: CARAFATE   1 g, Oral, 3 Times Daily Before Meals      thiamine 100 MG tablet tablet  Commonly known as: VITAMIN B-1   100 mg, Oral, Daily   Start Date: September 16, 2021        Changes to Medications      Instructions Start Date   Eliquis 5 MG tablet tablet  Generic drug: apixaban  What changed:   · when to take this  · Another medication with the same name was removed. Continue taking this medication, and follow the directions you see here.   5 mg, Oral, Every 12 Hours Scheduled         Continue These Medications      Instructions Start Date   multivitamin with minerals tablet tablet  Generic drug: multivitamin with minerals   1 tablet, Oral, Daily      QUEtiapine 300 MG tablet  Commonly known as: SEROquel   300 mg, Oral, Nightly         Stop These Medications    famotidine 40 MG tablet  Commonly known as: PEPCID     ondansetron ODT 4 MG disintegrating tablet  Commonly known as: ZOFRAN-ODT            Allergies   Allergen Reactions   • Penicillin G Unknown - Low Severity       Discharge Disposition:  Home or Self Care      Discharge Diet:  Diet Order   Procedures   • Diet Regular; GI Soft       Discharge Activity:   Activity Instructions     Activity as Tolerated            CODE STATUS:    Code Status and Medical Interventions:   Ordered at: 09/13/21 0852     Code Status:    CPR     Medical Interventions (Level of Support Prior to Arrest):    Full       No future appointments.  Follow-up Information     Provider, No Known. Schedule an appointment as soon as possible for a visit in 1 week(s).    Why: Follow up primary care physician in 1 week  Contact information:  [facility]  SYSTEM  [facility] [id number] [phone]             Allyson Magaña MD Follow up in 2 week(s).    Specialty: Gastroenterology  Contact information:  [address]  [place] [id number] [phone]                   Time Spent on Discharge:  Greater than 30 minutes      KAREL Glass  [facility]  09/15/21  13:50 EDT

## 2021-09-15 NOTE — NURSING NOTE
Refusing H & H scheduled every 8 hours. Hgb is stable at 11.7. Also, he is refusing to wear telemetry monitor. Notified MD.

## 2021-09-15 NOTE — CASE MANAGEMENT/SOCIAL WORK
Case Management Discharge Note      Final Note: Discharged, patient states his plan is to go to [facility].    Provided Post Acute Provider List?: N/A  N/A Provider List Comment: Denies HH/SNF needs.  Provided Post Acute Provider Quality & Resource List?: N/A  N/A Quality & Resource List Comment: Denies HH/SNF needs.    Selected Continued Care - Discharged on 9/15/2021 Admission date: 9/13/2021 - Discharge disposition: Home or Self Care    Destination    No services have been selected for the patient.              Durable Medical Equipment    No services have been selected for the patient.              Dialysis/Infusion    No services have been selected for the patient.              Home Medical Care    No services have been selected for the patient.              Therapy    No services have been selected for the patient.              Community Resources    No services have been selected for the patient.              Community & DME    No services have been selected for the patient.                  Transportation Services  Taxi: other    Final Discharge Disposition Code: 01 - home or self-care

## 2021-09-15 NOTE — PLAN OF CARE
Goal Outcome Evaluation:      history of schizophrenia, alcohol dependence, hepatitis C, hx dvt who presented to [facility] initially complaining of abdominal pain with n/v. He was found to have thickening of distal esophagus on CT and reported coffee ground emesis and was admitted to the hospital for further evaluation and treatment.  He was started on CIWA protocol due to alcohol abuse. GI was consulted. He underwent EGD on 9/14/21 that showed esophagitis, gastritis & duodenitis. Biopsies were collected. He has been started on BID PPI and carafate. He will need repeat EGD in ~ 12 weeks and follow up on biopsies.  No further n/v. Access and Psychiatry have evaluated. He has continued on seroquel for schizophrenia.  New prescriptions for medications will be given. CCP has given appropriate resources for discharge. Pt plans to seek IP treatment at [facility].

## 2021-09-15 NOTE — PROGRESS NOTES
Gastroenterology   Inpatient Progress Note    Reason for Follow Up:  GI bleed, abdominal pain    Subjective  Interval History:   Resting comfortably, CIWA monitoring/protocol for alcohol withdrawal.  Patient sleeping but arousable to sound, answers questions appropriately.  Denies abdominal pain, nausea.  Tolerating oral intake.    Current Facility-Administered Medications:   •  acetaminophen (TYLENOL) tablet 650 mg, 650 mg, Oral, Q4H PRN **OR** acetaminophen (TYLENOL) 160 MG/5ML solution 650 mg, 650 mg, Oral, Q4H PRN **OR** acetaminophen (TYLENOL) suppository 650 mg, 650 mg, Rectal, Q4H PRN, Allyson Magaña MD  •  thiamine (VITAMIN B-1) tablet 100 mg, 100 mg, Oral, Daily, 100 mg at 09/15/21 0824 **AND** multivitamin (THERAGRAN) tablet 1 tablet, 1 tablet, Oral, Daily, 1 tablet at 09/15/21 0824 **AND** folic acid (FOLVITE) tablet 1 mg, 1 mg, Oral, Daily, Dk Coker MD, 1 mg at 09/15/21 0824  •  LORazepam (ATIVAN) tablet 1 mg, 1 mg, Oral, Q2H PRN, 1 mg at 09/14/21 2011 **OR** LORazepam (ATIVAN) injection 1 mg, 1 mg, Intravenous, Q2H PRN, 1 mg at 09/15/21 0110 **OR** LORazepam (ATIVAN) tablet 2 mg, 2 mg, Oral, Q1H PRN **OR** LORazepam (ATIVAN) injection 2 mg, 2 mg, Intravenous, Q1H PRN, 2 mg at 09/14/21 1514 **OR** LORazepam (ATIVAN) injection 2 mg, 2 mg, Intravenous, Q15 Min PRN, 2 mg at 09/13/21 1216 **OR** LORazepam (ATIVAN) injection 2 mg, 2 mg, Intramuscular, Q15 Min PRN, Allyson Magaña MD  •  multivitamin with minerals 1 tablet, 1 tablet, Oral, Daily, Allyson Magaña MD, 1 tablet at 09/15/21 0825  •  nitroglycerin (NITROSTAT) SL tablet 0.4 mg, 0.4 mg, Sublingual, Q5 Min PRN, Allyson Magaña MD  •  ondansetron (ZOFRAN) tablet 4 mg, 4 mg, Oral, Q6H PRN **OR** ondansetron (ZOFRAN) injection 4 mg, 4 mg, Intravenous, Q6H PRN, Allyson Magaña MD, 4 mg at 09/14/21 2012  •  pantoprazole (PROTONIX) EC tablet 40 mg, 40 mg, Oral, BID AC, Allyson Magaña MD, 40 mg at 09/15/21 0823  •  QUEtiapine (SEROquel) tablet  300 mg, 300 mg, Oral, Nightly, Allyson Magaña MD, 300 mg at 09/14/21 2011  •  [COMPLETED] Insert peripheral IV, , , Once **AND** sodium chloride 0.9 % flush 10 mL, 10 mL, Intravenous, PRN, Allyson Magaña MD  •  sodium chloride 0.9 % infusion, 30 mL/hr, Intravenous, Continuous PRN, Allyson Magaña MD, Stopped at 09/14/21 1200  •  sucralfate (CARAFATE) tablet 1 g, 1 g, Oral, TID AC, Allyson Magaña MD, 1 g at 09/15/21 0824  Review of Systems:               The following systems were reviewed and negative;  gastrointestinal    Objective     Vital Signs  Temp:  [98.2 °F (36.8 °C)-98.3 °F (36.8 °C)] 98.2 °F (36.8 °C)  Heart Rate:  [71-86] 72  Resp:  [16-18] 18  BP: ()/() 144/100  Body mass index is 26.19 kg/m².                  Physical Exam:              General: Sleeping but arousable to sound, cooperative              Eyes: no scleral icterus              Skin: warm and dry, not jaundiced              Abdomen: soft, nontender, nondistended; normal bowel sounds              Psychiatric: Appropriate affect and behavior                Results Review:                I reviewed the patient's new clinical results.    Results from last 7 days   Lab Units 09/15/21  0011 09/14/21  0955 09/13/21  2358 09/13/21  1909 09/13/21  0625   WBC 10*3/mm3 10.43  --  17.87*  --  18.71*   HEMOGLOBIN g/dL 11.7*  11.7* 11.9* 11.7*  11.7*   < > 14.4   HEMATOCRIT % 37.4*  37.4* 35.7* 34.9*  34.9*   < > 41.1   PLATELETS 10*3/mm3 292  --  298  --  365    < > = values in this interval not displayed.     Results from last 7 days   Lab Units 09/15/21  0011 09/13/21  2358 09/13/21  0625   SODIUM mmol/L 139 137 129*   POTASSIUM mmol/L 4.1 3.8 3.7   CHLORIDE mmol/L 107 102 87*   CO2 mmol/L 23.5 26.4 29.4*   BUN mg/dL 10 6 10   CREATININE mg/dL 0.93 0.82 0.85   CALCIUM mg/dL 8.2* 8.3* 9.3   BILIRUBIN mg/dL 0.3 0.8 0.9   ALK PHOS U/L 71 79 100   ALT (SGPT) U/L 23 21 28   AST (SGOT) U/L 37 42* 54*   GLUCOSE mg/dL 102* 94 149*      Results from last 7 days   Lab Units 09/13/21  0625   INR  1.06     Lab Results   Lab Value Date/Time    LIPASE 27 09/13/2021 0625    LIPASE 94 (H) 08/26/2021 0428       Radiology:  CT Abdomen Pelvis With Contrast   Final Result   There has been interval marked progression of the distal   esophageal wall thickening that is low-density and circumferential. No   pneumomediastinum within the visualized portion. Otherwise the abdomen   and pelvis is likely unremarkable.       These findings were discussed with Dr. Schilling by telephone.       Radiation dose reduction techniques were utilized, including automated   exposure control and exposure modulation based on body size.       This report was finalized on 9/14/2021 2:19 PM by Dr. Vito Gaxiola M.D.              Assessment/Plan     Patient Active Problem List   Diagnosis   • History of DVT (deep vein thrombosis)   • Alcohol dependence (CMS/HCC)   • Esophagitis   • Gastritis and duodenitis       Assessment:  1. EGD with esophagitis, gastritis and duodenitis with abnormal CT scan with thickening of the esophagitis  2. Alcohol abuse with elevated transaminase, currently monitoring for alcohol withdrawal    These problems are new to me  Plan:  · Continue twice daily PPI x12 weeks, until repeat EGD  · Continue Carafate  · Soft diet  · Recent EGD biopsies pending  · Recommend repeat EGD in 12 weeks with Dr. Allyson Magaña for follow-up of esophagitis    I discussed the patients findings and my recommendations with patient and nursing staff.           Hellen VINSON  [facility] [place] [id number]  Office: [phone]

## 2021-09-16 ENCOUNTER — TELEPHONE (OUTPATIENT)
Dept: GASTROENTEROLOGY | Facility: CLINIC | Age: 56
End: 2021-09-16

## 2021-09-16 NOTE — PAYOR COMM NOTE
"Lily Paz (56 y.o. Male)    ATTN: DISCHARGE SUMMARY FOR REVIEW:  [id number]    [facility] DEPT:  [phone],  [phone]         Date of Birth Social Security Number Address Home Phone MRN    1965  [address] [facility] [address] [phone] [id number]    Church Marital Status          None Single       Admission Date Admission Type Admitting Provider Attending Provider Department, Room/Bed    9/13/21 Emergency Dk Coker MD  [address], S409/1    Discharge Date Discharge Disposition Discharge Destination        9/15/2021 Home or Self Care              Attending Provider: (none)   Allergies: Penicillin G    Isolation: None   Infection: None   Code Status: Prior    Ht: 177.8 cm (70\")   Wt: 82.8 kg (182 lb 8 oz)    Admission Cmt: None   Principal Problem: Esophagitis [K20.90]                 Active Insurance as of 9/13/2021     Primary Coverage     Payor Plan Insurance Group Employer/Plan Group    ANTH MEDICAID ANTH MEDICAID [id number]     Payor Plan Address Payor Plan Phone Number Payor Plan Fax Number Effective Dates    [address] [address] [phone]  9/1/2020 - None Entered    [facility] [id number]       Subscriber Name Subscriber Birth Date Member ID       LILY PAZ 1965 [id number]                 Emergency Contacts      (Rel.) Home Phone Work Phone Mobile Phone    Xiang Prakash (Guardian) -- -- [phone]               Operative/Procedure Notes (all)      Procedures signed by Allyson Magaña MD at 09/14/21 1142   Version 1 of 1     Procedure Orders    1. UPPER GI ENDOSCOPY [[id number]] ordered by Allyson Magaña MD at 09/14/21 1119          Scan on 9/14/2021 1119 by Allyson Magaña MD: EGD          Electronically signed by Allyson Magaña MD at 09/14/21 1142          Discharge Summary      Shyla Cordova APRN at 09/15/21 1350     Attestation signed by Narciso Knowles MD at 09/15/21 6125    Addendum: I have " reviewed the history and plan as obtained by KAREL Lorenzana and performed my own independent history. I have personally examined the patient. My exam confirms her physical findings and I agree with the plan as listed above, with the addition of the following:     No complaints today.  Not going through any signifiacnt alcohol withdrawal at this point.  AAOx3, NAD, RRR, abd soft and nontender.  He will remain on tx for esophagitis and follow up with GI in 12 weeks for repeat scope.  He will be discharged today.  See APRN note for full details.    Narciso Knowles MD  [facility]  09/15/21  17:11 EDT                          Patient Name: David Villalta  : 1965  MRN: [id number]    Date of Admission: 2021  Date of Discharge:  9/15/2021  Primary Care Physician: Provider, No Known      Chief Complaint:   Vomiting Blood and Abdominal Pain      Discharge Diagnoses     Active Hospital Problems    Diagnosis  POA   • **Esophagitis [K20.90]  Yes   • Gastritis and duodenitis [K29.90]  Yes   • History of DVT (deep vein thrombosis) [Z86.718]  Not Applicable   • Alcohol dependence (CMS/HCC) [F10.20]  Yes      Resolved Hospital Problems    Diagnosis Date Resolved POA   • Hematemesis with nausea [K92.0] 09/15/2021 Yes   • Esophageal thickening [K22.8] 09/15/2021 Yes   • Hyponatremia [E87.1] 09/15/2021 Yes   • Leukocytosis [D72.829] 09/15/2021 Yes        Hospital Course     Mr. Villalta is a 56 y.o. male with a history of schizophrenia, alcohol dependence, hepatitis C, hx dvt who presented to [facility] initially complaining of abdominal pain with n/v.  Please see the admitting history and physical for further details.  He was found to have thickening of distal esophagus on CT and reported coffee ground emesis and was admitted to the hospital for further evaluation and treatment.  He was started on CIWA protocol due to alcohol abuse. GI was consulted. He underwent EGD on  9/14/21 that showed Grade D esophagitis, gastritis & duodenitis. Biopsies were collected. He has been started on BID PPI and carafate. He will need repeat EGD in ~ 12 weeks and follow up on biopsies. Hgb has remained stable; no indication for PRBCs. No further n/v. Access and Psychiatry have evaluated. He has continued on seroquel for schizophrenia. He has reported intermittent episodes of paranoia and hallucinations with some refusal of care but has returned close to baseline. He is interested in evaluation at [facility] at discharge. He had stopped taking Eliquis ~ 1 week prior to admission due to running out of medications. New prescriptions for medications will be given. [facility] has given appropriate resources for discharge.  Day of Discharge     Subjective:  Up in room. Tolerating po. Wants to be evaluated at [facility] at discharge. No medical complaints.    Physical Exam:  Temp:  [98.2 °F (36.8 °C)-98.3 °F (36.8 °C)] 98.2 °F (36.8 °C)  Heart Rate:  [71-86] 72  Resp:  [16-18] 18  BP: ()/() 144/100  Body mass index is 26.19 kg/m².  Physical Exam  Vitals and nursing note reviewed.   Constitutional:       General: He is not in acute distress.  HENT:      Head: Normocephalic.      Mouth/Throat:      Mouth: Mucous membranes are moist.   Eyes:      Conjunctiva/sclera: Conjunctivae normal.   Cardiovascular:      Rate and Rhythm: Normal rate and regular rhythm.   Pulmonary:      Effort: Pulmonary effort is normal. No respiratory distress.      Breath sounds: Normal breath sounds.   Abdominal:      General: Bowel sounds are normal.      Palpations: Abdomen is soft.   Musculoskeletal:      Cervical back: Neck supple.      Right lower leg: No edema.      Left lower leg: No edema.   Skin:     General: Skin is warm and dry.   Neurological:      Mental Status: He is alert. Mental status is at baseline.   Psychiatric:         Mood and Affect: Mood normal.         Behavior: Behavior normal.         Consultants     Consult Orders  (all) (From admission, onward)     Start     Ordered    09/14/21 1214  Inpatient Case Management  Consult  Once     Provider:  (Not yet assigned)    09/14/21 1214    09/14/21 0943  Inpatient Psychiatrist Consult  Once     Specialty:  Psychiatry  Provider:  Jass Rg III, MD    09/14/21 0942    09/13/21 1634  Inpatient Case Management  Consult  Once,   Status:  Canceled     Provider:  (Not yet assigned)    09/13/21 1633    09/13/21 1104  Inpatient Access Center Consult  Once     Comments: Alcohol dependence and schizophrenia w/recent paranoia   Provider:  (Not yet assigned)    09/13/21 1103    09/13/21 0853  Inpatient Gastroenterology Consult  Once     Specialty:  Gastroenterology  Provider:  Allyson Magaña MD    09/13/21 0852    09/13/21 0809  A (on-call MD unless specified) Details  Once     Specialty:  Hospitalist  Provider:  (Not yet assigned)    09/13/21 0808              Procedures     ESOPHAGOGASTRODUODENOSCOPY WITH COLD BIOPSIES      Imaging Results (All)     Procedure Component Value Units Date/Time    CT Abdomen Pelvis With Contrast [[id number]] Collected: 09/13/21 0805     Updated: 09/14/21 1422    Narrative:      CT ABDOMEN AND PELVIS WITH CONTRAST     HISTORY: Upper abdominal pain with hematemesis.     TECHNIQUE: Axial CT images of the abdomen and pelvis were obtained  following administration of intravenous contrast. The patient was not  given oral contrast Coronal and sagittal reformats were obtained.     COMPARISON: 08/26/2021     FINDINGS: There is marked low-density wall thickening within the distal  esophagus that has significantly increased in the interim from prior CT,  most concerning for esophagitis. No pneumomediastinum identified within  the visualized abdomen.     Evaluation of the upper abdomen is limited secondary to motion. The  proximal small bowel loops are mildly dilated most suggestive of mild  ileus. Otherwise, there is no evidence of  bowel obstruction. The  appendix is normal. Moderate amount of formed stool is seen within the  colon. No focal hepatic lesions nor intrahepatic biliary dilatation.  There is a mildly prominent portacaval lymph node that is favored to be  reactive. The gallbladder is normal. 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 The pancreas is  normal without ductal dilatation. Bilateral adrenal glands are normal.  Both kidneys are normal in size and attenuation. No hydronephrosis. The  urinary bladder is partially distended and normal. There is no  pathological retroperitoneal lymphadenopathy. No ascites.       Impression:      There has been interval marked progression of the distal  esophageal wall thickening that is low-density and circumferential. No  pneumomediastinum within the visualized portion. Otherwise the abdomen  and pelvis is likely unremarkable.     These findings were discussed with Dr. Schilling by telephone.     Radiation dose reduction techniques were utilized, including automated  exposure control and exposure modulation based on body size.     This report was finalized on 9/14/2021 2:19 PM by Dr. Vito Gaxiola M.D.               Pertinent Labs     Results from last 7 days   Lab Units 09/15/21  0011 09/14/21  0955 09/13/21 2358 09/13/21  1909 09/13/21  0625 09/13/21  0625   WBC 10*3/mm3 10.43  --  17.87*  --   --  18.71*   HEMOGLOBIN g/dL 11.7*  11.7* 11.9* 11.7*  11.7* 12.4*   < > 14.4   PLATELETS 10*3/mm3 292  --  298  --   --  365    < > = values in this interval not displayed.     Results from last 7 days   Lab Units 09/15/21  0011 09/13/21 2358 09/13/21  0625   SODIUM mmol/L 139 137 129*   POTASSIUM mmol/L 4.1 3.8 3.7   CHLORIDE mmol/L 107 102 87*   CO2 mmol/L 23.5 26.4 29.4*   BUN mg/dL 10 6 10   CREATININE mg/dL 0.93 0.82 0.85   GLUCOSE mg/dL 102* 94 149*   Estimated Creatinine Clearance: 106.3 mL/min (by C-G formula based on SCr of 0.93  mg/dL).  Results from last 7 days   Lab Units 09/15/21  0011 09/13/21 2358 09/13/21  0625   ALBUMIN g/dL 3.10* 3.50 4.50   BILIRUBIN mg/dL 0.3 0.8 0.9   ALK PHOS U/L 71 79 100   AST (SGOT) U/L 37 42* 54*   ALT (SGPT) U/L 23 21 28     Results from last 7 days   Lab Units 09/15/21  0011 09/13/21  2358 09/13/21  0625   CALCIUM mg/dL 8.2* 8.3* 9.3   ALBUMIN g/dL 3.10* 3.50 4.50   MAGNESIUM mg/dL  --   --  2.0     Results from last 7 days   Lab Units 09/13/21  0625   LIPASE U/L 27             Invalid input(s): LDLCALC      Results from last 7 days   Lab Units 09/13/21  0824   COVID19  Not Detected       Test Results Pending at Discharge       Discharge Details        Discharge Medications      New Medications      Instructions Start Date   folic acid 1 MG tablet  Commonly known as: FOLVITE   1 mg, Oral, Daily   Start Date: September 16, 2021     pantoprazole 40 MG EC tablet  Commonly known as: PROTONIX   40 mg, Oral, 2 Times Daily Before Meals      sucralfate 1 g tablet  Commonly known as: CARAFATE   1 g, Oral, 3 Times Daily Before Meals      thiamine 100 MG tablet tablet  Commonly known as: VITAMIN B-1   100 mg, Oral, Daily   Start Date: September 16, 2021        Changes to Medications      Instructions Start Date   Eliquis 5 MG tablet tablet  Generic drug: apixaban  What changed:   · when to take this  · Another medication with the same name was removed. Continue taking this medication, and follow the directions you see here.   5 mg, Oral, Every 12 Hours Scheduled         Continue These Medications      Instructions Start Date   multivitamin with minerals tablet tablet  Generic drug: multivitamin with minerals   1 tablet, Oral, Daily      QUEtiapine 300 MG tablet  Commonly known as: SEROquel   300 mg, Oral, Nightly         Stop These Medications    famotidine 40 MG tablet  Commonly known as: PEPCID     ondansetron ODT 4 MG disintegrating tablet  Commonly known as: ZOFRAN-ODT            Allergies   Allergen Reactions    • Penicillin G Unknown - Low Severity       Discharge Disposition:  Home or Self Care      Discharge Diet:  Diet Order   Procedures   • Diet Regular; GI Soft       Discharge Activity:   Activity Instructions     Activity as Tolerated            CODE STATUS:    Code Status and Medical Interventions:   Ordered at: 09/13/21 0852     Code Status:    CPR     Medical Interventions (Level of Support Prior to Arrest):    Full       No future appointments.  Follow-up Information     Provider, No Known. Schedule an appointment as soon as possible for a visit in 1 week(s).    Why: Follow up primary care physician in 1 week  Contact information:  [facility] [address] [phone]             Allyson Magaña MD Follow up in 2 week(s).    Specialty: Gastroenterology  Contact information:  [address] [facility] [address]  [facility] [id number] [phone]                   Time Spent on Discharge:  Greater than 30 minutes      KAREL Glass  [facility]  09/15/21  13:50 EDT                Electronically signed by Narciso Knowles MD at 09/15/21 1712                   Beth Juarez RN      Case Management   Case Management/Social Work   Signed   Date of Service:  09/15/21 1404   Creation Time:  09/15/21 1542            Signed             Show:Clear all  []Manual[x]Template[]Copied    Added by:  [x]Beth Juarez RN    []Manolo for details  Case Management Discharge Note        Final Note: Discharged, patient states his plan is to go to [facility].     Provided Post Acute Provider List?: N/A  N/A Provider List Comment: Denies HH/SNF needs.  Provided Post Acute Provider Quality & Resource List?: N/A  N/A Quality & Resource List Comment: Denies HH/SNF needs.         Selected Continued Care - Discharged on 9/15/2021 Admission date: 9/13/2021 - Discharge disposition: Home or Self Care     Destination    No services have been selected for the patient.                  Durable Medical Equipment    No services have been selected for the patient.                 Dialysis/Infusion    No services have been selected for the patient.                 Home Medical Care    No services have been selected for the patient.                 Therapy    No services have been selected for the patient.                 Community Resources    No services have been selected for the patient.                 Community & DME    No services have been selected for the patient.                      Transportation Services  Taxi: other     Final Discharge Disposition Code: 01 - home or self-care

## 2021-09-16 NOTE — PROGRESS NOTES
EGD biopsies show some mild inflammation of the small bowel, mild edema of the gastric tissue but no bacterial infection.    He should be taking the pantoprazole twice dailyOffice follow-up with me or nurse practitioner in 6 to 8 weeks, can arrange for repeat EGD at that time, thanks

## 2021-09-16 NOTE — TELEPHONE ENCOUNTER
----- Message from Allyson Magaña MD sent at 9/16/2021  3:25 PM EDT -----  EGD biopsies show some mild inflammation of the small bowel, mild edema of the gastric tissue but no bacterial infection.    He should be taking the pantoprazole twice dailyOffice follow-up with me or nurse practitioner in 6 to 8 weeks, can arrange for repeat EGD at that time, thanks

## 2021-09-17 NOTE — ANESTHESIA POSTPROCEDURE EVALUATION
"Patient: David Villalta    Procedure Summary     Date: 09/14/21 Room / Location:  [facility] /  [facility]    Anesthesia Start: 1121 Anesthesia Stop: 1143    Procedure: ESOPHAGOGASTRODUODENOSCOPY WITH COLD BIOPSIES (N/A Esophagus) Diagnosis:       Hematemesis with nausea      Esophageal thickening      (Hematemesis with nausea [K92.0])      (Esophageal thickening [K22.8])    Surgeons: Allyson Magaña MD Provider: Betty Anders MD    Anesthesia Type: MAC ASA Status: 3 - Emergent          Anesthesia Type: MAC    Vitals  Vitals Value Taken Time   /76 09/14/21 1211   Temp     Pulse 73 09/14/21 1211   Resp 16 09/14/21 1211   SpO2 94 % 09/14/21 1211           Post Anesthesia Care and Evaluation      Comments: Patient discharged before being evaluated by an Anesthesiologist. No apparent complications per the record.  This case was not medically directed. I am completing this chart for medical records purposes; I personally have no medical involvement with this patient.    /100 (BP Location: Right arm, Patient Position: Lying)   Pulse 72   Temp 36.8 °C (98.2 °F) (Oral)   Resp 18   Ht 177.8 cm (70\")   Wt 82.8 kg (182 lb 8 oz)   SpO2 92%   BMI 26.19 kg/m²           "

## 2021-10-29 ENCOUNTER — APPOINTMENT (OUTPATIENT)
Dept: GENERAL RADIOLOGY | Facility: HOSPITAL | Age: 56
End: 2021-10-29

## 2021-10-29 ENCOUNTER — APPOINTMENT (OUTPATIENT)
Dept: CT IMAGING | Facility: HOSPITAL | Age: 56
End: 2021-10-29

## 2021-10-29 ENCOUNTER — HOSPITAL ENCOUNTER (INPATIENT)
Facility: HOSPITAL | Age: 56
LOS: 8 days | Discharge: HOME | End: 2021-11-06
Attending: EMERGENCY MEDICINE | Admitting: HOSPITALIST

## 2021-10-29 DIAGNOSIS — L03.818 CELLULITIS OF OTHER SPECIFIED SITE: Primary | ICD-10-CM

## 2021-10-29 DIAGNOSIS — D72.829 LEUKOCYTOSIS, UNSPECIFIED TYPE: ICD-10-CM

## 2021-10-29 DIAGNOSIS — F19.10 POLYDRUG ABUSE (HCC): ICD-10-CM

## 2021-10-29 PROBLEM — L03.90 CELLULITIS: Status: ACTIVE | Noted: 2021-10-29

## 2021-10-29 LAB
ALBUMIN SERPL-MCNC: 4.6 G/DL (ref 3.5–5.2)
ALBUMIN/GLOB SERPL: 1.4 G/DL
ALP SERPL-CCNC: 93 U/L (ref 39–117)
ALT SERPL W P-5'-P-CCNC: 32 U/L (ref 1–41)
AMPHET+METHAMPHET UR QL: POSITIVE
ANION GAP SERPL CALCULATED.3IONS-SCNC: 13.2 MMOL/L (ref 5–15)
AST SERPL-CCNC: 56 U/L (ref 1–40)
BARBITURATES UR QL SCN: NEGATIVE
BASOPHILS # BLD AUTO: 0.03 10*3/MM3 (ref 0–0.2)
BASOPHILS NFR BLD AUTO: 0.2 % (ref 0–1.5)
BENZODIAZ UR QL SCN: NEGATIVE
BILIRUB SERPL-MCNC: 0.9 MG/DL (ref 0–1.2)
BILIRUB UR QL STRIP: NEGATIVE
BUN SERPL-MCNC: 42 MG/DL (ref 6–20)
BUN/CREAT SERPL: 43.3 (ref 7–25)
CALCIUM SPEC-SCNC: 9.1 MG/DL (ref 8.6–10.5)
CANNABINOIDS SERPL QL: NEGATIVE
CHLORIDE SERPL-SCNC: 94 MMOL/L (ref 98–107)
CLARITY UR: CLEAR
CO2 SERPL-SCNC: 26.8 MMOL/L (ref 22–29)
COCAINE UR QL: NEGATIVE
COLOR UR: YELLOW
CREAT SERPL-MCNC: 0.97 MG/DL (ref 0.76–1.27)
DEPRECATED RDW RBC AUTO: 40.5 FL (ref 37–54)
EOSINOPHIL # BLD AUTO: 0.06 10*3/MM3 (ref 0–0.4)
EOSINOPHIL NFR BLD AUTO: 0.3 % (ref 0.3–6.2)
ERYTHROCYTE [DISTWIDTH] IN BLOOD BY AUTOMATED COUNT: 13.4 % (ref 12.3–15.4)
ETHANOL BLD-MCNC: <10 MG/DL (ref 0–10)
ETHANOL UR QL: <0.01 %
GFR SERPL CREATININE-BSD FRML MDRD: 81 ML/MIN/1.73
GLOBULIN UR ELPH-MCNC: 3.2 GM/DL
GLUCOSE SERPL-MCNC: 118 MG/DL (ref 65–99)
GLUCOSE UR STRIP-MCNC: NEGATIVE MG/DL
HCT VFR BLD AUTO: 39.3 % (ref 37.5–51)
HGB BLD-MCNC: 13.4 G/DL (ref 13–17.7)
HGB UR QL STRIP.AUTO: NEGATIVE
HOLD SPECIMEN: NORMAL
HOLD SPECIMEN: NORMAL
IMM GRANULOCYTES # BLD AUTO: 0.09 10*3/MM3 (ref 0–0.05)
IMM GRANULOCYTES NFR BLD AUTO: 0.5 % (ref 0–0.5)
KETONES UR QL STRIP: ABNORMAL
LEUKOCYTE ESTERASE UR QL STRIP.AUTO: NEGATIVE
LIPASE SERPL-CCNC: 23 U/L (ref 13–60)
LYMPHOCYTES # BLD AUTO: 1.23 10*3/MM3 (ref 0.7–3.1)
LYMPHOCYTES NFR BLD AUTO: 6.9 % (ref 19.6–45.3)
MAGNESIUM SERPL-MCNC: 2.7 MG/DL (ref 1.6–2.6)
MCH RBC QN AUTO: 29.2 PG (ref 26.6–33)
MCHC RBC AUTO-ENTMCNC: 34.1 G/DL (ref 31.5–35.7)
MCV RBC AUTO: 85.6 FL (ref 79–97)
METHADONE UR QL SCN: NEGATIVE
MONOCYTES # BLD AUTO: 2.36 10*3/MM3 (ref 0.1–0.9)
MONOCYTES NFR BLD AUTO: 13.2 % (ref 5–12)
MRSA DNA SPEC QL NAA+PROBE: ABNORMAL
NEUTROPHILS NFR BLD AUTO: 14.09 10*3/MM3 (ref 1.7–7)
NEUTROPHILS NFR BLD AUTO: 78.9 % (ref 42.7–76)
NITRITE UR QL STRIP: NEGATIVE
NRBC BLD AUTO-RTO: 0 /100 WBC (ref 0–0.2)
OPIATES UR QL: NEGATIVE
OXYCODONE UR QL SCN: NEGATIVE
PH UR STRIP.AUTO: 6 [PH] (ref 5–8)
PLATELET # BLD AUTO: 424 10*3/MM3 (ref 140–450)
PMV BLD AUTO: 10.4 FL (ref 6–12)
POTASSIUM SERPL-SCNC: 4.1 MMOL/L (ref 3.5–5.2)
PROT SERPL-MCNC: 7.8 G/DL (ref 6–8.5)
PROT UR QL STRIP: NEGATIVE
QT INTERVAL: 368 MS
RBC # BLD AUTO: 4.59 10*6/MM3 (ref 4.14–5.8)
SARS-COV-2 RNA RESP QL NAA+PROBE: NOT DETECTED
SODIUM SERPL-SCNC: 134 MMOL/L (ref 136–145)
SP GR UR STRIP: 1.03 (ref 1–1.03)
TROPONIN T SERPL-MCNC: <0.01 NG/ML (ref 0–0.03)
UROBILINOGEN UR QL STRIP: ABNORMAL
WBC # BLD AUTO: 17.86 10*3/MM3 (ref 3.4–10.8)
WHOLE BLOOD HOLD SPECIMEN: NORMAL
WHOLE BLOOD HOLD SPECIMEN: NORMAL

## 2021-10-29 PROCEDURE — G0378 HOSPITAL OBSERVATION PER HR: HCPCS

## 2021-10-29 PROCEDURE — 80307 DRUG TEST PRSMV CHEM ANLYZR: CPT | Performed by: NURSE PRACTITIONER

## 2021-10-29 PROCEDURE — 83690 ASSAY OF LIPASE: CPT | Performed by: NURSE PRACTITIONER

## 2021-10-29 PROCEDURE — 74177 CT ABD & PELVIS W/CONTRAST: CPT

## 2021-10-29 PROCEDURE — 84484 ASSAY OF TROPONIN QUANT: CPT | Performed by: NURSE PRACTITIONER

## 2021-10-29 PROCEDURE — 71045 X-RAY EXAM CHEST 1 VIEW: CPT

## 2021-10-29 PROCEDURE — 25010000002 VANCOMYCIN 10 G RECONSTITUTED SOLUTION: Performed by: INTERNAL MEDICINE

## 2021-10-29 PROCEDURE — 93005 ELECTROCARDIOGRAM TRACING: CPT | Performed by: NURSE PRACTITIONER

## 2021-10-29 PROCEDURE — 25010000002 CEFTRIAXONE PER 250 MG: Performed by: NURSE PRACTITIONER

## 2021-10-29 PROCEDURE — 87641 MR-STAPH DNA AMP PROBE: CPT | Performed by: INTERNAL MEDICINE

## 2021-10-29 PROCEDURE — 82077 ASSAY SPEC XCP UR&BREATH IA: CPT | Performed by: NURSE PRACTITIONER

## 2021-10-29 PROCEDURE — 25010000002 ONDANSETRON PER 1 MG: Performed by: NURSE PRACTITIONER

## 2021-10-29 PROCEDURE — 36415 COLL VENOUS BLD VENIPUNCTURE: CPT

## 2021-10-29 PROCEDURE — 80053 COMPREHEN METABOLIC PANEL: CPT | Performed by: NURSE PRACTITIONER

## 2021-10-29 PROCEDURE — U0003 INFECTIOUS AGENT DETECTION BY NUCLEIC ACID (DNA OR RNA); SEVERE ACUTE RESPIRATORY SYNDROME CORONAVIRUS 2 (SARS-COV-2) (CORONAVIRUS DISEASE [COVID-19]), AMPLIFIED PROBE TECHNIQUE, MAKING USE OF HIGH THROUGHPUT TECHNOLOGIES AS DESCRIBED BY CMS-2020-01-R: HCPCS | Performed by: NURSE PRACTITIONER

## 2021-10-29 PROCEDURE — 84145 PROCALCITONIN (PCT): CPT | Performed by: INTERNAL MEDICINE

## 2021-10-29 PROCEDURE — 25010000002 THIAMINE PER 100 MG: Performed by: NURSE PRACTITIONER

## 2021-10-29 PROCEDURE — 25010000002 IOPAMIDOL 61 % SOLUTION: Performed by: EMERGENCY MEDICINE

## 2021-10-29 PROCEDURE — 81003 URINALYSIS AUTO W/O SCOPE: CPT | Performed by: NURSE PRACTITIONER

## 2021-10-29 PROCEDURE — 83735 ASSAY OF MAGNESIUM: CPT | Performed by: NURSE PRACTITIONER

## 2021-10-29 PROCEDURE — 99285 EMERGENCY DEPT VISIT HI MDM: CPT

## 2021-10-29 PROCEDURE — 85025 COMPLETE CBC W/AUTO DIFF WBC: CPT | Performed by: NURSE PRACTITIONER

## 2021-10-29 PROCEDURE — 93010 ELECTROCARDIOGRAM REPORT: CPT | Performed by: INTERNAL MEDICINE

## 2021-10-29 RX ORDER — VANCOMYCIN HYDROCHLORIDE 1 G/200ML
12.5 INJECTION, SOLUTION INTRAVENOUS EVERY 12 HOURS
Status: DISCONTINUED | OUTPATIENT
Start: 2021-10-30 | End: 2021-10-30

## 2021-10-29 RX ORDER — MULTIPLE VITAMINS W/ MINERALS TAB 9MG-400MCG
1 TAB ORAL DAILY
Status: DISCONTINUED | OUTPATIENT
Start: 2021-10-29 | End: 2021-11-06 | Stop reason: HOSPADM

## 2021-10-29 RX ORDER — MULTIPLE VITAMINS W/ MINERALS TAB 9MG-400MCG
1 TAB ORAL DAILY
Status: DISCONTINUED | OUTPATIENT
Start: 2021-10-29 | End: 2021-10-29 | Stop reason: SDUPTHER

## 2021-10-29 RX ORDER — SODIUM CHLORIDE 0.9 % (FLUSH) 0.9 %
10 SYRINGE (ML) INJECTION AS NEEDED
Status: DISCONTINUED | OUTPATIENT
Start: 2021-10-29 | End: 2021-11-06 | Stop reason: HOSPADM

## 2021-10-29 RX ORDER — ONDANSETRON 2 MG/ML
4 INJECTION INTRAMUSCULAR; INTRAVENOUS ONCE
Status: COMPLETED | OUTPATIENT
Start: 2021-10-29 | End: 2021-10-29

## 2021-10-29 RX ORDER — ONDANSETRON 2 MG/ML
4 INJECTION INTRAMUSCULAR; INTRAVENOUS EVERY 6 HOURS PRN
Status: DISCONTINUED | OUTPATIENT
Start: 2021-10-29 | End: 2021-11-06 | Stop reason: HOSPADM

## 2021-10-29 RX ORDER — ACETAMINOPHEN 325 MG/1
650 TABLET ORAL EVERY 4 HOURS PRN
Status: DISCONTINUED | OUTPATIENT
Start: 2021-10-29 | End: 2021-11-06 | Stop reason: HOSPADM

## 2021-10-29 RX ORDER — UREA 10 %
3 LOTION (ML) TOPICAL NIGHTLY PRN
Status: DISCONTINUED | OUTPATIENT
Start: 2021-10-29 | End: 2021-11-06 | Stop reason: HOSPADM

## 2021-10-29 RX ORDER — ONDANSETRON 4 MG/1
4 TABLET, FILM COATED ORAL EVERY 6 HOURS PRN
Status: DISCONTINUED | OUTPATIENT
Start: 2021-10-29 | End: 2021-11-06 | Stop reason: HOSPADM

## 2021-10-29 RX ORDER — PANTOPRAZOLE SODIUM 40 MG/1
40 TABLET, DELAYED RELEASE ORAL
Status: DISCONTINUED | OUTPATIENT
Start: 2021-10-30 | End: 2021-11-06 | Stop reason: HOSPADM

## 2021-10-29 RX ORDER — HYDROCODONE BITARTRATE AND ACETAMINOPHEN 7.5; 325 MG/1; MG/1
1 TABLET ORAL EVERY 6 HOURS PRN
Status: DISCONTINUED | OUTPATIENT
Start: 2021-10-29 | End: 2021-11-04

## 2021-10-29 RX ORDER — NITROGLYCERIN 0.4 MG/1
0.4 TABLET SUBLINGUAL
Status: DISCONTINUED | OUTPATIENT
Start: 2021-10-29 | End: 2021-11-06 | Stop reason: HOSPADM

## 2021-10-29 RX ORDER — SUCRALFATE 1 G/1
1 TABLET ORAL
Status: DISCONTINUED | OUTPATIENT
Start: 2021-10-30 | End: 2021-11-06 | Stop reason: HOSPADM

## 2021-10-29 RX ADMIN — SODIUM CHLORIDE 1000 ML: 9 INJECTION, SOLUTION INTRAVENOUS at 10:18

## 2021-10-29 RX ADMIN — ONDANSETRON 4 MG: 2 INJECTION INTRAMUSCULAR; INTRAVENOUS at 10:19

## 2021-10-29 RX ADMIN — VANCOMYCIN HYDROCHLORIDE 1750 MG: 10 INJECTION, POWDER, LYOPHILIZED, FOR SOLUTION INTRAVENOUS at 21:47

## 2021-10-29 RX ADMIN — THIAMINE HYDROCHLORIDE 100 MG: 100 INJECTION, SOLUTION INTRAMUSCULAR; INTRAVENOUS at 10:20

## 2021-10-29 RX ADMIN — QUETIAPINE FUMARATE 300 MG: 100 TABLET ORAL at 21:47

## 2021-10-29 RX ADMIN — MULTIPLE VITAMINS W/ MINERALS TAB 1 TABLET: TAB at 10:22

## 2021-10-29 RX ADMIN — APIXABAN 5 MG: 5 TABLET, FILM COATED ORAL at 21:47

## 2021-10-29 RX ADMIN — IOPAMIDOL 85 ML: 612 INJECTION, SOLUTION INTRAVENOUS at 11:42

## 2021-10-29 RX ADMIN — CEFTRIAXONE 1 G: 1 INJECTION, POWDER, FOR SOLUTION INTRAMUSCULAR; INTRAVENOUS at 14:07

## 2021-10-29 RX ADMIN — Medication 1 TABLET: at 21:47

## 2021-10-29 RX ADMIN — SODIUM CHLORIDE, PRESERVATIVE FREE 10 ML: 5 INJECTION INTRAVENOUS at 21:47

## 2021-10-30 PROBLEM — E66.3 OVERWEIGHT: Status: ACTIVE | Noted: 2021-10-30

## 2021-10-30 PROBLEM — F19.90 SUBSTANCE USE DISORDER: Status: ACTIVE | Noted: 2021-10-30

## 2021-10-30 PROBLEM — E87.6 HYPOKALEMIA: Status: ACTIVE | Noted: 2021-10-30

## 2021-10-30 LAB
ANION GAP SERPL CALCULATED.3IONS-SCNC: 7.7 MMOL/L (ref 5–15)
BUN SERPL-MCNC: 10 MG/DL (ref 6–20)
BUN/CREAT SERPL: 13.5 (ref 7–25)
CALCIUM SPEC-SCNC: 7.9 MG/DL (ref 8.6–10.5)
CHLORIDE SERPL-SCNC: 101 MMOL/L (ref 98–107)
CO2 SERPL-SCNC: 26.3 MMOL/L (ref 22–29)
CREAT SERPL-MCNC: 0.74 MG/DL (ref 0.76–1.27)
DEPRECATED RDW RBC AUTO: 44.4 FL (ref 37–54)
ERYTHROCYTE [DISTWIDTH] IN BLOOD BY AUTOMATED COUNT: 13.5 % (ref 12.3–15.4)
GFR SERPL CREATININE-BSD FRML MDRD: 110 ML/MIN/1.73
GLUCOSE SERPL-MCNC: 115 MG/DL (ref 65–99)
HCT VFR BLD AUTO: 33.9 % (ref 37.5–51)
HGB BLD-MCNC: 10.8 G/DL (ref 13–17.7)
MCH RBC QN AUTO: 28.5 PG (ref 26.6–33)
MCHC RBC AUTO-ENTMCNC: 31.9 G/DL (ref 31.5–35.7)
MCV RBC AUTO: 89.4 FL (ref 79–97)
PHOSPHATE SERPL-MCNC: 1.4 MG/DL (ref 2.5–4.5)
PLATELET # BLD AUTO: 375 10*3/MM3 (ref 140–450)
PMV BLD AUTO: 10.4 FL (ref 6–12)
POTASSIUM SERPL-SCNC: 3.4 MMOL/L (ref 3.5–5.2)
PROCALCITONIN SERPL-MCNC: 0.33 NG/ML (ref 0–0.25)
RBC # BLD AUTO: 3.79 10*6/MM3 (ref 4.14–5.8)
SODIUM SERPL-SCNC: 135 MMOL/L (ref 136–145)
WBC # BLD AUTO: 13.13 10*3/MM3 (ref 3.4–10.8)

## 2021-10-30 PROCEDURE — 84100 ASSAY OF PHOSPHORUS: CPT | Performed by: STUDENT IN AN ORGANIZED HEALTH CARE EDUCATION/TRAINING PROGRAM

## 2021-10-30 PROCEDURE — 85027 COMPLETE CBC AUTOMATED: CPT | Performed by: INTERNAL MEDICINE

## 2021-10-30 PROCEDURE — 90791 PSYCH DIAGNOSTIC EVALUATION: CPT | Performed by: SOCIAL WORKER

## 2021-10-30 PROCEDURE — 80048 BASIC METABOLIC PNL TOTAL CA: CPT | Performed by: INTERNAL MEDICINE

## 2021-10-30 PROCEDURE — 25010000002 CEFTRIAXONE PER 250 MG: Performed by: INTERNAL MEDICINE

## 2021-10-30 PROCEDURE — G0378 HOSPITAL OBSERVATION PER HR: HCPCS

## 2021-10-30 PROCEDURE — 25010000002 VANCOMYCIN PER 500 MG: Performed by: INTERNAL MEDICINE

## 2021-10-30 RX ORDER — POTASSIUM CHLORIDE 750 MG/1
40 TABLET, FILM COATED, EXTENDED RELEASE ORAL AS NEEDED
Status: DISCONTINUED | OUTPATIENT
Start: 2021-10-30 | End: 2021-11-06 | Stop reason: HOSPADM

## 2021-10-30 RX ORDER — POTASSIUM CHLORIDE 1.5 G/1.77G
40 POWDER, FOR SOLUTION ORAL AS NEEDED
Status: DISCONTINUED | OUTPATIENT
Start: 2021-10-30 | End: 2021-11-06 | Stop reason: HOSPADM

## 2021-10-30 RX ORDER — MAGNESIUM SULFATE HEPTAHYDRATE 40 MG/ML
4 INJECTION, SOLUTION INTRAVENOUS AS NEEDED
Status: DISCONTINUED | OUTPATIENT
Start: 2021-10-30 | End: 2021-11-06 | Stop reason: HOSPADM

## 2021-10-30 RX ORDER — MAGNESIUM SULFATE HEPTAHYDRATE 40 MG/ML
2 INJECTION, SOLUTION INTRAVENOUS AS NEEDED
Status: DISCONTINUED | OUTPATIENT
Start: 2021-10-30 | End: 2021-11-06 | Stop reason: HOSPADM

## 2021-10-30 RX ADMIN — POTASSIUM CHLORIDE 40 MEQ: 750 TABLET, EXTENDED RELEASE ORAL at 16:48

## 2021-10-30 RX ADMIN — PANTOPRAZOLE SODIUM 40 MG: 40 TABLET, DELAYED RELEASE ORAL at 10:45

## 2021-10-30 RX ADMIN — APIXABAN 5 MG: 5 TABLET, FILM COATED ORAL at 20:50

## 2021-10-30 RX ADMIN — POTASSIUM CHLORIDE 40 MEQ: 750 TABLET, EXTENDED RELEASE ORAL at 20:50

## 2021-10-30 RX ADMIN — SUCRALFATE 1 G: 1 TABLET ORAL at 10:45

## 2021-10-30 RX ADMIN — QUETIAPINE FUMARATE 300 MG: 100 TABLET ORAL at 20:50

## 2021-10-30 RX ADMIN — VANCOMYCIN HYDROCHLORIDE 1000 MG: 1 INJECTION, SOLUTION INTRAVENOUS at 10:45

## 2021-10-30 RX ADMIN — CEFTRIAXONE 1 G: 1 INJECTION, POWDER, FOR SOLUTION INTRAMUSCULAR; INTRAVENOUS at 14:40

## 2021-10-30 RX ADMIN — PANTOPRAZOLE SODIUM 40 MG: 40 TABLET, DELAYED RELEASE ORAL at 16:48

## 2021-10-30 RX ADMIN — SODIUM CHLORIDE 1000 ML: 9 INJECTION, SOLUTION INTRAVENOUS at 16:48

## 2021-10-30 RX ADMIN — APIXABAN 5 MG: 5 TABLET, FILM COATED ORAL at 10:45

## 2021-10-30 RX ADMIN — SUCRALFATE 1 G: 1 TABLET ORAL at 16:48

## 2021-10-31 PROBLEM — Z59.00 HOMELESS: Status: ACTIVE | Noted: 2021-10-31

## 2021-10-31 PROBLEM — F31.9 BIPOLAR DISORDER: Status: ACTIVE | Noted: 2021-10-31

## 2021-10-31 PROBLEM — E83.39 HYPOPHOSPHATEMIA: Status: ACTIVE | Noted: 2021-10-31

## 2021-10-31 PROBLEM — B37.2 INTERTRIGINOUS CANDIDIASIS: Status: ACTIVE | Noted: 2021-10-31

## 2021-10-31 PROCEDURE — 97530 THERAPEUTIC ACTIVITIES: CPT

## 2021-10-31 PROCEDURE — 25010000002 CEFTRIAXONE PER 250 MG: Performed by: STUDENT IN AN ORGANIZED HEALTH CARE EDUCATION/TRAINING PROGRAM

## 2021-10-31 PROCEDURE — 97162 PT EVAL MOD COMPLEX 30 MIN: CPT

## 2021-10-31 RX ORDER — BISACODYL 10 MG
10 SUPPOSITORY, RECTAL RECTAL DAILY PRN
Status: DISCONTINUED | OUTPATIENT
Start: 2021-10-31 | End: 2021-11-06 | Stop reason: HOSPADM

## 2021-10-31 RX ORDER — POLYETHYLENE GLYCOL 3350 17 G/17G
17 POWDER, FOR SOLUTION ORAL DAILY PRN
Status: DISCONTINUED | OUTPATIENT
Start: 2021-10-31 | End: 2021-11-06 | Stop reason: HOSPADM

## 2021-10-31 RX ORDER — AMOXICILLIN 250 MG
2 CAPSULE ORAL DAILY
Status: DISCONTINUED | OUTPATIENT
Start: 2021-10-31 | End: 2021-11-06 | Stop reason: HOSPADM

## 2021-10-31 RX ORDER — KETOCONAZOLE 20 MG/G
1 CREAM TOPICAL
Status: DISCONTINUED | OUTPATIENT
Start: 2021-10-31 | End: 2021-11-06 | Stop reason: HOSPADM

## 2021-10-31 RX ADMIN — APIXABAN 5 MG: 5 TABLET, FILM COATED ORAL at 08:33

## 2021-10-31 RX ADMIN — Medication 2 PACKET: at 00:29

## 2021-10-31 RX ADMIN — Medication 1 TABLET: at 08:33

## 2021-10-31 RX ADMIN — APIXABAN 5 MG: 5 TABLET, FILM COATED ORAL at 20:42

## 2021-10-31 RX ADMIN — SUCRALFATE 1 G: 1 TABLET ORAL at 17:46

## 2021-10-31 RX ADMIN — SUCRALFATE 1 G: 1 TABLET ORAL at 06:32

## 2021-10-31 RX ADMIN — PANTOPRAZOLE SODIUM 40 MG: 40 TABLET, DELAYED RELEASE ORAL at 06:32

## 2021-10-31 RX ADMIN — QUETIAPINE FUMARATE 300 MG: 100 TABLET ORAL at 20:42

## 2021-10-31 RX ADMIN — DOCUSATE SODIUM 50MG AND SENNOSIDES 8.6MG 2 TABLET: 8.6; 5 TABLET, FILM COATED ORAL at 17:46

## 2021-10-31 RX ADMIN — PANTOPRAZOLE SODIUM 40 MG: 40 TABLET, DELAYED RELEASE ORAL at 17:46

## 2021-10-31 RX ADMIN — CEFTRIAXONE 1 G: 1 INJECTION, POWDER, FOR SOLUTION INTRAMUSCULAR; INTRAVENOUS at 17:47

## 2021-11-01 LAB
ALBUMIN SERPL-MCNC: 3.7 G/DL (ref 3.5–5.2)
ALBUMIN/GLOB SERPL: 1.2 G/DL
ALP SERPL-CCNC: 69 U/L (ref 39–117)
ALT SERPL W P-5'-P-CCNC: 21 U/L (ref 1–41)
ANION GAP SERPL CALCULATED.3IONS-SCNC: 9.4 MMOL/L (ref 5–15)
AST SERPL-CCNC: 24 U/L (ref 1–40)
BASOPHILS # BLD AUTO: 0.04 10*3/MM3 (ref 0–0.2)
BASOPHILS NFR BLD AUTO: 0.4 % (ref 0–1.5)
BILIRUB SERPL-MCNC: 0.2 MG/DL (ref 0–1.2)
BUN SERPL-MCNC: 10 MG/DL (ref 6–20)
BUN/CREAT SERPL: 9.5 (ref 7–25)
CALCIUM SPEC-SCNC: 9 MG/DL (ref 8.6–10.5)
CHLORIDE SERPL-SCNC: 106 MMOL/L (ref 98–107)
CO2 SERPL-SCNC: 23.6 MMOL/L (ref 22–29)
CREAT SERPL-MCNC: 1.05 MG/DL (ref 0.76–1.27)
DEPRECATED RDW RBC AUTO: 42.2 FL (ref 37–54)
EOSINOPHIL # BLD AUTO: 0.64 10*3/MM3 (ref 0–0.4)
EOSINOPHIL NFR BLD AUTO: 6 % (ref 0.3–6.2)
ERYTHROCYTE [DISTWIDTH] IN BLOOD BY AUTOMATED COUNT: 13.4 % (ref 12.3–15.4)
GFR SERPL CREATININE-BSD FRML MDRD: 74 ML/MIN/1.73
GLOBULIN UR ELPH-MCNC: 3 GM/DL
GLUCOSE SERPL-MCNC: 124 MG/DL (ref 65–99)
HCT VFR BLD AUTO: 34.9 % (ref 37.5–51)
HGB BLD-MCNC: 11.9 G/DL (ref 13–17.7)
IMM GRANULOCYTES # BLD AUTO: 0.06 10*3/MM3 (ref 0–0.05)
IMM GRANULOCYTES NFR BLD AUTO: 0.6 % (ref 0–0.5)
LYMPHOCYTES # BLD AUTO: 2.01 10*3/MM3 (ref 0.7–3.1)
LYMPHOCYTES NFR BLD AUTO: 18.9 % (ref 19.6–45.3)
MAGNESIUM SERPL-MCNC: 2 MG/DL (ref 1.6–2.6)
MCH RBC QN AUTO: 29.9 PG (ref 26.6–33)
MCHC RBC AUTO-ENTMCNC: 34.1 G/DL (ref 31.5–35.7)
MCV RBC AUTO: 87.7 FL (ref 79–97)
MONOCYTES # BLD AUTO: 1.21 10*3/MM3 (ref 0.1–0.9)
MONOCYTES NFR BLD AUTO: 11.4 % (ref 5–12)
NEUTROPHILS NFR BLD AUTO: 6.66 10*3/MM3 (ref 1.7–7)
NEUTROPHILS NFR BLD AUTO: 62.7 % (ref 42.7–76)
NRBC BLD AUTO-RTO: 0 /100 WBC (ref 0–0.2)
PHOSPHATE SERPL-MCNC: 2.8 MG/DL (ref 2.5–4.5)
PLATELET # BLD AUTO: 486 10*3/MM3 (ref 140–450)
PMV BLD AUTO: 10.7 FL (ref 6–12)
POTASSIUM SERPL-SCNC: 4 MMOL/L (ref 3.5–5.2)
PROT SERPL-MCNC: 6.7 G/DL (ref 6–8.5)
RBC # BLD AUTO: 3.98 10*6/MM3 (ref 4.14–5.8)
SODIUM SERPL-SCNC: 139 MMOL/L (ref 136–145)
WBC # BLD AUTO: 10.62 10*3/MM3 (ref 3.4–10.8)

## 2021-11-01 PROCEDURE — 97530 THERAPEUTIC ACTIVITIES: CPT

## 2021-11-01 PROCEDURE — 85025 COMPLETE CBC W/AUTO DIFF WBC: CPT | Performed by: STUDENT IN AN ORGANIZED HEALTH CARE EDUCATION/TRAINING PROGRAM

## 2021-11-01 PROCEDURE — 83735 ASSAY OF MAGNESIUM: CPT | Performed by: STUDENT IN AN ORGANIZED HEALTH CARE EDUCATION/TRAINING PROGRAM

## 2021-11-01 PROCEDURE — 25010000002 CEFAZOLIN 1-4 GM/50ML-% SOLUTION: Performed by: STUDENT IN AN ORGANIZED HEALTH CARE EDUCATION/TRAINING PROGRAM

## 2021-11-01 PROCEDURE — 80053 COMPREHEN METABOLIC PANEL: CPT | Performed by: STUDENT IN AN ORGANIZED HEALTH CARE EDUCATION/TRAINING PROGRAM

## 2021-11-01 PROCEDURE — 87147 CULTURE TYPE IMMUNOLOGIC: CPT | Performed by: STUDENT IN AN ORGANIZED HEALTH CARE EDUCATION/TRAINING PROGRAM

## 2021-11-01 PROCEDURE — 97165 OT EVAL LOW COMPLEX 30 MIN: CPT

## 2021-11-01 PROCEDURE — 87040 BLOOD CULTURE FOR BACTERIA: CPT | Performed by: STUDENT IN AN ORGANIZED HEALTH CARE EDUCATION/TRAINING PROGRAM

## 2021-11-01 PROCEDURE — 84100 ASSAY OF PHOSPHORUS: CPT | Performed by: STUDENT IN AN ORGANIZED HEALTH CARE EDUCATION/TRAINING PROGRAM

## 2021-11-01 PROCEDURE — 87150 DNA/RNA AMPLIFIED PROBE: CPT | Performed by: STUDENT IN AN ORGANIZED HEALTH CARE EDUCATION/TRAINING PROGRAM

## 2021-11-01 RX ORDER — CEFAZOLIN SODIUM 1 G/50ML
1 INJECTION, SOLUTION INTRAVENOUS EVERY 8 HOURS
Status: DISCONTINUED | OUTPATIENT
Start: 2021-11-01 | End: 2021-11-02

## 2021-11-01 RX ADMIN — NYSTATIN 1 APPLICATION: 100000 OINTMENT TOPICAL at 20:21

## 2021-11-01 RX ADMIN — KETOCONAZOLE 1 APPLICATION: 20 CREAM TOPICAL at 10:34

## 2021-11-01 RX ADMIN — CEFAZOLIN SODIUM 1 G: 1 INJECTION, SOLUTION INTRAVENOUS at 17:01

## 2021-11-01 RX ADMIN — QUETIAPINE FUMARATE 300 MG: 100 TABLET ORAL at 20:21

## 2021-11-01 RX ADMIN — PANTOPRAZOLE SODIUM 40 MG: 40 TABLET, DELAYED RELEASE ORAL at 17:01

## 2021-11-01 RX ADMIN — PANTOPRAZOLE SODIUM 40 MG: 40 TABLET, DELAYED RELEASE ORAL at 06:44

## 2021-11-01 RX ADMIN — APIXABAN 5 MG: 5 TABLET, FILM COATED ORAL at 10:34

## 2021-11-01 RX ADMIN — SUCRALFATE 1 G: 1 TABLET ORAL at 06:44

## 2021-11-01 RX ADMIN — APIXABAN 5 MG: 5 TABLET, FILM COATED ORAL at 20:21

## 2021-11-01 RX ADMIN — SUCRALFATE 1 G: 1 TABLET ORAL at 17:01

## 2021-11-01 NOTE — PROGRESS NOTES
Name: David Villalta ADMIT: 10/29/2021   : 1965  PCP: Provider, No Known    MRN: [id number] LOS: 1 days   AGE/SEX: 56 y.o. male  ROOM: E665/1     Subjective   Subjective   Pain is improving.  Rash is improving.  Appetite good.  No new complaints today.  He is amenable to a rehab if this can be arranged    Review of Systems  Denies chest pain, nausea, vomiting, abdominal pain, lower extremity swelling     Objective   Objective   Vital Signs  Temp:  [97.8 °F (36.6 °C)-99.3 °F (37.4 °C)] 98.1 °F (36.7 °C)  Heart Rate:  [74-81] 74  Resp:  [16-18] 18  BP: (115-132)/(72-81) 124/72  SpO2:  [92 %-93 %] 92 %  on   ;   Device (Oxygen Therapy): room air  Body mass index is 27.43 kg/m².  Physical Exam  Constitutional:       General: He is not in acute distress.     Appearance: He is not ill-appearing or diaphoretic.   HENT:      Mouth/Throat:      Mouth: Mucous membranes are moist.   Eyes:      General: No scleral icterus.  Cardiovascular:      Rate and Rhythm: Normal rate and regular rhythm.      Heart sounds: No murmur heard.  No gallop.    Pulmonary:      Effort: Pulmonary effort is normal. No respiratory distress.      Breath sounds: No wheezing or rhonchi.   Abdominal:      General: There is no distension.      Palpations: Abdomen is soft. There is no mass.      Tenderness: There is no abdominal tenderness. There is no guarding.   Skin:     Comments: Intertriginous erythema of the groin   Neurological:      Mental Status: He is alert.         Results Review     I reviewed the patient's new clinical results.  Results from last 7 days   Lab Units 21  0720 10/30/21  0748 10/29/21  0846   WBC 10*3/mm3 10.62 13.13* 17.86*   HEMOGLOBIN g/dL 11.9* 10.8* 13.4   PLATELETS 10*3/mm3 486* 375 424     Results from last 7 days   Lab Units 21  0720 10/30/21  0748 10/29/21  0846   SODIUM mmol/L 139 135* 134*   POTASSIUM mmol/L 4.0 3.4* 4.1   CHLORIDE mmol/L 106 101 94*   CO2 mmol/L 23.6 26.3 26.8   BUN mg/dL 10 10 42*    CREATININE mg/dL 1.05 0.74* 0.97   GLUCOSE mg/dL 124* 115* 118*   Estimated Creatinine Clearance: 98.6 mL/min (by C-G formula based on SCr of 1.05 mg/dL).  Results from last 7 days   Lab Units 11/01/21  0720 10/29/21  0846   ALBUMIN g/dL 3.70 4.60   BILIRUBIN mg/dL 0.2 0.9   ALK PHOS U/L 69 93   AST (SGOT) U/L 24 56*   ALT (SGPT) U/L 21 32     Results from last 7 days   Lab Units 11/01/21  0720 10/30/21  0748 10/29/21  0846   CALCIUM mg/dL 9.0 7.9* 9.1   ALBUMIN g/dL 3.70  --  4.60   MAGNESIUM mg/dL 2.0  --  2.7*   PHOSPHORUS mg/dL 2.8 1.4*  --      Results from last 7 days   Lab Units 10/29/21  2140   PROCALCITONIN ng/mL 0.33*     COVID19   Date Value Ref Range Status   10/29/2021 Not Detected Not Detected - Ref. Range Final   09/13/2021 Not Detected Not Detected - Ref. Range Final     No results found for: HGBA1C, POCGLU    CT Abdomen Pelvis With Contrast  CT ABDOMEN PELVIS W CONTRAST-     Radiation dose reduction techniques were utilized, including automated  exposure control and exposure modulation based on body size.     Clinical: 56-year-old male with upper abdominal pain     COMPARISON CT of the abdomen and pelvis 9/13/2021     FINDINGS: There is again wall thickening of the distal esophagus however  the amount has diminished since 9/13/2021. The liver, gallbladder and  pancreas are satisfactory in appearance, no biliary duct dilatation.  Both adrenal glands are normal. The spleen has been removed.     There is a tiny right renal cortical cysts, the kidneys are otherwise  normal, no calculus nor obstructive uropathy. The urinary bladder is  satisfactory in appearance. Prostate and seminal vesicles within normal  limits.     The appendix, colon and small bowel are satisfactory in appearance,  stomach within normal limits. No duodenal abnormality seen. Diameter of  the aorta is within normal limits.     CONCLUSION:  1. There is thickening of the distal esophagus however the degree has  considerably improved  since 9/13/2021.  2. No acute intra-abdominal abnormality.        This report was finalized on 10/29/2021 12:22 PM by Dr. Uzair Ortega M.D.     XR Chest 1 View  Narrative: CHEST SINGLE VIEW     HISTORY: Abdominal pain for 2 days. Drug use.     COMPARISON: None     FINDINGS: The heart and mediastinal structures are within normal limits.  Lungs appear clear and there is no evidence for pulmonary edema or  pleural effusion or infiltrate.Cardiac monitoring leads are noted.     Impression: No evidence for active disease in the chest.     This report was finalized on 10/29/2021 10:25 AM by Dr. Vishal Barber M.D.       I reviewed the patient's daily medications.  Scheduled Medications  apixaban, 5 mg, Oral, Q12H  ceFAZolin, 1 g, Intravenous, Q8H  ketoconazole, 1 application, Topical, Q24H  multivitamin with minerals, 1 tablet, Oral, Daily  pantoprazole, 40 mg, Oral, BID AC  QUEtiapine, 300 mg, Oral, Nightly  senna-docusate sodium, 2 tablet, Oral, Daily  sucralfate, 1 g, Oral, TID AC    Infusions   Diet  Diet Soft Texture; Ground; Thin       Assessment/Plan     Active Hospital Problems    Diagnosis  POA   • **Cellulitis [L03.90]  Yes   • Hypophosphatemia [E83.39]  Yes   • Intertriginous candidiasis [B37.2]  Yes   • Bipolar disorder (HCC) [F31.9]  Yes   • Homeless [Z59.00]  Not Applicable   • Substance use disorder [F19.90]  Yes   • Overweight [E66.3]  Yes   • Hypokalemia [E87.6]  Yes   • Hyponatremia [E87.1]  Yes   • History of DVT (deep vein thrombosis) [Z86.718]  Not Applicable      Resolved Hospital Problems   No resolved problems to display.       56 y.o. male with history of polysubstance use who presented for acute onset abdominal pain.  CT abdomen on admission did not show any acute pathology.  He was admitted with sepsis due to nonpurulent cellulitis.    Sepsis due to nonpurulent cellulitis  -he may have an overlying bacterial cellulitis on top of intertriginous candidal infection of his groin.  He had  elevated temperatures on admission (T-max 100.2) and leukocytosis. Will treat for 7 days (end 11/4).  De-escalate to cefazolin. Change to oral on discharge.   -Blood cultures pending    Intertriginous candida, groin  -Continue ketoconazole for 14 days (end 11/13).  Wound care consult.  It seems to be improving    Polysubstance use disorder  -UDS positive for amphetamines.  [facility]  -Access consulted  -Monitored on CIWA, no signs or symptoms of withdrawal    Bipolar disorder  -Continue nightly Seroquel    Hypokalemia, hypophosphatemia: Due to overall poor nutritional status.  Replace per protocol.    Hyponatremia: Likely due to recent poor p.o. intake.  Resolved  History of DVT: Home Eliquis      · Eliquis (home med) for DVT prophylaxis.  · Full code.  · Discussed with patient.  · Anticipate discharge home tomorrow.  PT has recommended SNF. He is homeless; he would benefit from rehab if he is willing and an accepting facility can be found.  Will follow up with       Awa Combs, [facility]  11/01/21  13:38 EDT    Patient was placed in face mask on first look.  I wore protective equipment throughout this patient encounter including a face mask, gloves and protective eyewear.  Hand hygiene was performed before donning protective equipment and after removal when leaving the room.

## 2021-11-01 NOTE — PROGRESS NOTES
The patient is in bright spirits today and offers no new complaints.  He is, from a psychiatric standpoint at his baseline, it is his understanding that he will be transferred to a rehab facility for continued treatment of his lower extremity cellulitis following discharge.  I will plan to sign off, but can see the patient again if needed.

## 2021-11-01 NOTE — DISCHARGE PLACEMENT REQUEST
"Lily Paz (56 y.o. Male)             Date of Birth Social Security Number Address Home Phone MRN    1965  [address] [facility] [address] [phone] [id number]    Mormonism Marital Status             None Single       Admission Date Admission Type Admitting Provider Attending Provider Department, Room/Bed    10/29/21 Emergency Stingl, MD Garret Deutsch Katherine, DO [address], [address]    Discharge Date Discharge Disposition Discharge Destination                         Attending Provider: Awa Combs DO    Allergies: Penicillin G    Isolation: Contact   Infection: MRSA (10/29/21)   Code Status: CPR   Advance Care Planning Activity    Ht: 177.8 cm (70\")   Wt: 86.7 kg (191 lb 3.2 oz)    Admission Cmt: None   Principal Problem: Cellulitis [L03.90]                 Active Insurance as of 10/29/2021     Primary Coverage     Payor Plan Insurance Group Employer/Plan Group    [employer] [place] [employer] [id number]     Payor Plan Address Payor Plan Phone Number Payor Plan Fax Number Effective Dates    [address] [address] [phone]  9/1/2020 - None Entered    [facility] [address]       Subscriber Name Subscriber Birth Date Member ID       LILY PAZ 1965 [id number]                 Emergency Contacts      (Rel.) Home Phone Work Phone Mobile Phone    Xiang Prakash (Guardian) -- -- [phone]            "

## 2021-11-01 NOTE — PAYOR COMM NOTE
"Lily Paz (56 y.o. Male)     ATTN: INPATIENT NOTIFICATION:  [id number]     DEPT: [phone],  [phone]              Date of Birth Social Security Number Address Home Phone MRN    1965  [address] [facility] [address] [phone] [id number]    Denominational Marital Status             None Single       Admission Date Admission Type Admitting Provider Attending Provider Department, Room/Bed    10/29/21 Emergency Karen Hernandez MD George, Katherine, DO [facility] 6 [facility], E665/1    Discharge Date Discharge Disposition Discharge Destination                         Attending Provider: Awa Combs DO    Allergies: Penicillin G    Isolation: Contact   Infection: MRSA (10/29/21)   Code Status: CPR   Advance Care Planning Activity    Ht: 177.8 cm (70\")   Wt: 86.7 kg (191 lb 3.2 oz)    Admission Cmt: None   Principal Problem: Cellulitis [L03.90]                 Active Insurance as of 10/29/2021     Primary Coverage     Payor Plan Insurance Group Employer/Plan Group    ANTHEM MEDICAID ANTH MEDICAID [id number]     Payor Plan Address Payor Plan Phone Number Payor Plan Fax Number Effective Dates    [address] [address] [phone]  2020 - None Entered    [facility] [id number]       Subscriber Name Subscriber Birth Date Member ID       LILY PAZ 1965 [id number]                 Emergency Contacts      (Rel.) Home Phone Work Phone Mobile Phone    Xiang Prakash (Guardian) -- -- [phone]               History & Physical      Karen Hernandez MD at 10/29/21 1946          HISTORY AND PHYSICAL   [facility]        Date of Admission: 10/29/2021  Patient Identification:  Name: Lily Paz  Age: 56 y.o.  Sex: male  :  1965  MRN: [id number]                     Primary Care Physician: Provider, No Known    Chief Complaint:  56 year old gentleman who came to the emergency room with abdominal pain which started this morning; " he denies fever or chills; he was in rehab for drug abuse at [facility] and was recently discharged; he has been using drugs since he was released and has been too somnolent to go to the bathroom so he has been soiled with urine and feces for days; he has been excoriated and complains of pain of his groin    History of Present Illness:   As above    Past Medical History:  Past Medical History:   Diagnosis Date   • Anxiety    • Bipolar disorder (HCC)    • Depression    • Hepatitis C    • Hx of drug abuse (HCC)    • Hyponatremia 9/13/2021   • Leukocytosis 9/13/2021   • Pulmonary embolism (HCC)      Past Surgical History:  Past Surgical History:   Procedure Laterality Date   • ANKLE SURGERY     • ENDOSCOPY N/A 9/14/2021    Procedure: ESOPHAGOGASTRODUODENOSCOPY WITH COLD BIOPSIES;  Surgeon: Allyson Magaña MD;  Location: [facility] ENDOSCOPY;  Service: Gastroenterology;  Laterality: N/A;  PRE: HEMETEMESIS,  ESOPHAGEAL THICKENING  POST:ESOPHAGITIS, GASTRITIS, DUODENITIS, EROSION   • SPLENECTOMY        Home Meds:  Medications Prior to Admission   Medication Sig Dispense Refill Last Dose   • QUEtiapine (SEROquel) 300 MG tablet Take 1 tablet by mouth Every Night. 30 tablet 0    • apixaban (ELIQUIS) 5 MG tablet tablet Take 1 tablet by mouth Every 12 (Twelve) Hours. 60 tablet 0    • multivitamin with minerals (MULTIVITAMIN ADULTS PO) Take 1 tablet by mouth Daily. 30 each 0    • pantoprazole (PROTONIX) 40 MG EC tablet Take 1 tablet by mouth 2 (Two) Times a Day Before Meals. 60 tablet 0    • sucralfate (CARAFATE) 1 g tablet Take 1 tablet by mouth 3 (Three) Times a Day Before Meals. 90 tablet 0        Allergies:  Allergies   Allergen Reactions   • Penicillin G Unknown - Low Severity     Immunizations:  Immunization History   Administered Date(s) Administered   • Hepatitis A 06/06/2002, 05/17/2018   • Influenza, Unspecified 09/11/2018   • Tdap 01/09/2015, 06/03/2018   • Tetanus Toxoid, Unspecified 05/08/2012     Social History:   Social  "History     Social History Narrative   • Not on file     Social History     Socioeconomic History   • Marital status: Single   Tobacco Use   • Smoking status: Current Every Day Smoker     Packs/day: 0.50     Types: Cigarettes   • Smokeless tobacco: Never Used   Vaping Use   • Vaping Use: Some days   • Substances: Nicotine   • Devices: Disposable   Substance and Sexual Activity   • Alcohol use: Yes     Comment: \"goes ball to the wall, I drink excessively\"   • Drug use: Yes     Types: IV, Cocaine(coke), Methamphetamines     Comment: \"meth\"   • Sexual activity: Defer       Family History:  History reviewed. No pertinent family history.     Review of Systems  See history of present illness and past medical history.  Patient denies headache, dizziness, syncope, falls, trauma, change in vision, change in hearing, change in taste, changes in weight, changes in appetite, focal weakness, numbness, or paresthesia.  Patient denies chest pain, palpitations, dyspnea, orthopnea, PND, cough, sinus pressure, rhinorrhea, epistaxis, hemoptysis, nausea, vomiting,hematemesis, diarrhea, constipation or hematchezia.  Denies cold or heat intolerance, polydipsia, polyuria, polyphagia. Denies hematuria, pyuria, dysuria, hesitancy, frequency or urgency. Denies consumption of raw and under cooked meats foods or change in water source.  Denies fever, chills, sweats, night sweats.  Denies missing any routine medications. Remainder of ROS is negative.    Objective:  T Max 24 hrs: Temp (24hrs), Av.5 °F (36.9 °C), Min:97.4 °F (36.3 °C), Max:99.2 °F (37.3 °C)    Vitals Ranges:   Temp:  [97.4 °F (36.3 °C)-99.2 °F (37.3 °C)] 97.4 °F (36.3 °C)  Heart Rate:  [] 94  Resp:  [15-18] 18  BP: (119-178)/(69-99) 120/69      Exam:  /69 (BP Location: Left arm, Patient Position: Lying)   Pulse 94   Temp 97.4 °F (36.3 °C) (Oral)   Resp 18   Ht 177.8 cm (70\")   Wt 88.7 kg (195 lb 8.8 oz)   SpO2 92%   BMI 28.06 kg/m²     General Appearance:   "  Alert, cooperative, no distress, appears stated age   Head:    Normocephalic, without obvious abnormality, atraumatic   Eyes:    PERRL, conjunctivae/corneas clear, EOM's intact, both eyes   Ears:    Normal external ear canals, both ears   Nose:   Nares normal, septum midline, mucosa normal, no drainage    or sinus tenderness   Throat:   Lips, mucosa, and tongue normal   Neck:   Supple, symmetrical, trachea midline, no adenopathy;     thyroid:  no enlargement/tenderness/nodules; no carotid    bruit or JVD   Back:     Symmetric, no curvature, ROM normal, no CVA tenderness   Lungs:     Decreased breath sounds bilaterally, respirations unlabored   Chest Wall:    No tenderness or deformity    Heart:    Regular rate and rhythm, S1 and S2 normal, no murmur, rub   or gallop   Abdomen:     Soft, nontender, bowel sounds active all four quadrants,     no masses, no hepatomegaly, no splenomegaly   Extremities:   Extremities normal, atraumatic, no cyanosis or edema   Pulses:   2+ and symmetric all extremities   Skin:   Skin color, texture, turgor normal, no rashes or lesions   Lymph nodes:   Cervical, supraclavicular, and axillary nodes normal   Neurologic:   CNII-XII intact, normal strength, sensation intact throughout      .    Data Review:  Labs in chart were reviewed.  WBC   Date Value Ref Range Status   10/29/2021 17.86 (H) 3.40 - 10.80 10*3/mm3 Final     Hemoglobin   Date Value Ref Range Status   10/29/2021 13.4 13.0 - 17.7 g/dL Final     Hematocrit   Date Value Ref Range Status   10/29/2021 39.3 37.5 - 51.0 % Final     Platelets   Date Value Ref Range Status   10/29/2021 424 140 - 450 10*3/mm3 Final     Sodium   Date Value Ref Range Status   10/29/2021 134 (L) 136 - 145 mmol/L Final     Potassium   Date Value Ref Range Status   10/29/2021 4.1 3.5 - 5.2 mmol/L Final     Chloride   Date Value Ref Range Status   10/29/2021 94 (L) 98 - 107 mmol/L Final     CO2   Date Value Ref Range Status   10/29/2021 26.8 22.0 - 29.0  mmol/L Final     BUN   Date Value Ref Range Status   10/29/2021 42 (H) 6 - 20 mg/dL Final     Creatinine   Date Value Ref Range Status   10/29/2021 0.97 0.76 - 1.27 mg/dL Final     Glucose   Date Value Ref Range Status   10/29/2021 118 (H) 65 - 99 mg/dL Final     Calcium   Date Value Ref Range Status   10/29/2021 9.1 8.6 - 10.5 mg/dL Final     Magnesium   Date Value Ref Range Status   10/29/2021 2.7 (H) 1.6 - 2.6 mg/dL Final     AST (SGOT)   Date Value Ref Range Status   10/29/2021 56 (H) 1 - 40 U/L Final     ALT (SGPT)   Date Value Ref Range Status   10/29/2021 32 1 - 41 U/L Final     Alkaline Phosphatase   Date Value Ref Range Status   10/29/2021 93 39 - 117 U/L Final                Imaging Results (All)     Procedure Component Value Units Date/Time    CT Abdomen Pelvis With Contrast [[id number]] Collected: 10/29/21 1217     Updated: 10/29/21 1225    Narrative:      CT ABDOMEN PELVIS W CONTRAST-     Radiation dose reduction techniques were utilized, including automated  exposure control and exposure modulation based on body size.     Clinical: 56-year-old male with upper abdominal pain     COMPARISON CT of the abdomen and pelvis 9/13/2021     FINDINGS: There is again wall thickening of the distal esophagus however  the amount has diminished since 9/13/2021. The liver, gallbladder and  pancreas are satisfactory in appearance, no biliary duct dilatation.  Both adrenal glands are normal. The spleen has been removed.     There is a tiny right renal cortical cysts, the kidneys are otherwise  normal, no calculus nor obstructive uropathy. The urinary bladder is  satisfactory in appearance. Prostate and seminal vesicles within normal  limits.     The appendix, colon and small bowel are satisfactory in appearance,  stomach within normal limits. No duodenal abnormality seen. Diameter of  the aorta is within normal limits.     CONCLUSION:  1. There is thickening of the distal esophagus however the degree has  considerably  improved since 9/13/2021.  2. No acute intra-abdominal abnormality.        This report was finalized on 10/29/2021 12:22 PM by Dr. Uzair Ortega M.D.       XR Chest 1 View [[id number]] Collected: 10/29/21 1019     Updated: 10/29/21 1028    Narrative:      CHEST SINGLE VIEW     HISTORY: Abdominal pain for 2 days. Drug use.     COMPARISON: None     FINDINGS: The heart and mediastinal structures are within normal limits.  Lungs appear clear and there is no evidence for pulmonary edema or  pleural effusion or infiltrate.Cardiac monitoring leads are noted.       Impression:      No evidence for active disease in the chest.     This report was finalized on 10/29/2021 10:25 AM by Dr. Vishal Barber M.D.           Patient Active Problem List   Diagnosis Code   • History of DVT (deep vein thrombosis) Z86.718   • Alcohol dependence (HCC) F10.20   • Esophagitis K20.90   • Gastritis and duodenitis K29.90   • Cellulitis L03.90       Assessment:    Cellulitis  substance abuse  Hyponatremia  Hyperglycemia  Hepatitis c  Bipolar disorder    Plan:  Will continue antibiotics  Local barrier creme  Await cultures  Trend wbc  Monitor blood sugar  D.w patient, nurse and ED Provider    Karen Hernandez MD  10/29/2021  19:46 EDT      Electronically signed by Karen Hernandez MD at 10/29/21 1951          Emergency Department Notes      Lluvia Hernandez RN at 10/29/21 0815        C/O lower Abd pain onset approx 1 hour ago. States walking causes severe pain, denies N/V/D    Mask placed on patient in triage. Triage staff wore appropriate PPE during interaction with patient.       Electronically signed by Lluvia Hernandez RN at 10/29/21 0817     Shameka Anders APRN at 10/29/21 0825     Attestation signed by Akbar Garvey MD at 10/29/21 1601          For this patient encounter, I reviewed the NP or PA documentation, treatment plan, and medical decision making. Akbar Garvey MD 10/29/2021 16:01 EDT        "            EMERGENCY DEPARTMENT ENCOUNTER    Room Number:  06/06  Date of encounter:  10/29/2021  PCP: Provider, No Known  Historian: patient   Full history not obtainable due to: none     HPI:  Chief Complaint: Abdominal pain     Context: David Villalta is a 56 y.o. male who presents to the ED c/o abdominal pain onset this am. Reports constant pain in the upper to mid abdomen. It is non radiating. Nothing worsens or improves it. Associated rash on his perineum and buttocks which believes is from soiling himself over the last 5 days. He was released from [facility] for drug abuse and has been using drugs and drinking alcohol since discharge. He states he has been using \"whatever I can get my hands on\" and \"too out of it\" to use the bathroom or bathe.   No vomiting  No fever.  No cough or cold symptoms.      PAST MEDICAL HISTORY    Active Ambulatory Problems     Diagnosis Date Noted   • History of DVT (deep vein thrombosis) 09/13/2021   • Alcohol dependence (HCC) 09/13/2021   • Esophagitis 09/15/2021   • Gastritis and duodenitis 09/15/2021     Resolved Ambulatory Problems     Diagnosis Date Noted   • Hematemesis with nausea 09/13/2021   • Esophageal thickening 09/13/2021   • Hyponatremia 09/13/2021   • Leukocytosis 09/13/2021     Past Medical History:   Diagnosis Date   • Anxiety    • Bipolar disorder (HCC)    • Depression    • Hepatitis C    • Hx of drug abuse (HCC)    • Pulmonary embolism (HCC)          PAST SURGICAL HISTORY  Past Surgical History:   Procedure Laterality Date   • ANKLE SURGERY     • ENDOSCOPY N/A 9/14/2021    Procedure: ESOPHAGOGASTRODUODENOSCOPY WITH COLD BIOPSIES;  Surgeon: Allyson Magaña MD;  Location: [facility] ENDOSCOPY;  Service: Gastroenterology;  Laterality: N/A;  PRE: HEMETEMESIS,  ESOPHAGEAL THICKENING  POST:ESOPHAGITIS, GASTRITIS, DUODENITIS, EROSION   • SPLENECTOMY           FAMILY HISTORY  History reviewed. No pertinent family history.      SOCIAL HISTORY  Social History     Socioeconomic " "History   • Marital status: Single   Tobacco Use   • Smoking status: Current Every Day Smoker     Packs/day: 0.50     Types: Cigarettes   • Smokeless tobacco: Never Used   Vaping Use   • Vaping Use: Some days   • Substances: Nicotine   • Devices: Disposable   Substance and Sexual Activity   • Alcohol use: Yes     Comment: \"goes ball to the wall, I drink excessively\"   • Drug use: Yes     Types: IV, Cocaine(coke), Methamphetamines     Comment: \"meth\"   • Sexual activity: Defer         ALLERGIES  Penicillin g        REVIEW OF SYSTEMS  Review of Systems   All systems reviewed and marked as negative except as listed in HPI       PHYSICAL EXAM    I have reviewed the triage vital signs and nursing notes.    ED Triage Vitals [10/29/21 0817]   Temp Heart Rate Resp BP SpO2   99 °F (37.2 °C) 101 17 178/99 98 %      Temp src Heart Rate Source Patient Position BP Location FiO2 (%)   Tympanic -- -- -- --       GENERAL: alert well developed, well nourished in no distress, disheveled   HENT: NCAT, neck supple, trachea midline  EYES: no scleral icterus, PERRL, normal conjunctivae  CV: regular rhythm, regular rate, no murmur  RESPIRATORY: unlabored effort, CTAB  ABDOMEN: soft, diffusely tender without rebound, nondistended, bowel sounds present.   : perineum is grossly excoriated , feces and urine are scattered inside of patients shorts and on his buttocks.  MUSCULOSKELETAL: no gross deformity  NEURO: alert,  sensory and motor function of extremities grossly intact, speech clear, mental status normal/baseline  SKIN: warm, dry, no rash  PSYCH:  Appropriate mood and affect    Vital signs and nursing notes reviewed.          LAB RESULTS  Recent Results (from the past 24 hour(s))   Green Top (Gel)    Collection Time: 10/29/21  8:46 AM   Result Value Ref Range    Extra Tube Hold for add-ons.    Lavender Top    Collection Time: 10/29/21  8:46 AM   Result Value Ref Range    Extra Tube hold for add-on    Gold Top - SST    Collection Time: " 10/29/21  8:46 AM   Result Value Ref Range    Extra Tube Hold for add-ons.    Light Blue Top    Collection Time: 10/29/21  8:46 AM   Result Value Ref Range    Extra Tube hold for add-on    Ethanol    Collection Time: 10/29/21  8:46 AM    Specimen: Blood   Result Value Ref Range    Ethanol <10 0 - 10 mg/dL    Ethanol % <0.010 %   Comprehensive Metabolic Panel    Collection Time: 10/29/21  8:46 AM    Specimen: Blood   Result Value Ref Range    Glucose 118 (H) 65 - 99 mg/dL    BUN 42 (H) 6 - 20 mg/dL    Creatinine 0.97 0.76 - 1.27 mg/dL    Sodium 134 (L) 136 - 145 mmol/L    Potassium 4.1 3.5 - 5.2 mmol/L    Chloride 94 (L) 98 - 107 mmol/L    CO2 26.8 22.0 - 29.0 mmol/L    Calcium 9.1 8.6 - 10.5 mg/dL    Total Protein 7.8 6.0 - 8.5 g/dL    Albumin 4.60 3.50 - 5.20 g/dL    ALT (SGPT) 32 1 - 41 U/L    AST (SGOT) 56 (H) 1 - 40 U/L    Alkaline Phosphatase 93 39 - 117 U/L    Total Bilirubin 0.9 0.0 - 1.2 mg/dL    eGFR Non African Amer 81 >60 mL/min/1.73    Globulin 3.2 gm/dL    A/G Ratio 1.4 g/dL    BUN/Creatinine Ratio 43.3 (H) 7.0 - 25.0    Anion Gap 13.2 5.0 - 15.0 mmol/L   Lipase    Collection Time: 10/29/21  8:46 AM    Specimen: Blood   Result Value Ref Range    Lipase 23 13 - 60 U/L   Magnesium    Collection Time: 10/29/21  8:46 AM    Specimen: Blood   Result Value Ref Range    Magnesium 2.7 (H) 1.6 - 2.6 mg/dL   Troponin    Collection Time: 10/29/21  8:46 AM    Specimen: Blood   Result Value Ref Range    Troponin T <0.010 0.000 - 0.030 ng/mL   CBC Auto Differential    Collection Time: 10/29/21  8:46 AM    Specimen: Blood   Result Value Ref Range    WBC 17.86 (H) 3.40 - 10.80 10*3/mm3    RBC 4.59 4.14 - 5.80 10*6/mm3    Hemoglobin 13.4 13.0 - 17.7 g/dL    Hematocrit 39.3 37.5 - 51.0 %    MCV 85.6 79.0 - 97.0 fL    MCH 29.2 26.6 - 33.0 pg    MCHC 34.1 31.5 - 35.7 g/dL    RDW 13.4 12.3 - 15.4 %    RDW-SD 40.5 37.0 - 54.0 fl    MPV 10.4 6.0 - 12.0 fL    Platelets 424 140 - 450 10*3/mm3    Neutrophil % 78.9 (H) 42.7 - 76.0  %    Lymphocyte % 6.9 (L) 19.6 - 45.3 %    Monocyte % 13.2 (H) 5.0 - 12.0 %    Eosinophil % 0.3 0.3 - 6.2 %    Basophil % 0.2 0.0 - 1.5 %    Immature Grans % 0.5 0.0 - 0.5 %    Neutrophils, Absolute 14.09 (H) 1.70 - 7.00 10*3/mm3    Lymphocytes, Absolute 1.23 0.70 - 3.10 10*3/mm3    Monocytes, Absolute 2.36 (H) 0.10 - 0.90 10*3/mm3    Eosinophils, Absolute 0.06 0.00 - 0.40 10*3/mm3    Basophils, Absolute 0.03 0.00 - 0.20 10*3/mm3    Immature Grans, Absolute 0.09 (H) 0.00 - 0.05 10*3/mm3    nRBC 0.0 0.0 - 0.2 /100 WBC   ECG 12 Lead    Collection Time: 10/29/21 10:20 AM   Result Value Ref Range    QT Interval 368 ms   Urinalysis With Microscopic If Indicated (No Culture) - Urine, Clean Catch    Collection Time: 10/29/21 10:40 AM    Specimen: Urine, Clean Catch   Result Value Ref Range    Color, UA Yellow Yellow, Straw    Appearance, UA Clear Clear    pH, UA 6.0 5.0 - 8.0    Specific Gravity, UA 1.027 1.005 - 1.030    Glucose, UA Negative Negative    Ketones, UA 15 mg/dL (1+) (A) Negative    Bilirubin, UA Negative Negative    Blood, UA Negative Negative    Protein, UA Negative Negative    Leuk Esterase, UA Negative Negative    Nitrite, UA Negative Negative    Urobilinogen, UA 1.0 E.U./dL 0.2 - 1.0 E.U./dL   Urine Drug Screen - Urine, Clean Catch    Collection Time: 10/29/21 10:40 AM    Specimen: Urine, Clean Catch   Result Value Ref Range    Amphet/Methamphet, Screen Positive (A) Negative    Barbiturates Screen, Urine Negative Negative    Benzodiazepine Screen, Urine Negative Negative    Cocaine Screen, Urine Negative Negative    Opiate Screen Negative Negative    THC, Screen, Urine Negative Negative    Methadone Screen, Urine Negative Negative    Oxycodone Screen, Urine Negative Negative       Ordered the above labs and independently reviewed the results.        RADIOLOGY  CT Abdomen Pelvis With Contrast    Result Date: 10/29/2021  CT ABDOMEN PELVIS W CONTRAST-  Radiation dose reduction techniques were utilized,  including automated exposure control and exposure modulation based on body size.  Clinical: 56-year-old male with upper abdominal pain  COMPARISON CT of the abdomen and pelvis 9/13/2021  FINDINGS: There is again wall thickening of the distal esophagus however the amount has diminished since 9/13/2021. The liver, gallbladder and pancreas are satisfactory in appearance, no biliary duct dilatation. Both adrenal glands are normal. The spleen has been removed.  There is a tiny right renal cortical cysts, the kidneys are otherwise normal, no calculus nor obstructive uropathy. The urinary bladder is satisfactory in appearance. Prostate and seminal vesicles within normal limits.  The appendix, colon and small bowel are satisfactory in appearance, stomach within normal limits. No duodenal abnormality seen. Diameter of the aorta is within normal limits.  CONCLUSION: 1. There is thickening of the distal esophagus however the degree has considerably improved since 9/13/2021. 2. No acute intra-abdominal abnormality.   This report was finalized on 10/29/2021 12:22 PM by Dr. Uzair Ortega M.D.      XR Chest 1 View    Result Date: 10/29/2021  CHEST SINGLE VIEW  HISTORY: Abdominal pain for 2 days. Drug use.  COMPARISON: None  FINDINGS: The heart and mediastinal structures are within normal limits. Lungs appear clear and there is no evidence for pulmonary edema or pleural effusion or infiltrate.Cardiac monitoring leads are noted.      No evidence for active disease in the chest.  This report was finalized on 10/29/2021 10:25 AM by Dr. Vishal Barber M.D.        I ordered the above noted radiological studies. Independently reviewed by me and discussed with radiologist.  See dictation above for official radiology interpretation.      PROCEDURES    Procedures        MEDICATIONS GIVEN IN ER    Medications   sodium chloride 0.9 % flush 10 mL (has no administration in time range)   sodium chloride 0.9 % flush 10 mL (has no  administration in time range)   multivitamin with minerals 1 tablet (1 tablet Oral Given 10/29/21 1022)   sodium chloride 0.9 % bolus 1,000 mL (0 mL Intravenous Stopped 10/29/21 1407)   ondansetron (ZOFRAN) injection 4 mg (4 mg Intravenous Given 10/29/21 1019)   thiamine (B-1) 100 mg in sodium chloride 0.9 % 100 mL IVPB (0 mg Intravenous Stopped 10/29/21 1109)   iopamidol (ISOVUE-300) 61 % injection 100 mL (85 mL Intravenous Given 10/29/21 1142)   cefTRIAXone (ROCEPHIN) 1 g in sodium chloride 0.9 % 100 mL IVPB-VTB (0 g Intravenous Stopped 10/29/21 1445)         PROGRESS, DATA ANALYSIS, CONSULTS, AND MEDICAL DECISION MAKING    All labs have been independently reviewed by me.  All radiology studies have been reviewed by me.   EKG's independently reviewed by me.  Discussion below represents my analysis of pertinent findings related to patient's condition, differential diagnosis, treatment plan and final disposition.    DIFFERENTIAL DIAGNOSIS INCLUDE BUT NOT LIMITED TO: Gastroenteritis, flatus, appendicitis, pancreatitis, renal colic, nephrolithiasis, renal infarct, splenic infarct, mesenteric ischemia, perforated bowel, SBO, diverticulitis, colitis, abdominal wall pain, muscle spasm, IBS, biliary colic, cholecystitis      ED Course as of 10/29/21 1454   Fri Oct 29, 2021   1015 I viewed cxr on pacs. My findings are no cm.  [JS]   1020 WBC(!): 17.86 [JS]   1020 BUN(!): 42 [JS]   1020 Magnesium(!): 2.7 [JS]   1020 Troponin T: <0.010 [JS]   1020 Neutrophils Absolute(!): 14.09 [JS]   1136 Amphet/Methamphet, Screen(!): Positive [JS]   1136 Ketones, UA(!): 15 mg/dL (1+) [JS]   1300 Discussed pt with Dr Hernandez. She is agreeable to admit the pt [JS]      ED Course User Index  [JS] Shameka Anders APRN       AS OF 14:54 EDT VITALS:        BP - 130/71  HR - 95  TEMP - 99 °F (37.2 °C) (Tympanic)  O2 SATS - 96%      DIAGNOSIS  Final diagnoses:   Cellulitis of other specified site   Polydrug abuse (HCC)   Leukocytosis,  "unspecified type         DISPOSITION  Admission    Pt masked in first look. I wore appropriate PPE throughout my encounters with the pt. I performed hand hygiene on entry into the pt room and upon exit.     Dictated utilizing Dragon dictation:  Much of this encounter note is an electronic transcription/translation of spoken language to printed text. The electronic translation of spoken language may permit erroneous, or at times, nonsensical words or phrases to be inadvertently transcribed; Although I have reviewed the note for such errors, some may still exist.     Shameka Anders, APRN  10/29/21 1454      Electronically signed by Akbar Garvey MD at 10/29/21 1601     Grace Boston, RN at 10/29/21 0842        Pt states he just got released from [facility] 5 days ago, was treated for bipolar. States \"since I been out I been going hard, balls to the walls\", states he split \"a 30 pack of budweiser with a few friends\" approx a few hours ago. Reports using meth, cocaine, and heroin- last use yesterday. Pt denies SI/HI at this time. States \"I do sometimes see people following me that might not be there\". Reports only taking seroquel at this time.   Pt c/o generalized abd pain, began approx 2 days ago, worsening this morning while at [employer] \"drinking, just going hammer at it\".   Pt wearing mask throughout encouter. This RN wearing mask and goggles throughout encounter.       Grace Boston, WALLACE  10/29/21 0848       Grace Boston RN  10/29/21 0845      Electronically signed by Grace Boston, RN at 10/29/21 0805     Akbar Garvey MD at 10/29/21 122        I have supervised the care provided by the midlevel provider.    We have discussed this patient's history, physical exam, and treatment plan.   I have reviewed the note and have personally examined the patient and agree with the plan of care.  See attached attending note.  My personal findings are below:    Patient complains of generalized " abdominal pain for the past 2 days.  Patient admits to recent methamphetamine, cocaine, heroin, and alcohol use.  He was recently released from [facility].    On exam: Awake and alert.  Disheveled and unkempt.  Mucous membranes are mildly dry.  Heart is tachycardic but regular.  Lungs are clear bilaterally.  There is mild generalized abdominal tenderness without rebound or guarding.  There is tender erythema on medial thighs bilaterally.    Labs reviewed.  White blood cell count is elevated with a left shift.  CT scan was negative acute.  Patient does have cellulitis of both legs.  He will be started on IV antibiotics and admitted.     Akbar Garvey MD  10/29/21 1424      Electronically signed by Akbar Garvey MD at 10/29/21 1424     Grace Boston, RN at 10/29/21 1628          Nursing report ED to floor  David Villalta  56 y.o.  male    HPI :   Chief Complaint   Patient presents with   • Abdominal Pain       Admitting doctor:   Karen Hernandez MD    Admitting diagnosis:   The primary encounter diagnosis was Cellulitis of other specified site. Diagnoses of Polydrug abuse (HCC) and Leukocytosis, unspecified type were also pertinent to this visit.    Code status:   Current Code Status     Date Active Code Status Order ID Comments User Context       Prior    Advance Care Planning Activity          Allergies:   Penicillin g    Intake and Output  No intake or output data in the 24 hours ending 10/29/21 1628    Weight:       10/29/21  0838   Weight: 87 kg (191 lb 14.4 oz)       Most recent vitals:   Vitals:    10/29/21 1110 10/29/21 1410 10/29/21 1411 10/29/21 1416   BP:    130/71   Pulse:  94  95   Resp: 15  17 16   Temp:       TempSrc:       SpO2:  97%  96%   Weight:       Height:           Active LDAs/IV Access:   Lines, Drains & Airways     Active LDAs     Name Placement date Placement time Site Days    Peripheral IV 10/29/21 0844 Right Antecubital 10/29/21  0844  Antecubital  less than 1                 Labs (abnormal labs have a star):   Labs Reviewed   COMPREHENSIVE METABOLIC PANEL - Abnormal; Notable for the following components:       Result Value    Glucose 118 (*)     BUN 42 (*)     Sodium 134 (*)     Chloride 94 (*)     AST (SGOT) 56 (*)     BUN/Creatinine Ratio 43.3 (*)     All other components within normal limits    Narrative:     GFR Normal >60  Chronic Kidney Disease <60  Kidney Failure <15     URINALYSIS W/ MICROSCOPIC IF INDICATED (NO CULTURE) - Abnormal; Notable for the following components:    Ketones, UA 15 mg/dL (1+) (*)     All other components within normal limits    Narrative:     Urine microscopic not indicated.   MAGNESIUM - Abnormal; Notable for the following components:    Magnesium 2.7 (*)     All other components within normal limits   URINE DRUG SCREEN - Abnormal; Notable for the following components:    Amphet/Methamphet, Screen Positive (*)     All other components within normal limits    Narrative:     Negative Thresholds Per Drugs Screened:    Amphetamines                 500 ng/ml  Barbiturates                 200 ng/ml  Benzodiazepines              100 ng/ml  Cocaine                      300 ng/ml  Methadone                    300 ng/ml  Opiates                      300 ng/ml  Oxycodone                    100 ng/ml  THC                           50 ng/ml    The Normal Value for all drugs tested is negative. This report includes final unconfirmed screening results to be used for medical treatment purposes only. Unconfirmed results must not be used for non-medical purposes such as employment or legal testing. Clinical consideration should be applied to any drug of abuse test, particularly when unconfirmed results are used.           CBC WITH AUTO DIFFERENTIAL - Abnormal; Notable for the following components:    WBC 17.86 (*)     Neutrophil % 78.9 (*)     Lymphocyte % 6.9 (*)     Monocyte % 13.2 (*)     Neutrophils, Absolute 14.09 (*)     Monocytes, Absolute 2.36 (*)      Immature Grans, Absolute 0.09 (*)     All other components within normal limits   COVID-19,[facility] IN-HOUSE CEPHEID/ANA MARIA, NP SWAB IN TRANSPORT MEDIA 8-12 HR TAT - Normal    Narrative:     Fact sheet for providers: https://www.fda.gov/media/398035/download     Fact sheet for patients: https://www.fda.gov/media/317232/download   LIPASE - Normal   TROPONIN (IN-HOUSE) - Normal    Narrative:     Troponin T Reference Range:  <= 0.03 ng/mL-   Negative for AMI  >0.03 ng/mL-     Abnormal for myocardial necrosis.  Clinicians would have to utilize clinical acumen, EKG, Troponin and serial changes to determine if it is an Acute Myocardial Infarction or myocardial injury due to an underlying chronic condition.       Results may be falsely decreased if patient taking Biotin.     COVID PRE-OP / PRE-PROCEDURE SCREENING ORDER (NO ISOLATION)    Narrative:     The following orders were created for panel order COVID PRE-OP / PRE-PROCEDURE SCREENING ORDER (NO ISOLATION) - Swab, Nasopharynx.  Procedure                               Abnormality         Status                     ---------                               -----------         ------                     COVID-19,[facility] IN-HOUSE...[[id number]]  Normal              Final result                 Please view results for these tests on the individual orders.   RAINBOW DRAW    Narrative:     The following orders were created for panel order [place] Draw.  Procedure                               Abnormality         Status                     ---------                               -----------         ------                     Green Top (Gel)[[id number]]                                  Final result               Lavender Top[[id number]]                                     Final result               Gold Top - SST[[id number]]                                   Final result               Light Blue Top[[id number]]                                   Final result                 Please view  results for these tests on the individual orders.   ETHANOL   GREEN TOP   LAVENDER TOP   GOLD TOP - SST   LIGHT BLUE TOP   CBC AND DIFFERENTIAL    Narrative:     The following orders were created for panel order CBC & Differential.  Procedure                               Abnormality         Status                     ---------                               -----------         ------                     CBC Auto Differential[[id number]]        Abnormal            Final result                 Please view results for these tests on the individual orders.       EKG:   ECG 12 Lead   Preliminary Result   HEART RATE= 99  bpm   RR Interval= 606  ms   WY Interval= 135  ms   P Horizontal Axis= -11  deg   P Front Axis= 69  deg   QRSD Interval= 92  ms   QT Interval= 368  ms   QRS Axis= 6  deg   T Wave Axis= 52  deg   - NORMAL ECG -   Sinus rhythm   Electronically Signed By:    Date and Time of Study: 2021-10-29 10:20:30          Meds given in ED:   Medications   sodium chloride 0.9 % flush 10 mL (has no administration in time range)   sodium chloride 0.9 % flush 10 mL (has no administration in time range)   multivitamin with minerals 1 tablet (1 tablet Oral Given 10/29/21 1022)   sodium chloride 0.9 % bolus 1,000 mL (0 mL Intravenous Stopped 10/29/21 1407)   ondansetron (ZOFRAN) injection 4 mg (4 mg Intravenous Given 10/29/21 1019)   thiamine (B-1) 100 mg in sodium chloride 0.9 % 100 mL IVPB (0 mg Intravenous Stopped 10/29/21 1109)   iopamidol (ISOVUE-300) 61 % injection 100 mL (85 mL Intravenous Given 10/29/21 1142)   cefTRIAXone (ROCEPHIN) 1 g in sodium chloride 0.9 % 100 mL IVPB-VTB (0 g Intravenous Stopped 10/29/21 1445)       Imaging results:  XR Chest 1 View    Result Date: 10/29/2021  No evidence for active disease in the chest.  This report was finalized on 10/29/2021 10:25 AM by Dr. Vishal Barber M.D.        Ambulatory status:   - assistance    Social issues:   Social History     Socioeconomic History   • Marital  "status: Single   Tobacco Use   • Smoking status: Current Every Day Smoker     Packs/day: 0.50     Types: Cigarettes   • Smokeless tobacco: Never Used   Vaping Use   • Vaping Use: Some days   • Substances: Nicotine   • Devices: Disposable   Substance and Sexual Activity   • Alcohol use: Yes     Comment: \"goes ball to the wall, I drink excessively\"   • Drug use: Yes     Types: IV, Cocaine(coke), Methamphetamines     Comment: \"meth\"   • Sexual activity: Defer        Nursing report ED to floor:     Grace Boston RN  10/29/21 1628      Electronically signed by Grace Boston RN at 10/29/21 1628       "

## 2021-11-01 NOTE — NURSING NOTE
Wound/ostomy - patient with incontinence associated excoriation. Redness, burns, demonstrated to patient how to use a pillow case to place in groin and around scrotum to prevent friction. Magic barrier cream to aid in healing.

## 2021-11-01 NOTE — PLAN OF CARE
Goal Outcome Evaluation:  Plan of Care Reviewed With: patient           Outcome Summary: IV ABX changed to Cefazolin every 8 hours.  Possible DC tomorrow.  Cream appled to groin area.  VSS.  Will cont to monitor.

## 2021-11-01 NOTE — CASE MANAGEMENT/SOCIAL WORK
Continued Stay Note  [facility]     Patient Name: David Villalta  MRN: [id number]  Today's Date: 11/1/2021    Admit Date: 10/29/2021     Discharge Plan     Row Name 11/01/21 0831       Plan    Plan CCP to follow.   COLEEN Vaughn RN    Patient/Family in Agreement with Plan yes    Plan Comments FACE SHEET VERIFIED.  Spoke to pt at bedside.  Pt has a guardian ( Xiang Gooden [phone]).  Called and spoke with Mr. Prakash.  Pt is homeless. Guardian states he has arranged multiple apartments and placements and pt always leaves.  Pt also loses or sells his food stamps per guardian.  Pt is independent with ADLs per guardian.  Guardian states he is working with the court system to find pt new guardian.   CCP will follow for needs.   COLEEN Vaughn RN               Discharge Codes    No documentation.                     Mirtha Vaughn, RN

## 2021-11-01 NOTE — PLAN OF CARE
Goal Outcome Evaluation:  Plan of Care Reviewed With: patient        Progress: improving  Outcome Summary: Pt seen b OT this date for evaluation. Pt is a 55 y/o male admit to [facility] for Cellulitis who is homeless and reports that he use to use a walking stick but does not now. Upon arrival pt lying supine in bed, pleasant and agreeable to work with OT, 4/10 pain L foot, A&O. Pt performed bed mobility with SBA to sit EOB. Pt completed LB dressing with SBA. Pt perfomed sit>stand transition with use of of HHA x1 to side step towards HOB as pt noted with decrease balance. Pt declined further ADL task this date due to fatigue. Pt returned in supine, needs in reach, alarm on. Pt to benefit from skilled OT services to address goals and deficits. OT rec SNF at D/C. OT wore mask, gloves, glasses, hand hygiene performed, appropriate ppe worn during encounter.

## 2021-11-01 NOTE — THERAPY EVALUATION
Patient Name: David Villalta  : 1965    MRN: [id number]                              Today's Date: 2021       Admit Date: 10/29/2021    Visit Dx:     ICD-10-CM ICD-9-CM   1. Cellulitis of other specified site  L03.818 682.8   2. Polydrug abuse (HCC)  F19.10 305.90   3. Leukocytosis, unspecified type  D72.829 288.60     Patient Active Problem List   Diagnosis   • History of DVT (deep vein thrombosis)   • Hyponatremia   • Alcohol dependence (HCC)   • Esophagitis   • Gastritis and duodenitis   • Cellulitis   • Substance use disorder   • Overweight   • Hypokalemia   • Hypophosphatemia   • Intertriginous candidiasis   • Bipolar disorder (HCC)   • Homeless     Past Medical History:   Diagnosis Date   • Anxiety    • Bipolar disorder (HCC)    • Depression    • Hepatitis C    • Hx of drug abuse (HCC)    • Hyponatremia 2021   • Leukocytosis 2021   • Pulmonary embolism (HCC)      Past Surgical History:   Procedure Laterality Date   • ANKLE SURGERY     • ENDOSCOPY N/A 2021    Procedure: ESOPHAGOGASTRODUODENOSCOPY WITH COLD BIOPSIES;  Surgeon: Allyson Magaña MD;  Location: [facility] ENDOSCOPY;  Service: Gastroenterology;  Laterality: N/A;  PRE: HEMETEMESIS,  ESOPHAGEAL THICKENING  POST:ESOPHAGITIS, GASTRITIS, DUODENITIS, EROSION   • SPLENECTOMY        General Information     Row Name 21 1445          OT Time and Intention    Document Type evaluation  -BL     Mode of Treatment individual therapy; occupational therapy  -BL     Row Name 21 1445          General Information    Patient Profile Reviewed yes  -BL     Prior Level of Function independent:; ADL's; feeding; grooming; dressing; bathing  -BL     Existing Precautions/Restrictions fall  -BL     Barriers to Rehab medically complex; previous functional deficit  -BL     Row Name 21 1445          Living Environment    Lives With alone  -BL     Row Name 21 1445          Cognition    Orientation Status (Cognition) oriented x 3  -BL      Row Name 11/01/21 1445          Safety Issues, Functional Mobility    Safety Issues Affecting Function (Mobility) insight into deficits/self-awareness; awareness of need for assistance; judgment; safety precaution awareness  -     Impairments Affecting Function (Mobility) balance; coordination; endurance/activity tolerance; strength; pain  -           User Key  (r) = Recorded By, (t) = Taken By, (c) = Cosigned By    Initials Name Provider Type     Karen Larsen OT Occupational Therapist                 Mobility/ADL's     Row Name 11/01/21 1446          Bed Mobility    Bed Mobility supine-sit; sit-supine  -BL     Supine-Sit [place] (Bed Mobility) standby assist; verbal cues  -     Sit-Supine [place] (Bed Mobility) standby assist; verbal cues  -     Assistive Device (Bed Mobility) bed rails; head of bed elevated  -     Row Name 11/01/21 1446          Transfers    Transfers sit-stand transfer  -     Comment (Transfers) Pt performed sit>stand transition with HHA x1 to side step towards HOB for increase positioning  -     Sit-Stand [place] (Transfers) contact guard; verbal cues  -     Row Name 11/01/21 1446          Sit-Stand Transfer    Assistive Device (Sit-Stand Transfers) --  HHA x1  -BL     [facility] Name 11/01/21 1446          Activities of Daily Living    BADL Assessment/Intervention lower body dressing  -     Row Name 11/01/21 1446          Lower Body Dressing Assessment/Training    [place] Level (Lower Body Dressing) doff; don; socks; standby assist  -     Position (Lower Body Dressing) edge of bed sitting  -           User Key  (r) = Recorded By, (t) = Taken By, (c) = Cosigned By    Initials Name Provider Type     Karen Larsen OT Occupational Therapist               Obj/Interventions     Row Name 11/01/21 1447          Sensory Assessment (Somatosensory)    Sensory Assessment (Somatosensory) UE sensation intact  -     Row Name 11/01/21 1447          Vision  Assessment/Intervention    Visual Impairment/Limitations WFL  -BL     Row Name 11/01/21 1447          Range of Motion Comprehensive    General Range of Motion bilateral upper extremity ROM WFL  -BL     Row Name 11/01/21 1447          Strength Comprehensive (MMT)    Comment, General Manual Muscle Testing (MMT) Assessment BUE 3+/5  -BL     Row Name 11/01/21 1447          Motor Skills    Motor Skills coordination  -     Coordination bilateral; upper extremity; minimal impairment; dysdiadochokinesia; dysmetria  -BL     Row Name 11/01/21 1447          Balance    Balance Assessment sitting static balance; sitting dynamic balance; sit to stand dynamic balance; standing static balance; standing dynamic balance  -BL     Static Sitting Balance WFL; unsupported; sitting, edge of bed  -BL     Dynamic Sitting Balance WFL; unsupported; sitting, edge of bed  -BL     Sit to Stand Dynamic Balance mild impairment; supported; standing  -BL     Static Standing Balance mild impairment; supported; standing  -BL     Dynamic Standing Balance mild impairment; supported; standing  -BL     Balance Interventions sitting; standing; sit to stand; supported; static; dynamic; occupation based/functional task  -           User Key  (r) = Recorded By, (t) = Taken By, (c) = Cosigned By    Initials Name Provider Type    BL Karen Larsen OT Occupational Therapist               Goals/Plan     Row Name 11/01/21 1452          Transfer Goal 1 (OT)    Activity/Assistive Device (Transfer Goal 1, OT) transfers, all  -BL     [place] Level/Cues Needed (Transfer Goal 1, OT) standby assist  -BL     Time Frame (Transfer Goal 1, OT) short term goal (STG); 2 weeks  -     Progress/Outcome (Transfer Goal 1, OT) continuing progress toward goal  -BL     Row Name 11/01/21 1452          Bathing Goal 1 (OT)    Activity/Device (Bathing Goal 1, OT) bathing skills, all  -BL     [place] Level/Cues Needed (Bathing Goal 1, OT) contact guard assist  -BL      Time Frame (Bathing Goal 1, OT) short term goal (STG); 2 weeks  -BL     Progress/Outcomes (Bathing Goal 1, OT) continuing progress toward goal  -BL     Row Name 11/01/21 1452          Grooming Goal 1 (OT)    Activity/Device (Grooming Goal 1, OT) grooming skills, all  -BL     [place] (Grooming Goal 1, OT) set-up required  -BL     Time Frame (Grooming Goal 1, OT) short term goal (STG); 2 weeks  -BL     Progress/Outcome (Grooming Goal 1, OT) continuing progress toward goal  -BL     Row Name 11/01/21 1452          Strength Goal 1 (OT)    Strength Goal 1 (OT) Pt will be independent with HEP prior to D/C.  -BL     Time Frame (Strength Goal 1, OT) short term goal (STG); 2 weeks  -BL     Progress/Outcome (Strength Goal 1, OT) continuing progress toward goal  -BL     Row Name 11/01/21 1452          Therapy Assessment/Plan (OT)    Planned Therapy Interventions (OT) activity tolerance training; occupation/activity based interventions; IADL retraining; BADL retraining; patient/caregiver education/training; transfer/mobility retraining; strengthening exercise; ROM/therapeutic exercise  -BL           User Key  (r) = Recorded By, (t) = Taken By, (c) = Cosigned By    Initials Name Provider Type    BL Karen Larsen, COR[place] Occupational Therapist               Clinical Impression     Row Name 11/01/21 1448          Pain Assessment    Additional Documentation Pain Scale: Numbers Pre/Post-Treatment (Group)  -BL     Row Name 11/01/21 1448          Pain Scale: Numbers Pre/Post-Treatment    Pretreatment Pain Rating 4/10  -BL     Posttreatment Pain Rating 5/10  -BL     Pain Location - Side Left  -BL     Pain Location foot  -BL     Row Name 11/01/21 1448          Plan of Care Review    Plan of Care Reviewed With patient  -BL     Progress improving  -BL     Outcome Summary Pt seen b OT this date for evaluation. Pt is a 57 y/o male admit to [facility] for Cellulitis who is homeless and reports that he use to use a walking stick but does not now.  Upon arrival pt lying supine in bed, pleasant and agreeable to work with OT, 4/10 pain L foot, A&O. Pt performed bed mobility with SBA to sit EOB. Pt completed LB dressing with SBA. Pt perfomed sit>stand transition with use of of HHA x1 to side step towards HOB as pt noted with decrease balance. Pt declined further ADL task this date due to fatigue. Pt returned in supine, needs in reach, alarm on. Pt to benefit from skilled OT services to address goals and deficits. OT rec SNF at D/C. OT wore mask, gloves, glasses, hand hygiene performed, appropriate ppe worn during encounter.  -     Row Name 11/01/21 1448          Therapy Assessment/Plan (OT)    Rehab Potential (OT) good, to achieve stated therapy goals  -     Criteria for Skilled Therapeutic Interventions Met (OT) yes; skilled treatment is necessary  -     Therapy Frequency (OT) 3 times/wk  -     Row Name 11/01/21 1448          Therapy Plan Review/Discharge Plan (OT)    Anticipated Discharge Disposition (OT) [facility] facility  -     Row Name 11/01/21 1448          Vital Signs    Pre Patient Position Supine  -BL     Intra Patient Position Standing  -BL     Post Patient Position Supine  -BL     Row Name 11/01/21 1448          Positioning and Restraints    Pre-Treatment Position in bed  -BL     Post Treatment Position bed  -BL     In Bed supine; call light within reach; encouraged to call for assist; exit alarm on  -BL           User Key  (r) = Recorded By, (t) = Taken By, (c) = Cosigned By    Initials Name Provider Type     Karen Larsen OT Occupational Therapist               Outcome Measures     Row Name 11/01/21 1453          How much help from another is currently needed...    Putting on and taking off regular lower body clothing? 3  -BL     Bathing (including washing, rinsing, and drying) 3  -BL     Toileting (which includes using toilet bed pan or urinal) 3  -BL     Putting on and taking off regular upper body clothing 4  -BL     Taking  care of personal grooming (such as brushing teeth) 3  -BL     Eating meals 4  -BL     AM-PAC 6 Clicks Score (OT) 20  -BL     Row Name 11/01/21 0950          How much help from another person do you currently need...    Turning from your back to your side while in flat bed without using bedrails? 3  -RW     Moving from lying on back to sitting on the side of a flat bed without bedrails? 3  -RW     Moving to and from a bed to a chair (including a wheelchair)? 3  -RW     Standing up from a chair using your arms (e.g., wheelchair, bedside chair)? 3  -RW     Climbing 3-5 steps with a railing? 2  -RW     To walk in hospital room? 3  -RW     AM-PAC 6 Clicks Score (PT) 17  -RW     Row Name 11/01/21 1453          Functional Assessment    Outcome Measure Options AM-PAC 6 Clicks Daily Activity (OT)  -BL           User Key  (r) = Recorded By, (t) = Taken By, (c) = Cosigned By    Initials Name Provider Type    Kurtis Soto, RN Registered Nurse    Karen Cuevas, CORINA Occupational Therapist                Occupational Therapy Education                 Title: PT OT SLP Therapies (In Progress)     Topic: Occupational Therapy (In Progress)     Point: ADL training (Done)     Description:   Instruct learner(s) on proper safety adaptation and remediation techniques during self care or transfers.   Instruct in proper use of assistive devices.              Learning Progress Summary           Patient Acceptance, E, VU by  at 11/1/2021 1456    Comment: The role of OT, D/C planning                   Point: Home exercise program (Not Started)     Description:   Instruct learner(s) on appropriate technique for monitoring, assisting and/or progressing therapeutic exercises/activities.              Learner Progress:  Not documented in this visit.          Point: Precautions (Not Started)     Description:   Instruct learner(s) on prescribed precautions during self-care and functional transfers.              Learner Progress:  Not  documented in this visit.          Point: Body mechanics (Not Started)     Description:   Instruct learner(s) on proper positioning and spine alignment during self-care, functional mobility activities and/or exercises.              Learner Progress:  Not documented in this visit.                      User Key     Initials Effective Dates Name Provider Type Discipline     01/05/21 -  Karen Larsen, [place] Occupational Therapist OT              OT Recommendation and Plan  Planned Therapy Interventions (OT): activity tolerance training, occupation/activity based interventions, IADL retraining, BADL retraining, patient/caregiver education/training, transfer/mobility retraining, strengthening exercise, ROM/therapeutic exercise  Therapy Frequency (OT): 3 times/wk  Plan of Care Review  Plan of Care Reviewed With: patient  Progress: improving  Outcome Summary: Pt seen b OT this date for evaluation. Pt is a 57 y/o male admit to [facility] for Cellulitis who is homeless and reports that he use to use a walking stick but does not now. Upon arrival pt lying supine in bed, pleasant and agreeable to work with OT, 4/10 pain L foot, A&O. Pt performed bed mobility with SBA to sit EOB. Pt completed LB dressing with SBA. Pt perfomed sit>stand transition with use of of HHA x1 to side step towards HOB as pt noted with decrease balance. Pt declined further ADL task this date due to fatigue. Pt returned in supine, needs in reach, alarm on. Pt to benefit from skilled OT services to address goals and deficits. OT rec SNF at D/C. OT wore mask, gloves, glasses, hand hygiene performed, appropriate ppe worn during encounter.     Time Calculation:    Time Calculation- OT     Row Name 11/01/21 1453             Time Calculation- OT    OT Start Time 0936  -BL      OT Stop Time 0957  -BL      OT Time Calculation (min) 21 min  -BL      Total Timed Code Minutes- OT 17 minute(s)  -BL      OT Received On 11/01/21  -      OT - Next Appointment 11/03/21  -       OT Goal Re-Cert Due Date 11/15/21  -BL              Timed Charges    22709 - OT Therapeutic Activity Minutes 17  -BL              Untimed Charges    OT Eval/Re-eval Minutes 4  -BL              Total Minutes    Timed Charges Total Minutes 17  -BL      Untimed Charges Total Minutes 4  -BL       Total Minutes 21  -BL            User Key  (r) = Recorded By, (t) = Taken By, (c) = Cosigned By    Initials Name Provider Type     Karen Larsen OT Occupational Therapist              Therapy Charges for Today     Code Description Service Date Service Provider Modifiers Qty    04902442322 HC OT EVAL LOW COMPLEXITY 2 11/1/2021 Karen Larsen OT GO 1    56221650540 HC OT THERAPEUTIC ACT EA 15 MIN 11/1/2021 Karen Larsen OT GO 1               Karen Larsen OT  11/1/2021

## 2021-11-01 NOTE — CASE MANAGEMENT/SOCIAL WORK
Continued Stay Note  [facility]     Patient Name: David Villalta  MRN: [id number]  Today's Date: 11/1/2021    Admit Date: 10/29/2021     Discharge Plan     Row Name 11/01/21 1418       Plan    Plan SNF referrals made    Patient/Family in Agreement with Plan yes    Plan Comments Spoke with pt via phone as he is in isolation. Pt reports he really wants to go to SNF at d/c and will need walker at d/c. Pt reports he has been to SNF in [place] before but would prefer to stay in the [facility]. Pt reports he has been to homeless shelters in past but not willing to go back. Pt reports his guardian has been his guardian for 13 years. Spoke with Mr. Prakash [phone] via phone who gives permission to reach out to all [facility] SNF's. [place] referrals made to local SNF's. CCP to follow.               Discharge Codes    No documentation.               Expected Discharge Date and Time     Expected Discharge Date Expected Discharge Time    Nov 2, 2021             JEWELL Shankar

## 2021-11-01 NOTE — PLAN OF CARE
Goal Outcome Evaluation:           Pt. Alert, oriented x3, confused at times, no voiced c/o pain, v/s stable, with  skin issues to bilateral groin area, cream applied, no voiced c/o sore to mouth,Pt. Ate snacks at last night, no s/s acute distress noted

## 2021-11-02 LAB
BACTERIA BLD CULT: ABNORMAL
BOTTLE TYPE: ABNORMAL

## 2021-11-02 PROCEDURE — 97110 THERAPEUTIC EXERCISES: CPT

## 2021-11-02 PROCEDURE — 25010000002 CEFAZOLIN 1-4 GM/50ML-% SOLUTION: Performed by: STUDENT IN AN ORGANIZED HEALTH CARE EDUCATION/TRAINING PROGRAM

## 2021-11-02 PROCEDURE — 0 CEFAZOLIN IN DEXTROSE 2-4 GM/100ML-% SOLUTION: Performed by: STUDENT IN AN ORGANIZED HEALTH CARE EDUCATION/TRAINING PROGRAM

## 2021-11-02 RX ORDER — CEFAZOLIN SODIUM 2 G/100ML
2 INJECTION, SOLUTION INTRAVENOUS EVERY 8 HOURS
Status: DISCONTINUED | OUTPATIENT
Start: 2021-11-02 | End: 2021-11-03

## 2021-11-02 RX ADMIN — SUCRALFATE 1 G: 1 TABLET ORAL at 16:31

## 2021-11-02 RX ADMIN — PANTOPRAZOLE SODIUM 40 MG: 40 TABLET, DELAYED RELEASE ORAL at 06:32

## 2021-11-02 RX ADMIN — CEFAZOLIN SODIUM 1 G: 1 INJECTION, SOLUTION INTRAVENOUS at 00:35

## 2021-11-02 RX ADMIN — PANTOPRAZOLE SODIUM 40 MG: 40 TABLET, DELAYED RELEASE ORAL at 16:31

## 2021-11-02 RX ADMIN — NYSTATIN 1 APPLICATION: 100000 OINTMENT TOPICAL at 21:54

## 2021-11-02 RX ADMIN — APIXABAN 5 MG: 5 TABLET, FILM COATED ORAL at 08:52

## 2021-11-02 RX ADMIN — CEFAZOLIN SODIUM 1 G: 1 INJECTION, SOLUTION INTRAVENOUS at 08:53

## 2021-11-02 RX ADMIN — NYSTATIN 1 APPLICATION: 100000 OINTMENT TOPICAL at 08:53

## 2021-11-02 RX ADMIN — APIXABAN 5 MG: 5 TABLET, FILM COATED ORAL at 21:54

## 2021-11-02 RX ADMIN — CEFAZOLIN SODIUM 2 G: 2 INJECTION, SOLUTION INTRAVENOUS at 16:31

## 2021-11-02 RX ADMIN — SUCRALFATE 1 G: 1 TABLET ORAL at 06:32

## 2021-11-02 RX ADMIN — KETOCONAZOLE 1 APPLICATION: 20 CREAM TOPICAL at 08:53

## 2021-11-02 RX ADMIN — SUCRALFATE 1 G: 1 TABLET ORAL at 12:10

## 2021-11-02 RX ADMIN — Medication 1 TABLET: at 08:52

## 2021-11-02 RX ADMIN — QUETIAPINE FUMARATE 300 MG: 100 TABLET ORAL at 21:54

## 2021-11-02 NOTE — PLAN OF CARE
Goal Outcome Evaluation:  Plan of Care Reviewed With: patient           Outcome Summary: Pt no longer on Cardiac Monitoring and will be transferred when bed is available to Med Surg.  Placement search continues for rehab.  Blood cultures return and Cef has been increased to 2g from 1g.  Pt tolerating well.  Will cont to monitor.

## 2021-11-02 NOTE — THERAPY TREATMENT NOTE
Patient Name: David Villalta  : 1965    MRN: [id number]                              Today's Date: 2021       Admit Date: 10/29/2021    Visit Dx:     ICD-10-CM ICD-9-CM   1. Cellulitis of other specified site  L03.818 682.8   2. Polydrug abuse (HCC)  F19.10 305.90   3. Leukocytosis, unspecified type  D72.829 288.60     Patient Active Problem List   Diagnosis   • History of DVT (deep vein thrombosis)   • Hyponatremia   • Alcohol dependence (HCC)   • Esophagitis   • Gastritis and duodenitis   • Cellulitis   • Substance use disorder   • Overweight   • Hypokalemia   • Hypophosphatemia   • Intertriginous candidiasis   • Bipolar disorder (HCC)   • Homeless     Past Medical History:   Diagnosis Date   • Anxiety    • Bipolar disorder (HCC)    • Depression    • Hepatitis C    • Hx of drug abuse (HCC)    • Hyponatremia 2021   • Leukocytosis 2021   • Pulmonary embolism (HCC)      Past Surgical History:   Procedure Laterality Date   • ANKLE SURGERY     • ENDOSCOPY N/A 2021    Procedure: ESOPHAGOGASTRODUODENOSCOPY WITH COLD BIOPSIES;  Surgeon: Allyson Magaña MD;  Location: [facility] ENDOSCOPY;  Service: Gastroenterology;  Laterality: N/A;  PRE: HEMETEMESIS,  ESOPHAGEAL THICKENING  POST:ESOPHAGITIS, GASTRITIS, DUODENITIS, EROSION   • SPLENECTOMY        General Information     Row Name 21 1628          Physical Therapy Time and Intention    Document Type therapy note (daily note)  -     Mode of Treatment physical therapy  -     Row Name 21          General Information    Existing Precautions/Restrictions fall  -     Row Name 218          Cognition    Orientation Status (Cognition) oriented x 3  -SM           User Key  (r) = Recorded By, (t) = Taken By, (c) = Cosigned By    Initials Name Provider Type     Allyson Caballero PTA Physical Therapy Assistant               Mobility     Row Name 219          Bed Mobility    Bed Mobility supine-sit; sit-supine  -      Supine-Sit [place] (Bed Mobility) supervision  -     Sit-Supine [place] (Bed Mobility) supervision  -[facility] Name 11/02/21 1629          Sit-Stand Transfer    Sit-Stand [place] (Transfers) standby assist  -SM     Row Name 11/02/21 1629          Gait/Stairs (Locomotion)    [place] Level (Gait) standby assist  -     Distance in Feet (Gait) 60  -SM     Deviations/Abnormal Patterns (Gait) antalgic; nevaeh decreased; stride length decreased  -     Bilateral Gait Deviations forward flexed posture  -     Comment (Gait/Stairs) limited slightly d/t little SOA and L foot pain  -           User Key  (r) = Recorded By, (t) = Taken By, (c) = Cosigned By    Initials Name Provider Type    Allyosn Ponce PTA Physical Therapy Assistant               Obj/Interventions     Row Name 11/02/21 1632          Motor Skills    Therapeutic Exercise --  standing heel/toe raises and marches x10 reps  -           User Key  (r) = Recorded By, (t) = Taken By, (c) = Cosigned By    Initials Name Provider Type    Allyson Ponce PTA Physical Therapy Assistant               Goals/Plan    No documentation.                Clinical Impression     [facility] Name 11/02/21 1633          Pain    Additional Documentation Pain Scale: Numbers Pre/Post-Treatment (Group)  -SM     Row Name 11/02/21 1633          Pain Scale: Numbers Pre/Post-Treatment    Pretreatment Pain Rating 2/10  -     Posttreatment Pain Rating 4/10  -     Pain Location - Side Left  -     Pain Location foot  -     Pain Intervention(s) Repositioned; Ambulation/increased activity; Rest  -SM     Row Name 11/02/21 1633          Positioning and Restraints    Pre-Treatment Position in bed  -     Post Treatment Position bed  -SM     In Bed supine; call light within reach; encouraged to call for assist  -           User Key  (r) = Recorded By, (t) = Taken By, (c) = Cosigned By    Initials Name Provider Type    Allyson Ponce PTA  Physical Therapy Assistant               Outcome Measures     Row Name 11/02/21 1634 11/02/21 0858       How much help from another person do you currently need...    Turning from your back to your side while in flat bed without using bedrails? 3  -SM 3  -RW    Moving from lying on back to sitting on the side of a flat bed without bedrails? 3  -SM 3  -RW    Moving to and from a bed to a chair (including a wheelchair)? 3  -SM 3  -RW    Standing up from a chair using your arms (e.g., wheelchair, bedside chair)? 3  -SM 3  -RW    Climbing 3-5 steps with a railing? 3  -SM 2  -RW    To walk in hospital room? 3  -SM 3  -RW    AM-PAC 6 Clicks Score (PT) 18  -SM 17  -RW    Row Name 11/02/21 1634          Functional Assessment    Outcome Measure Options AM-PAC 6 Clicks Basic Mobility (PT)  -           User Key  (r) = Recorded By, (t) = Taken By, (c) = Cosigned By    Initials Name Provider Type    Kurtis Soto, RN Registered Nurse    Allyson Ponce, PAM Physical Therapy Assistant                             Physical Therapy Education                 Title: PT OT SLP Therapies (Done)     Topic: Physical Therapy (Done)     Point: Mobility training (Done)     Learning Progress Summary           Patient Acceptance, E,TB,D, VU,NR by  at 11/2/2021 1636    Acceptance, E,TB, VU by CHRISTIANO at 11/2/2021 1437    Acceptance, E,TB, VU by CHRISTIANO at 11/1/2021 1452    Acceptance, E,D, VU,DU by ALVARO at 10/31/2021 1315    Acceptance, E, NR by NAOMI at 10/31/2021 0420                   Point: Home exercise program (Done)     Learning Progress Summary           Patient Acceptance, E,TB,D, VU,NR by  at 11/2/2021 1636    Acceptance, E,TB, VU by CHRISTIANO at 11/2/2021 1437    Acceptance, E,TB, VU by CHRISTIANO at 11/1/2021 1452    Acceptance, E, NR by NAOMI at 10/31/2021 0420                   Point: Body mechanics (Done)     Learning Progress Summary           Patient Acceptance, E,TB,D, VU,NR by  at 11/2/2021 1636    Acceptance, E,TB, VU by CHRISTIANO at 11/2/2021 1433     Acceptance, E,TB, VU by RW at 11/1/2021 1452    Acceptance, E,D, VU,DU by ALVARO at 10/31/2021 1315    Acceptance, E, NR by NAOMI at 10/31/2021 0420                   Point: Precautions (Done)     Learning Progress Summary           Patient Acceptance, E,TB,D, VU,NR by  at 11/2/2021 1636    Acceptance, E,TB, VU by RW at 11/2/2021 1437    Acceptance, E,TB, VU by RW at 11/1/2021 1452    Acceptance, E, NR by NAOMI at 10/31/2021 0420                               User Key     Initials Effective Dates Name Provider Type Discipline     06/16/21 -  Kurtis Hickman, RN Registered Nurse Nurse     03/07/18 -  Allyson Caballero PTA Physical Therapy Assistant PT     10/20/20 -  Abdulkadir Villela, WALLACE Registered Nurse Nurse    ALVARO 08/24/21 -  Rinku Whitehead PT Physical Therapist PT              PT Recommendation and Plan     Plan of Care Reviewed With: patient  Progress: improving  Outcome Summary: Pt tolerated treatment well this date. Increased gait distance to 60ft w/ SBA only. Limited slightly d/t some SOA and L foot pain, though no overt LOB noted. Instructed pt on a few standing exercises, and encouraged him to keep ambulating around room a few times a day. Pt requested to use a cane next visit to ambulate in hallway.     Time Calculation:    PT Charges     Row Name 11/02/21 1638             Time Calculation    Start Time 1559  -      Stop Time 1622  -      Time Calculation (min) 23 min  -      PT Received On 11/02/21  -      PT - Next Appointment 11/04/21  -            User Key  (r) = Recorded By, (t) = Taken By, (c) = Cosigned By    Initials Name Provider Type     Allyson Caballero PTA Physical Therapy Assistant              Therapy Charges for Today     Code Description Service Date Service Provider Modifiers Qty    77985247404 HC PT THER PROC EA 15 MIN 11/2/2021 Allyson Caballero PTA GP 2          PT G-Codes  Outcome Measure Options: AM-PAC 6 Clicks Basic Mobility (PT)  AM-PAC 6 Clicks Score (PT): 18  AM-PAC  6 Clicks Score (OT): 20    Allyson Caballero, PTA  11/2/2021

## 2021-11-02 NOTE — PLAN OF CARE
Goal Outcome Evaluation:         Pt. Alert, oriented x4, ambulatory, using bathroom self, no voiced c/o pain to groing area, cream applied as ordered, no c/o mouth sore, ATB IV therapy continued, v/s stable, slept well until this time, no s/s acute distress  noted

## 2021-11-02 NOTE — PLAN OF CARE
Goal Outcome Evaluation:  Plan of Care Reviewed With: patient        Progress: improving  Outcome Summary: Pt tolerated treatment well this date. Increased gait distance to 60ft w/ SBA only. Limited slightly d/t some SOA and L foot pain, though no overt LOB noted. Instructed pt on a few standing exercises, and encouraged him to keep ambulating around room a few times a day. Pt requested to use a cane next visit to ambulate in hallway.

## 2021-11-02 NOTE — PROGRESS NOTES
"    Name: David Villalta ADMIT: 10/29/2021   : 1965  PCP: Provider, No Known    MRN: [id number] LOS: 2 days   AGE/SEX: 56 y.o. male  ROOM: E665/1     Subjective   Subjective   Rash continues to improve.  He continues to complain of \"infection in my lungs\" and swelling in his feet.  There is no swelling on my exam.    Review of Systems  Denies chest pain, nausea, vomiting, abdominal pain     Objective   Objective   Vital Signs  Temp:  [97.4 °F (36.3 °C)-98 °F (36.7 °C)] 97.4 °F (36.3 °C)  Heart Rate:  [70-87] 76  Resp:  [16-20] 20  BP: (121-126)/(73-79) 122/79  SpO2:  [94 %] 94 %  on   ;   Device (Oxygen Therapy): room air  Body mass index is 27.08 kg/m².  Physical Exam  Constitutional:       General: He is not in acute distress.     Appearance: He is not ill-appearing or diaphoretic.   HENT:      Mouth/Throat:      Mouth: Mucous membranes are moist.   Eyes:      General: No scleral icterus.  Cardiovascular:      Rate and Rhythm: Normal rate and regular rhythm.      Heart sounds: No murmur heard.  No gallop.    Pulmonary:      Effort: Pulmonary effort is normal. No respiratory distress.      Breath sounds: No wheezing or rhonchi.   Abdominal:      General: There is no distension.      Palpations: Abdomen is soft. There is no mass.      Tenderness: There is no abdominal tenderness. There is no guarding.   Skin:     Comments: Intertriginous erythema of the groin   Neurological:      Mental Status: He is alert.         Results Review     I reviewed the patient's new clinical results.  Results from last 7 days   Lab Units 21  0720 10/30/21  0748 10/29/21  0846   WBC 10*3/mm3 10.62 13.13* 17.86*   HEMOGLOBIN g/dL 11.9* 10.8* 13.4   PLATELETS 10*3/mm3 486* 375 424     Results from last 7 days   Lab Units 21  0720 10/30/21  0748 10/29/21  0846   SODIUM mmol/L 139 135* 134*   POTASSIUM mmol/L 4.0 3.4* 4.1   CHLORIDE mmol/L 106 101 94*   CO2 mmol/L 23.6 26.3 26.8   BUN mg/dL 10 10 42*   CREATININE mg/dL 1.05 " 0.74* 0.97   GLUCOSE mg/dL 124* 115* 118*   Estimated Creatinine Clearance: 97.4 mL/min (by C-G formula based on SCr of 1.05 mg/dL).  Results from last 7 days   Lab Units 11/01/21  0720 10/29/21  0846   ALBUMIN g/dL 3.70 4.60   BILIRUBIN mg/dL 0.2 0.9   ALK PHOS U/L 69 93   AST (SGOT) U/L 24 56*   ALT (SGPT) U/L 21 32     Results from last 7 days   Lab Units 11/01/21  0720 10/30/21  0748 10/29/21  0846   CALCIUM mg/dL 9.0 7.9* 9.1   ALBUMIN g/dL 3.70  --  4.60   MAGNESIUM mg/dL 2.0  --  2.7*   PHOSPHORUS mg/dL 2.8 1.4*  --      Results from last 7 days   Lab Units 10/29/21  2140   PROCALCITONIN ng/mL 0.33*     COVID19   Date Value Ref Range Status   10/29/2021 Not Detected Not Detected - Ref. Range Final   09/13/2021 Not Detected Not Detected - Ref. Range Final     No results found for: HGBA1C, POCGLU    CT Abdomen Pelvis With Contrast  CT ABDOMEN PELVIS W CONTRAST-     Radiation dose reduction techniques were utilized, including automated  exposure control and exposure modulation based on body size.     Clinical: 56-year-old male with upper abdominal pain     COMPARISON CT of the abdomen and pelvis 9/13/2021     FINDINGS: There is again wall thickening of the distal esophagus however  the amount has diminished since 9/13/2021. The liver, gallbladder and  pancreas are satisfactory in appearance, no biliary duct dilatation.  Both adrenal glands are normal. The spleen has been removed.     There is a tiny right renal cortical cysts, the kidneys are otherwise  normal, no calculus nor obstructive uropathy. The urinary bladder is  satisfactory in appearance. Prostate and seminal vesicles within normal  limits.     The appendix, colon and small bowel are satisfactory in appearance,  stomach within normal limits. No duodenal abnormality seen. Diameter of  the aorta is within normal limits.     CONCLUSION:  1. There is thickening of the distal esophagus however the degree has  considerably improved since 9/13/2021.  2. No  acute intra-abdominal abnormality.        This report was finalized on 10/29/2021 12:22 PM by Dr. Uzair Ortega M.D.     XR Chest 1 View  Narrative: CHEST SINGLE VIEW     HISTORY: Abdominal pain for 2 days. Drug use.     COMPARISON: None     FINDINGS: The heart and mediastinal structures are within normal limits.  Lungs appear clear and there is no evidence for pulmonary edema or  pleural effusion or infiltrate.Cardiac monitoring leads are noted.     Impression: No evidence for active disease in the chest.     This report was finalized on 10/29/2021 10:25 AM by Dr. Vishal Barber M.D.       I reviewed the patient's daily medications.  Scheduled Medications  apixaban, 5 mg, Oral, Q12H  ceFAZolin, 2 g, Intravenous, Q8H  hydrocortisone-bacitracin-zinc oxide-nystatin, 1 application, Topical, BID  ketoconazole, 1 application, Topical, Q24H  multivitamin with minerals, 1 tablet, Oral, Daily  pantoprazole, 40 mg, Oral, BID AC  QUEtiapine, 300 mg, Oral, Nightly  senna-docusate sodium, 2 tablet, Oral, Daily  sucralfate, 1 g, Oral, TID AC    Infusions   Diet  Diet Soft Texture; Ground; Thin       Assessment/Plan     Active Hospital Problems    Diagnosis  POA   • **Cellulitis [L03.90]  Yes   • Hypophosphatemia [E83.39]  Yes   • Intertriginous candidiasis [B37.2]  Yes   • Bipolar disorder (HCC) [F31.9]  Yes   • Homeless [Z59.00]  Not Applicable   • Substance use disorder [F19.90]  Yes   • Overweight [E66.3]  Yes   • Hypokalemia [E87.6]  Yes   • Hyponatremia [E87.1]  Yes   • History of DVT (deep vein thrombosis) [Z86.718]  Not Applicable      Resolved Hospital Problems   No resolved problems to display.       56 y.o. male with history of polysubstance use who presented for acute onset abdominal pain.  CT abdomen on admission did not show any acute pathology.  He was admitted with sepsis due to nonpurulent cellulitis.    Sepsis due to nonpurulent cellulitis  -he may have an overlying bacterial cellulitis on top of  intertriginous candidal infection of his groin.  He had elevated temperatures on admission (T-max 100.2) and leukocytosis. Will treat for 7 days (end 11/4).  De-escalate to cefazolin. Change to oral on discharge.   -Blood cultures positive for gram-positive cocci in clusters in 1 of 2 bottles.  Suspect contaminant.  Follow-up speciation    Intertriginous candida, groin  -Continue ketoconazole for 14 days (end 11/13).  Wound care consult.  It seems to be improving    Polysubstance use disorder  -UDS positive for amphetamines.  Patient reports daily alcohol use since discharge from [facility]  -Access consulted  -Monitored on CIWA for 72 hours, no signs or symptoms of withdrawal.  Stop CIWA protocol today    Bipolar disorder  -Continue nightly Seroquel    Hypokalemia, hypophosphatemia: Resolved.  Due to overall poor nutritional status.  Replaced per protocol.    Hyponatremia: Likely due to recent poor p.o. intake.  Resolved  History of DVT: Home Eliquis      · Eliquis (home med) for DVT prophylaxis.  · Full code.  · Discussed with patient.  · Anticipate discharge: PT has recommended SNF and he is agreeable.  He is medically ready for discharge when an accepting facility is available      Awa Combs DO  [facility]  11/02/21  14:08 EDT    Patient was placed in face mask on first look.  I wore protective equipment throughout this patient encounter including a face mask, gloves and protective eyewear.  Hand hygiene was performed before donning protective equipment and after removal when leaving the room.

## 2021-11-03 LAB
BACTERIA SPEC AEROBE CULT: ABNORMAL
GRAM STN SPEC: ABNORMAL
GRAM STN SPEC: ABNORMAL
ISOLATED FROM: ABNORMAL

## 2021-11-03 PROCEDURE — 0 CEFAZOLIN IN DEXTROSE 2-4 GM/100ML-% SOLUTION: Performed by: STUDENT IN AN ORGANIZED HEALTH CARE EDUCATION/TRAINING PROGRAM

## 2021-11-03 RX ORDER — CEPHALEXIN 500 MG/1
500 CAPSULE ORAL EVERY 6 HOURS SCHEDULED
Status: DISCONTINUED | OUTPATIENT
Start: 2021-11-03 | End: 2021-11-03

## 2021-11-03 RX ADMIN — SUCRALFATE 1 G: 1 TABLET ORAL at 12:43

## 2021-11-03 RX ADMIN — PANTOPRAZOLE SODIUM 40 MG: 40 TABLET, DELAYED RELEASE ORAL at 18:00

## 2021-11-03 RX ADMIN — Medication 1 TABLET: at 09:24

## 2021-11-03 RX ADMIN — QUETIAPINE FUMARATE 300 MG: 100 TABLET ORAL at 20:58

## 2021-11-03 RX ADMIN — NYSTATIN 1 APPLICATION: 100000 OINTMENT TOPICAL at 20:59

## 2021-11-03 RX ADMIN — HYDROCODONE BITARTRATE AND ACETAMINOPHEN 1 TABLET: 7.5; 325 TABLET ORAL at 15:34

## 2021-11-03 RX ADMIN — CEPHALEXIN 500 MG: 500 CAPSULE ORAL at 09:23

## 2021-11-03 RX ADMIN — KETOCONAZOLE 1 APPLICATION: 20 CREAM TOPICAL at 09:24

## 2021-11-03 RX ADMIN — NYSTATIN 1 APPLICATION: 100000 OINTMENT TOPICAL at 09:23

## 2021-11-03 RX ADMIN — SUCRALFATE 1 G: 1 TABLET ORAL at 18:00

## 2021-11-03 RX ADMIN — APIXABAN 5 MG: 5 TABLET, FILM COATED ORAL at 09:23

## 2021-11-03 RX ADMIN — SUCRALFATE 1 G: 1 TABLET ORAL at 09:23

## 2021-11-03 RX ADMIN — APIXABAN 5 MG: 5 TABLET, FILM COATED ORAL at 20:58

## 2021-11-03 RX ADMIN — CEPHALEXIN 500 MG: 500 CAPSULE ORAL at 12:43

## 2021-11-03 RX ADMIN — PANTOPRAZOLE SODIUM 40 MG: 40 TABLET, DELAYED RELEASE ORAL at 09:23

## 2021-11-03 RX ADMIN — HYDROCODONE BITARTRATE AND ACETAMINOPHEN 1 TABLET: 7.5; 325 TABLET ORAL at 09:32

## 2021-11-03 RX ADMIN — CEFAZOLIN SODIUM 2 G: 2 INJECTION, SOLUTION INTRAVENOUS at 00:13

## 2021-11-03 NOTE — PROGRESS NOTES
Name: David Villalta ADMIT: 10/29/2021   : 1965  PCP: Provider, No Known    MRN: [id number] LOS: 3 days   AGE/SEX: 56 y.o. male  ROOM: E665/1     Subjective   Subjective   Continues to complain of pain and what he says is infection in various body parts (both legs, arms, lungs, back).  There are no rashes anywhere.  His groin rash continues to improve.    Review of Systems  Denies chest pain, nausea, vomiting, abdominal pain     Objective   Objective   Vital Signs  Temp:  [97.4 °F (36.3 °C)-98.8 °F (37.1 °C)] 98.3 °F (36.8 °C)  Heart Rate:  [60-76] 70  Resp:  [17-20] 17  BP: (116-122)/(72-84) 116/76  SpO2:  [94 %-95 %] 94 %  on   ;   Device (Oxygen Therapy): room air  Body mass index is 27.14 kg/m².  Physical Exam  Constitutional:       General: He is not in acute distress.     Appearance: He is not ill-appearing or diaphoretic.   HENT:      Mouth/Throat:      Mouth: Mucous membranes are moist.   Eyes:      General: No scleral icterus.  Cardiovascular:      Rate and Rhythm: Normal rate and regular rhythm.      Heart sounds: No murmur heard.  No gallop.    Pulmonary:      Effort: Pulmonary effort is normal. No respiratory distress.      Breath sounds: No wheezing or rhonchi.   Abdominal:      General: There is no distension.      Palpations: Abdomen is soft. There is no mass.      Tenderness: There is no abdominal tenderness. There is no guarding.   Skin:     Comments: Intertriginous erythema of the groin   Neurological:      Mental Status: He is alert.         Results Review     I reviewed the patient's new clinical results.  Results from last 7 days   Lab Units 21  0720 10/30/21  0748 10/29/21  0846   WBC 10*3/mm3 10.62 13.13* 17.86*   HEMOGLOBIN g/dL 11.9* 10.8* 13.4   PLATELETS 10*3/mm3 486* 375 424     Results from last 7 days   Lab Units 21  0720 10/30/21  0748 10/29/21  0846   SODIUM mmol/L 139 135* 134*   POTASSIUM mmol/L 4.0 3.4* 4.1   CHLORIDE mmol/L 106 101 94*   CO2 mmol/L 23.6 26.3  26.8   BUN mg/dL 10 10 42*   CREATININE mg/dL 1.05 0.74* 0.97   GLUCOSE mg/dL 124* 115* 118*   Estimated Creatinine Clearance: 97.6 mL/min (by C-G formula based on SCr of 1.05 mg/dL).  Results from last 7 days   Lab Units 11/01/21  0720 10/29/21  0846   ALBUMIN g/dL 3.70 4.60   BILIRUBIN mg/dL 0.2 0.9   ALK PHOS U/L 69 93   AST (SGOT) U/L 24 56*   ALT (SGPT) U/L 21 32     Results from last 7 days   Lab Units 11/01/21  0720 10/30/21  0748 10/29/21  0846   CALCIUM mg/dL 9.0 7.9* 9.1   ALBUMIN g/dL 3.70  --  4.60   MAGNESIUM mg/dL 2.0  --  2.7*   PHOSPHORUS mg/dL 2.8 1.4*  --      Results from last 7 days   Lab Units 10/29/21  2140   PROCALCITONIN ng/mL 0.33*     COVID19   Date Value Ref Range Status   10/29/2021 Not Detected Not Detected - Ref. Range Final   09/13/2021 Not Detected Not Detected - Ref. Range Final     No results found for: HGBA1C, POCGLU    CT Abdomen Pelvis With Contrast  CT ABDOMEN PELVIS W CONTRAST-     Radiation dose reduction techniques were utilized, including automated  exposure control and exposure modulation based on body size.     Clinical: 56-year-old male with upper abdominal pain     COMPARISON CT of the abdomen and pelvis 9/13/2021     FINDINGS: There is again wall thickening of the distal esophagus however  the amount has diminished since 9/13/2021. The liver, gallbladder and  pancreas are satisfactory in appearance, no biliary duct dilatation.  Both adrenal glands are normal. The spleen has been removed.     There is a tiny right renal cortical cysts, the kidneys are otherwise  normal, no calculus nor obstructive uropathy. The urinary bladder is  satisfactory in appearance. Prostate and seminal vesicles within normal  limits.     The appendix, colon and small bowel are satisfactory in appearance,  stomach within normal limits. No duodenal abnormality seen. Diameter of  the aorta is within normal limits.     CONCLUSION:  1. There is thickening of the distal esophagus however the degree  has  considerably improved since 9/13/2021.  2. No acute intra-abdominal abnormality.        This report was finalized on 10/29/2021 12:22 PM by Dr. Uzair Ortega M.D.     XR Chest 1 View  Narrative: CHEST SINGLE VIEW     HISTORY: Abdominal pain for 2 days. Drug use.     COMPARISON: None     FINDINGS: The heart and mediastinal structures are within normal limits.  Lungs appear clear and there is no evidence for pulmonary edema or  pleural effusion or infiltrate.Cardiac monitoring leads are noted.     Impression: No evidence for active disease in the chest.     This report was finalized on 10/29/2021 10:25 AM by Dr. Vishal Barber M.D.       I reviewed the patient's daily medications.  Scheduled Medications  apixaban, 5 mg, Oral, Q12H  cephalexin, 500 mg, Oral, Q6H  hydrocortisone-bacitracin-zinc oxide-nystatin, 1 application, Topical, BID  ketoconazole, 1 application, Topical, Q24H  multivitamin with minerals, 1 tablet, Oral, Daily  pantoprazole, 40 mg, Oral, BID AC  QUEtiapine, 300 mg, Oral, Nightly  senna-docusate sodium, 2 tablet, Oral, Daily  sucralfate, 1 g, Oral, TID AC    Infusions   Diet  Diet Soft Texture; Ground; Thin       Assessment/Plan     Active Hospital Problems    Diagnosis  POA   • **Cellulitis [L03.90]  Yes   • Hypophosphatemia [E83.39]  Yes   • Intertriginous candidiasis [B37.2]  Yes   • Bipolar disorder (HCC) [F31.9]  Yes   • Homeless [Z59.00]  Not Applicable   • Substance use disorder [F19.90]  Yes   • Overweight [E66.3]  Yes   • Hypokalemia [E87.6]  Yes   • Hyponatremia [E87.1]  Yes   • History of DVT (deep vein thrombosis) [Z86.718]  Not Applicable      Resolved Hospital Problems   No resolved problems to display.       56 y.o. male with history of polysubstance use who presented for acute onset abdominal pain.  CT abdomen on admission did not show any acute pathology.  He was admitted with sepsis due to nonpurulent cellulitis.    Sepsis due to nonpurulent cellulitis  -he may have an  overlying bacterial cellulitis on top of intertriginous candidal infection of his groin.  He had elevated temperatures on admission (T-max 100.2) and leukocytosis. Treated with 5 days abx.  -Blood cultures positive 1 out of 2 coag negative staph.  Contaminant.    Intertriginous candida, groin  -Continue ketoconazole for 14 days (end 11/13).  Wound care consult.  It is improving    Polysubstance use disorder  -UDS on admission positive for amphetamines.  Patient reports daily alcohol use since discharge from [facility]  -Access evaluated  -Monitored on CIWA for 72 hours, no signs or symptoms of withdrawal.  Stop CIWA protocol     Bipolar disorder  -Continue nightly Seroquel. Seen by psych    Hypokalemia, hypophosphatemia: Resolved.  Due to overall poor nutritional status.  Replaced per protocol.    Hyponatremia: Likely due to recent poor p.o. intake.  Resolved  History of DVT: Home Eliquis      · Eliquis (home med) for DVT prophylaxis.  · Full code.  · Discussed with patient.  · Anticipate discharge: PT has recommended SNF and he is agreeable.  He is medically ready for discharge when an accepting facility is available      Awa Combs DO  [facility]  11/03/21  13:03 EDT    Patient was placed in face mask on first look.  I wore protective equipment throughout this patient encounter including a face mask, gloves and protective eyewear.  Hand hygiene was performed before donning protective equipment and after removal when leaving the room.

## 2021-11-03 NOTE — NURSING NOTE
[facility] follow-up.  Per RN, patient paranoid mentioned his phone being tapped.  Upon entering room, patient is awake and alert RIB, he is pleasant.  He reports feeling tired and anxious related health issues.  He is future oriented wants to get better and his life together.  No SI.  He is hoping to get in to rehab/SNF doesn't want to be homeless after discharge.    Dr. Rg has signed off per his note dated 11/1; if needed a new consult for him will need to be placed.  AC following.

## 2021-11-03 NOTE — PLAN OF CARE
Goal Outcome Evaluation:         Pt Alert. Pt medicated per orders. Pt had complaints of left foot pain. Pt had no SOA throughout shift. Pt HR within normal limits. Pt O2 Within normal limits. Pt BP within normal limits. Pt seems to be distrustful of hospital and staff. Pt seems to he hallucinating and speaking to other people within his room and states he hears voices. Pt had complaints of arm pain where IV was. IV removed and cold compress applied to right arm.

## 2021-11-03 NOTE — CASE MANAGEMENT/SOCIAL WORK
Continued Stay Note  [facility]     Patient Name: David Villalta  MRN: [id number]  Today's Date: 11/3/2021    Admit Date: 10/29/2021     Discharge Plan     Row Name 11/03/21 1205       Plan    Plan Plan skilled care at accepting facility- pre cert needed.   COLEEN Vaughn RN    Patient/Family in Agreement with Plan yes    Plan Comments Per [facility], [facility], [facility], [facility], [facility], [facility], [place], and [facility] have declined pt.  Called to Aurea ( [phone]) to inquire if any [facility] facilities can accept pt.  Called to Tiff Caballero  ( [phone])  to inquire if any [facility] facilities can accept pt.   Called Samantha ( [phone])  to inquire if any [facility] facilities can accept pt.  Plan skilled care at accepting facility- pre cert needed.   COLEEN Vaughn RN               Discharge Codes    No documentation.               Expected Discharge Date and Time     Expected Discharge Date Expected Discharge Time    Nov 4, 2021             Mirtha Vaughn, RN

## 2021-11-03 NOTE — PLAN OF CARE
Goal Outcome Evaluation:  Plan of Care Reviewed With: patient           Outcome Summary: Pt has been very pleasant today.  PP given as ordered for generalized pain.  VSS.  Waiting for placement to Rehab.  Will cont to monitor.

## 2021-11-03 NOTE — CASE MANAGEMENT/SOCIAL WORK
Continued Stay Note  [facility]     Patient Name: David Villalta  MRN: [id number]  Today's Date: 11/3/2021    Admit Date: 10/29/2021     Discharge Plan     Row Name 11/03/21 1504       Plan    Plan Plan skilled care at accepting facility- pre cert needed.   COLEEN Vaughn RN    Plan Comments Per Sophia  ( [phone]) [facility] facilities cannot accept pt.    Plan skilled care at accepting facility- pre cert needed.   COLEEN Vaughn RN    Row Name 11/03/21 1245       Plan    Plan Plan skilled care at accepting facility- pre cert needed.   COLEEN Vaughn RN    Patient/Family in Agreement with Plan yes    Plan Comments Per Agusto  ( [phone]) [facility].  Sophia  [phone]) called to inquire if any [facility] facility can accept pt.  Plan skilled care at accepting facility- pre cert needed.   COLEEN Vaughn RN               Discharge Codes    No documentation.               Expected Discharge Date and Time     Expected Discharge Date Expected Discharge Time    Nov 4, 2021             Mirtha Vaughn RN

## 2021-11-04 RX ORDER — IBUPROFEN 600 MG/1
600 TABLET ORAL EVERY 8 HOURS PRN
Status: DISCONTINUED | OUTPATIENT
Start: 2021-11-04 | End: 2021-11-06 | Stop reason: HOSPADM

## 2021-11-04 RX ADMIN — QUETIAPINE FUMARATE 300 MG: 100 TABLET ORAL at 20:22

## 2021-11-04 RX ADMIN — NYSTATIN 1 APPLICATION: 100000 OINTMENT TOPICAL at 20:22

## 2021-11-04 RX ADMIN — KETOCONAZOLE 1 APPLICATION: 20 CREAM TOPICAL at 08:26

## 2021-11-04 RX ADMIN — SUCRALFATE 1 G: 1 TABLET ORAL at 17:53

## 2021-11-04 RX ADMIN — PANTOPRAZOLE SODIUM 40 MG: 40 TABLET, DELAYED RELEASE ORAL at 17:53

## 2021-11-04 RX ADMIN — SUCRALFATE 1 G: 1 TABLET ORAL at 11:57

## 2021-11-04 RX ADMIN — APIXABAN 5 MG: 5 TABLET, FILM COATED ORAL at 20:22

## 2021-11-04 RX ADMIN — APIXABAN 5 MG: 5 TABLET, FILM COATED ORAL at 08:25

## 2021-11-04 RX ADMIN — Medication 1 TABLET: at 08:25

## 2021-11-04 RX ADMIN — NYSTATIN 1 APPLICATION: 100000 OINTMENT TOPICAL at 08:25

## 2021-11-04 RX ADMIN — HYDROCODONE BITARTRATE AND ACETAMINOPHEN 1 TABLET: 7.5; 325 TABLET ORAL at 08:25

## 2021-11-04 RX ADMIN — IBUPROFEN 600 MG: 600 TABLET, FILM COATED ORAL at 17:52

## 2021-11-04 RX ADMIN — SUCRALFATE 1 G: 1 TABLET ORAL at 06:44

## 2021-11-04 RX ADMIN — PANTOPRAZOLE SODIUM 40 MG: 40 TABLET, DELAYED RELEASE ORAL at 06:44

## 2021-11-04 NOTE — PLAN OF CARE
Goal Outcome Evaluation:  Plan of Care Reviewed With: patient           Outcome Summary: Pt to be transferred to  before shift is up. Waiting for placement, if possible.  Pt refused PT today because he said that he did not need any PT.  Pt got up to take a shower without issue, Pt has been up at carlos.  Pain med given as ordered.  VSS.  Will cont to monitor.

## 2021-11-04 NOTE — PROGRESS NOTES
Dedicated to Hospital Care    [phone]   LOS: 4 days     Name: David Villalta  Age/Sex: 56 y.o. male  :  1965        PCP: Provider, No Known  Chief Complaint   Patient presents with   • Abdominal Pain      Subjective   Denies new issues or complaints today resting comfortably in bed.  Pain is controlled.  General: No Fever or Chills, Cardiac: No Chest Pain or Palpitations, Resp: No Cough or SOA, GI: No Nausea, Vomiting, or Diarrhea and Other: No bleeding    apixaban, 5 mg, Oral, Q12H  hydrocortisone-bacitracin-zinc oxide-nystatin, 1 application, Topical, BID  ketoconazole, 1 application, Topical, Q24H  multivitamin with minerals, 1 tablet, Oral, Daily  pantoprazole, 40 mg, Oral, BID AC  QUEtiapine, 300 mg, Oral, Nightly  senna-docusate sodium, 2 tablet, Oral, Daily  sucralfate, 1 g, Oral, TID AC           Objective   Vital Signs  Temp:  [98.3 °F (36.8 °C)-98.4 °F (36.9 °C)] 98.4 °F (36.9 °C)  Heart Rate:  [71-78] 71  Resp:  [16] 16  BP: (104-113)/(71-90) 104/90  Body mass index is 27.3 kg/m².    Intake/Output Summary (Last 24 hours) at 2021 1350  Last data filed at 2021 0825  Gross per 24 hour   Intake 360 ml   Output 2000 ml   Net -1640 ml       Physical Exam  Vitals and nursing note reviewed.   Constitutional:       General: He is not in acute distress.     Appearance: Normal appearance.   Cardiovascular:      Rate and Rhythm: Normal rate and regular rhythm.   Pulmonary:      Effort: Pulmonary effort is normal.      Breath sounds: Normal breath sounds.   Abdominal:      General: Bowel sounds are normal.      Palpations: Abdomen is soft.   Skin:     General: Skin is warm and dry.      Comments: He is covered in tattoos the most remarkable which is on his forehead.   Neurological:      Mental Status: He is alert.           Results Review:       I reviewed the patient's new clinical results.  Results from last 7 days   Lab Units 21  0720 10/30/21  0748 10/29/21  0846   WBC 10*3/mm3 10.62  13.13* 17.86*   HEMOGLOBIN g/dL 11.9* 10.8* 13.4   PLATELETS 10*3/mm3 486* 375 424     Results from last 7 days   Lab Units 11/01/21  0720 10/30/21  0748 10/29/21  0846   SODIUM mmol/L 139 135* 134*   POTASSIUM mmol/L 4.0 3.4* 4.1   CHLORIDE mmol/L 106 101 94*   CO2 mmol/L 23.6 26.3 26.8   BUN mg/dL 10 10 42*   CREATININE mg/dL 1.05 0.74* 0.97   CALCIUM mg/dL 9.0 7.9* 9.1   MAGNESIUM mg/dL 2.0  --  2.7*   PHOSPHORUS mg/dL 2.8 1.4*  --    Estimated Creatinine Clearance: 98.2 mL/min (by C-G formula based on SCr of 1.05 mg/dL).      Assessment/Plan   Active Hospital Problems    Diagnosis  POA   • **Cellulitis [L03.90]  Yes   • Hypophosphatemia [E83.39]  Yes   • Intertriginous candidiasis [B37.2]  Yes   • Bipolar disorder (HCC) [F31.9]  Yes   • Homeless [Z59.00]  Not Applicable   • Substance use disorder [F19.90]  Yes   • Overweight [E66.3]  Yes   • Hypokalemia [E87.6]  Yes   • Hyponatremia [E87.1]  Yes   • History of DVT (deep vein thrombosis) [Z86.718]  Not Applicable      Resolved Hospital Problems   No resolved problems to display.       PLAN  56 y.o. male with history of polysubstance use who presented for acute onset abdominal pain.  CT abdomen on admission did not show any acute pathology.  He was admitted with sepsis due to nonpurulent cellulitis.     Sepsis due to nonpurulent cellulitis (resolved)  -he may have an overlying bacterial cellulitis on top of intertriginous candidal infection of his groin.  He had elevated temperatures on admission (T-max 100.2) and leukocytosis. Treated with 5 days abx.  -Blood cultures positive 1 out of 2 coag negative staph.  Contaminant.     Intertriginous candida, groin  -Continue ketoconazole for 14 days (end 11/13).  Wound care consult.  It is improving     Polysubstance use disorder  -UDS on admission positive for amphetamines.  Patient reports daily alcohol use since discharge from [facility]  -Access evaluated  -Monitored on CIWA for 72 hours, no signs or symptoms  of withdrawal.  Stop CIWA protocol      Bipolar disorder  -Continue nightly Seroquel. Seen by psych    Abdominal pain and multiple pain complaints  -Work-up negative here in the hospital.  Most of his pain complaints are chronic.  I see no need to continue narcotic pain medications.  We will recommend Tylenol.  We will also start ibuprofen as needed.    Hypokalemia, hypophosphatemia: Resolved.  Due to overall poor nutritional status.  Replaced per protocol.    Hyponatremia: Likely due to recent poor p.o. intake.  Resolved  History of DVT: Home Eliquis, will order lower extremity Doppler to evaluate.  He was not taking this medication at home anyway.  I am not sure we need to continue this especially in a patient whose is unreliable as he is.        · Eliquis (home med) for DVT prophylaxis.  · Full code        Disposition  Awaiting safe DC plan      Roger Adams MD  [facility]  11/04/21  13:50 EDT

## 2021-11-04 NOTE — PLAN OF CARE
Goal Outcome Evaluation:            Pt. Alert, oriented x4, Pt. Took shower self, shaving self, being on isolated for contact precautions, no voiced c/o pain to groin , no c/o mouth sore, ambulatory, using bathroom self in his room, sleeping in bed quietly at this shift, no behavior issues noted until this time, no s/s acute distress noted

## 2021-11-05 ENCOUNTER — APPOINTMENT (OUTPATIENT)
Dept: CARDIOLOGY | Facility: HOSPITAL | Age: 56
End: 2021-11-05

## 2021-11-05 LAB
BH CV LOW VAS LEFT POPLITEAL SPONT: 1
BH CV LOW VAS RIGHT COMMON FEMORAL SPONT: 1
BH CV LOW VAS RIGHT POSTERIOR TIBIAL VESSEL: 1
BH CV LOWER VASCULAR LEFT COMMON FEMORAL AUGMENT: NORMAL
BH CV LOWER VASCULAR LEFT COMMON FEMORAL COMPETENT: NORMAL
BH CV LOWER VASCULAR LEFT COMMON FEMORAL COMPRESS: NORMAL
BH CV LOWER VASCULAR LEFT COMMON FEMORAL PHASIC: NORMAL
BH CV LOWER VASCULAR LEFT COMMON FEMORAL SPONT: NORMAL
BH CV LOWER VASCULAR LEFT DISTAL FEMORAL COMPRESS: NORMAL
BH CV LOWER VASCULAR LEFT GASTRONEMIUS COMPRESS: NORMAL
BH CV LOWER VASCULAR LEFT GREATER SAPH AK COMPRESS: NORMAL
BH CV LOWER VASCULAR LEFT GREATER SAPH BK COMPRESS: NORMAL
BH CV LOWER VASCULAR LEFT LESSER SAPH COMPRESS: NORMAL
BH CV LOWER VASCULAR LEFT MID FEMORAL AUGMENT: NORMAL
BH CV LOWER VASCULAR LEFT MID FEMORAL COMPETENT: NORMAL
BH CV LOWER VASCULAR LEFT MID FEMORAL COMPRESS: NORMAL
BH CV LOWER VASCULAR LEFT MID FEMORAL PHASIC: NORMAL
BH CV LOWER VASCULAR LEFT MID FEMORAL SPONT: NORMAL
BH CV LOWER VASCULAR LEFT PERONEAL COMPRESS: NORMAL
BH CV LOWER VASCULAR LEFT POPLITEAL AUGMENT: NORMAL
BH CV LOWER VASCULAR LEFT POPLITEAL COMPETENT: NORMAL
BH CV LOWER VASCULAR LEFT POPLITEAL COMPRESS: NORMAL
BH CV LOWER VASCULAR LEFT POPLITEAL PHASIC: NORMAL
BH CV LOWER VASCULAR LEFT POPLITEAL SPONT: NORMAL
BH CV LOWER VASCULAR LEFT POSTERIOR TIBIAL COMPRESS: NORMAL
BH CV LOWER VASCULAR LEFT PROFUNDA FEMORAL COMPRESS: NORMAL
BH CV LOWER VASCULAR LEFT PROXIMAL FEMORAL COMPRESS: NORMAL
BH CV LOWER VASCULAR LEFT SAPHENOFEMORAL JUNCTION COMPRESS: NORMAL
BH CV LOWER VASCULAR RIGHT COMMON FEMORAL AUGMENT: NORMAL
BH CV LOWER VASCULAR RIGHT COMMON FEMORAL COMPETENT: NORMAL
BH CV LOWER VASCULAR RIGHT COMMON FEMORAL COMPRESS: NORMAL
BH CV LOWER VASCULAR RIGHT COMMON FEMORAL PHASIC: NORMAL
BH CV LOWER VASCULAR RIGHT COMMON FEMORAL SPONT: NORMAL
BH CV LOWER VASCULAR RIGHT DISTAL FEMORAL COMPRESS: NORMAL
BH CV LOWER VASCULAR RIGHT GASTRONEMIUS COMPRESS: NORMAL
BH CV LOWER VASCULAR RIGHT GREATER SAPH AK COMPRESS: NORMAL
BH CV LOWER VASCULAR RIGHT GREATER SAPH BK COMPRESS: NORMAL
BH CV LOWER VASCULAR RIGHT LESSER SAPH COMPRESS: NORMAL
BH CV LOWER VASCULAR RIGHT MID FEMORAL AUGMENT: NORMAL
BH CV LOWER VASCULAR RIGHT MID FEMORAL COMPETENT: NORMAL
BH CV LOWER VASCULAR RIGHT MID FEMORAL COMPRESS: NORMAL
BH CV LOWER VASCULAR RIGHT MID FEMORAL PHASIC: NORMAL
BH CV LOWER VASCULAR RIGHT MID FEMORAL SPONT: NORMAL
BH CV LOWER VASCULAR RIGHT PERONEAL COMPRESS: NORMAL
BH CV LOWER VASCULAR RIGHT POPLITEAL AUGMENT: NORMAL
BH CV LOWER VASCULAR RIGHT POPLITEAL COMPETENT: NORMAL
BH CV LOWER VASCULAR RIGHT POPLITEAL COMPRESS: NORMAL
BH CV LOWER VASCULAR RIGHT POPLITEAL PHASIC: NORMAL
BH CV LOWER VASCULAR RIGHT POPLITEAL SPONT: NORMAL
BH CV LOWER VASCULAR RIGHT POSTERIOR TIBIAL COMPRESS: NORMAL
BH CV LOWER VASCULAR RIGHT POSTERIOR TIBIAL THROMBUS: NORMAL
BH CV LOWER VASCULAR RIGHT PROFUNDA FEMORAL COMPRESS: NORMAL
BH CV LOWER VASCULAR RIGHT PROXIMAL FEMORAL COMPRESS: NORMAL
BH CV LOWER VASCULAR RIGHT SAPHENOFEMORAL JUNCTION COMPRESS: NORMAL
MAXIMAL PREDICTED HEART RATE: 166 BPM
STRESS TARGET HR: 141 BPM

## 2021-11-05 PROCEDURE — 93970 EXTREMITY STUDY: CPT

## 2021-11-05 RX ORDER — KETOCONAZOLE 20 MG/G
1 CREAM TOPICAL
Qty: 15 G | Refills: 0 | Status: SHIPPED | OUTPATIENT
Start: 2021-11-06 | End: 2021-11-20

## 2021-11-05 RX ADMIN — NYSTATIN 1 APPLICATION: 100000 OINTMENT TOPICAL at 20:19

## 2021-11-05 RX ADMIN — PANTOPRAZOLE SODIUM 40 MG: 40 TABLET, DELAYED RELEASE ORAL at 17:30

## 2021-11-05 RX ADMIN — SUCRALFATE 1 G: 1 TABLET ORAL at 09:09

## 2021-11-05 RX ADMIN — QUETIAPINE FUMARATE 300 MG: 100 TABLET ORAL at 20:19

## 2021-11-05 RX ADMIN — SUCRALFATE 1 G: 1 TABLET ORAL at 17:30

## 2021-11-05 RX ADMIN — NYSTATIN 1 APPLICATION: 100000 OINTMENT TOPICAL at 09:10

## 2021-11-05 RX ADMIN — DOCUSATE SODIUM 50MG AND SENNOSIDES 8.6MG 2 TABLET: 8.6; 5 TABLET, FILM COATED ORAL at 09:09

## 2021-11-05 RX ADMIN — APIXABAN 5 MG: 5 TABLET, FILM COATED ORAL at 20:19

## 2021-11-05 RX ADMIN — Medication 1 TABLET: at 09:09

## 2021-11-05 RX ADMIN — APIXABAN 5 MG: 5 TABLET, FILM COATED ORAL at 09:09

## 2021-11-05 RX ADMIN — SUCRALFATE 1 G: 1 TABLET ORAL at 12:52

## 2021-11-05 RX ADMIN — PANTOPRAZOLE SODIUM 40 MG: 40 TABLET, DELAYED RELEASE ORAL at 09:09

## 2021-11-05 RX ADMIN — IBUPROFEN 600 MG: 600 TABLET, FILM COATED ORAL at 22:06

## 2021-11-05 RX ADMIN — ACETAMINOPHEN 650 MG: 325 TABLET, FILM COATED ORAL at 09:08

## 2021-11-05 NOTE — DISCHARGE SUMMARY
Patient Name: David Villalta  : 1965  MRN: [id number]    Date of Admission: 10/29/2021  Date of Discharge:  2021  Primary Care Physician: Provider, No Known      Chief Complaint:   Abdominal Pain      Discharge Diagnoses     Active Hospital Problems    Diagnosis  POA   • **Cellulitis [L03.90]  Yes   • Hypophosphatemia [E83.39]  Yes   • Intertriginous candidiasis [B37.2]  Yes   • Bipolar disorder (HCC) [F31.9]  Yes   • Homeless [Z59.00]  Not Applicable   • Substance use disorder [F19.90]  Yes   • Overweight [E66.3]  Yes   • Hypokalemia [E87.6]  Yes   • Hyponatremia [E87.1]  Yes   • History of DVT (deep vein thrombosis) [Z86.718]  Not Applicable      Resolved Hospital Problems   No resolved problems to display.        Hospital Course     Mr. Villalta is a 56 y.o. male with a history of polysubstance use and homelessness who [facility] complaining of abdominal pain.  Please see the admitting history and physical for further details.  He was found to have abdominal pain and cellulitis and was admitted to the hospital for further evaluation and treatment.  He was started on IV antibiotics and imaging was performed on his abdomen.  CT abdomen pelvis did not show any acute pathology.  His abdomen pain improved with hydration and supportive care.  He was treated with 5 days of IV cefazolin here in the hospital and cellulitis is resolved.  He also had intertrigo in his groin that was treated with ketoconazole cream and improved.  He has underlying bipolar disorder and schizophrenia and psychiatry did evaluate him here in the hospital they recommended continuing current management and follow-up outpatient.  He currently poses no danger to himself or others.  Physical therapy worked with him here in the hospital and at this point the plan is to discharge him to an extended stay hotel.  His medications have been ordered for discharge.  I was a little concerned about this patient  being on a blood thinner but does have a history of DVT.  I did repeat his ultrasound here as he is 6 months out from that diagnosis but continues to have a significant chronic DVT and would be at risk for DVTs in the future given his venous incompetence.  I recommend continuing this medication for the time being but I am not sure how compliant he is with it in the outpatient setting.  Otherwise the plans been discussed with the patient and his guardian and the plan is arranged for him to discharge tomorrow at 11.      Day of Discharge     Subjective:  He feels pretty good today denies new issues or complaints pain is controlled    Physical Exam:  Temp:  [97 °F (36.1 °C)-98 °F (36.7 °C)] 97.6 °F (36.4 °C)  Heart Rate:  [59-83] 59  Resp:  [16] 16  BP: (115-141)/(70-83) 125/79  Body mass index is 27.3 kg/m².  Physical Exam  Vitals and nursing note reviewed.   Constitutional:       Appearance: Normal appearance.   Cardiovascular:      Rate and Rhythm: Normal rate and regular rhythm.   Pulmonary:      Effort: No respiratory distress.      Breath sounds: Normal breath sounds.   Neurological:      General: No focal deficit present.      Mental Status: He is alert and oriented to person, place, and time.         Consultants     Consult Orders (all) (From admission, onward)     Start     Ordered    10/30/21 1536  Inpatient Psychiatrist Consult  Once        Specialty:  Psychiatry  Provider:  Jass Rg III, MD    10/30/21 1535    10/30/21 1534  Inpatient Case Management  Consult  Once        Provider:  (Not yet assigned)    10/30/21 1534    10/30/21 1532  Inpatient Access Center Consult  Once        Provider:  (Not yet assigned)    10/30/21 1531    10/29/21 1806  Inpatient Case Management  Consult  Once        Provider:  (Not yet assigned)    10/29/21 1806    10/29/21 1325  [facility] (on-call MD unless specified) Details  Once        Specialty:  Hospitalist  Provider:  (Not yet  assigned)    10/29/21 1324              Procedures     * Surgery not found *      Imaging Results (All)     Procedure Component Value Units Date/Time    CT Abdomen Pelvis With Contrast [[id number]] Collected: 10/29/21 1217     Updated: 10/29/21 1225    Narrative:      CT ABDOMEN PELVIS W CONTRAST-     Radiation dose reduction techniques were utilized, including automated  exposure control and exposure modulation based on body size.     Clinical: 56-year-old male with upper abdominal pain     COMPARISON CT of the abdomen and pelvis 9/13/2021     FINDINGS: There is again wall thickening of the distal esophagus however  the amount has diminished since 9/13/2021. The liver, gallbladder and  pancreas are satisfactory in appearance, no biliary duct dilatation.  Both adrenal glands are normal. The spleen has been removed.     There is a tiny right renal cortical cysts, the kidneys are otherwise  normal, no calculus nor obstructive uropathy. The urinary bladder is  satisfactory in appearance. Prostate and seminal vesicles within normal  limits.     The appendix, colon and small bowel are satisfactory in appearance,  stomach within normal limits. No duodenal abnormality seen. Diameter of  the aorta is within normal limits.     CONCLUSION:  1. There is thickening of the distal esophagus however the degree has  considerably improved since 9/13/2021.  2. No acute intra-abdominal abnormality.        This report was finalized on 10/29/2021 12:22 PM by Dr. Uzair Ortega M.D.       XR Chest 1 View [[id number]] Collected: 10/29/21 1019     Updated: 10/29/21 1028    Narrative:      CHEST SINGLE VIEW     HISTORY: Abdominal pain for 2 days. Drug use.     COMPARISON: None     FINDINGS: The heart and mediastinal structures are within normal limits.  Lungs appear clear and there is no evidence for pulmonary edema or  pleural effusion or infiltrate.Cardiac monitoring leads are noted.       Impression:      No evidence for active disease in  the chest.     This report was finalized on 10/29/2021 10:25 AM by Dr. Vishal Barber M.D.           Results for orders placed during the hospital encounter of 10/29/21    Duplex Venous Lower Extremity - Bilateral CAR    Interpretation Summary  · Chronic right lower extremity deep vein thrombosis noted in the posterior tibial.  · There was deep venous valvular incompetence noted in the right common femoral.  · There was deep venous valvular incompetence noted in the left popliteal.  · All other veins appeared normal bilaterally.      Pertinent Labs     Results from last 7 days   Lab Units 11/01/21  0720 10/30/21  0748   WBC 10*3/mm3 10.62 13.13*   HEMOGLOBIN g/dL 11.9* 10.8*   PLATELETS 10*3/mm3 486* 375     Results from last 7 days   Lab Units 11/01/21  0720 10/30/21  0748   SODIUM mmol/L 139 135*   POTASSIUM mmol/L 4.0 3.4*   CHLORIDE mmol/L 106 101   CO2 mmol/L 23.6 26.3   BUN mg/dL 10 10   CREATININE mg/dL 1.05 0.74*   GLUCOSE mg/dL 124* 115*   EGFR IF NONAFRICN AM mL/min/1.73 74 110     Results from last 7 days   Lab Units 11/01/21  0720   ALBUMIN g/dL 3.70   BILIRUBIN mg/dL 0.2   ALK PHOS U/L 69   AST (SGOT) U/L 24   ALT (SGPT) U/L 21     Results from last 7 days   Lab Units 11/01/21  0720 10/30/21  0748   CALCIUM mg/dL 9.0 7.9*   ALBUMIN g/dL 3.70  --    MAGNESIUM mg/dL 2.0  --    PHOSPHORUS mg/dL 2.8 1.4*               Invalid input(s): LDLCALC  Results from last 7 days   Lab Units 11/01/21  1307 11/01/21  0714 10/29/21  2016   BLOODCX  Staphylococcus, coagulase negative* No growth at 4 days  --    MRSAPCR   --   --  MRSA Detected*   BCIDPCR  Staph spp, not aureus or lugdunesis. Identification by BCID2 PCR.*  --   --      Results from last 7 days   Lab Units 10/29/21  1446   COVID19  Not Detected       Test Results Pending at Discharge     Pending Labs     Order Current Status    Blood Culture - Blood, Arm, Left Preliminary result          Discharge Details        Discharge Medications      New  Medications      Instructions Start Date   ketoconazole 2 % cream  Commonly known as: NIZORAL   1 application, Topical, Every 24 Hours Scheduled   Start Date: November 6, 2021        Continue These Medications      Instructions Start Date   Eliquis 5 MG tablet tablet  Generic drug: apixaban   5 mg, Oral, Every 12 Hours Scheduled      multivitamin with minerals tablet tablet  Generic drug: multivitamin with minerals   1 tablet, Oral, Daily      pantoprazole 40 MG EC tablet  Commonly known as: PROTONIX   40 mg, Oral, 2 Times Daily Before Meals      QUEtiapine 300 MG tablet  Commonly known as: SEROquel   300 mg, Oral, Nightly      sucralfate 1 g tablet  Commonly known as: CARAFATE   1 g, Oral, 3 Times Daily Before Meals             Allergies   Allergen Reactions   • Penicillin G Unknown - Low Severity       Discharge Disposition:  Home or Self Care      Discharge Diet:  Diet Order   Procedures   • Diet Soft Texture; Ground; Thin       Discharge Activity:   Activity Instructions     Activity as Tolerated            CODE STATUS:    Code Status and Medical Interventions:   Ordered at: 10/29/21 1738     Code Status (Patient has no pulse and is not breathing):    CPR (Attempt to Resuscitate)     Medical Interventions (Patient has pulse or is breathing):    Full       No future appointments.  Additional Instructions for the Follow-ups that You Need to Schedule     Discharge Follow-up with PCP   As directed       Currently Documented PCP:    Provider, No Known    PCP Phone Number:    [phone]     Follow Up Details: 3-4 weeks            Follow-up Information     Provider, No Known .    Why: 3-4 weeks  Contact information:  [place] [address]  [phone]                         Additional Instructions for the Follow-ups that You Need to Schedule     Discharge Follow-up with PCP   As directed       Currently Documented PCP:    Provider, No Known    PCP Phone Number:    [phone]     Follow Up  Details: 3-4 weeks           Time Spent on Discharge:  Greater than 30 minutes      Roger Adams MD  [facility]  11/05/21  13:20 EDT

## 2021-11-05 NOTE — PLAN OF CARE
Goal Outcome Evaluation:           Progress: improving  Outcome Summary: Venous doppler this a.m. Nothing new. Up ad carlos. Eating and voiding. D/C tomorrow at 11:00am. Guardian will bring clothes and pick him up. Rx for d/c in [facility] outside room. Needs Flu shot tomorrow prior to d/c. Contact precautions. Pleasant demeanor.

## 2021-11-05 NOTE — SIGNIFICANT NOTE
11/05/21 1322   OTHER   Discipline occupational therapist   Rehab Time/Intention   Session Not Performed other (see comments)  (Per chart, Pt up ad carlos and taking shower with little assist. No further acute OT needs.OT to s/o.)

## 2021-11-05 NOTE — NURSING NOTE
Patient asleep. Reviewed chart and spoke with RN. Given seroquel at HS. Patient waiting on placement. Pt is homeless.     Dr. Rg has signed off. Access will follow.

## 2021-11-05 NOTE — PROGRESS NOTES
Discharge Planning Assessment  [facility]     Patient Name: David Villalta  MRN: [id number]  Today's Date: 11/5/2021    Admit Date: 10/29/2021     Discharge Needs Assessment    No documentation.                Discharge Plan     Row Name 11/05/21 0944       Plan    Plan Comments The patient transferred to [facility] from [facility] on 11/4/21. Spoke with the Guardian/Xiang [phone] and he has a place for the patient to go to tomorrow at [facility] on [address] [employer] (weekly rental) and can pick him up at 11:00 AM tomorrow and provide the transportation for him. Notified MD. COLEEN Jarrell, RN, CCP.              Continued Care and Services - Admitted Since 10/29/2021     Destination     Service Provider Request Status Selected Services Address Phone Fax Patient Preferred    [facility]  Pending - Request Sent N/A [address] [facility] [address] [phone] --    [facility]  Pending - Request Sent N/A [address] [facility] [address] [phone] --    [facility]  Pending - Request Sent N/A [address] [facility] [address] [phone] --    [facility]  Pending - Request Sent N/A [address] [facility] [address] [phone] --    [facility]  Declined  Current or History of Substance Abuse N/A [address] [facility] [address] [phone] --    [facility]  Declined  Current or History of Substance Abuse N/A [address] [facility] [address] [phone] --    [facility]  Declined  Patient Denial N/A [address] [facility] [address] [facility] [address] [phone] --    [facility]  Declined  Patient Denial N/A [address] [facility] [address] [phone] --    [facility]  Declined  Patient Denial N/A [address] [facility] [id number] [phone] --    [facility]  [place]  Declined  [facility]/A [address] [address] [phone] --    [facility]  Declined  Clinical Denial N/A [address], [facility] [address] [phone] --    [facility]  Declined  Clinical Denial N/A [address] [address] [phone] --    [facility]  Declined  Current or History of Substance Abuse N/A [address] [id number] [phone] --    [facility]  Declined  Current or History of Substance Abuse N/A [address] [facility] [id number] [phone] --    [facility]  Declined  Current or History of Substance Abuse N/A [address] [facility] [facility] [id number] [phone] --    [facility]  Declined  Unable to Meet Patient Needs N/A [address] [facility] [address] [phone] --    [facility]  Declined  No Medicaid Bed Available N/A [address] [facility] [place] [address] [phone] --    [facility]  Declined  Clinical Denial N/A [address] [facility] [address] [phone] --    [facility]  Declined  Insurance Denial N/A [address], [facility] [address] [phone] --    [facility]  Declined  No Medicaid Bed Available N/A [address] [address] [phone] --              Expected Discharge Date and Time     Expected Discharge Date Expected Discharge Time    Nov 4, 2021          Demographic Summary    No documentation.                Functional Status    No documentation.                Psychosocial    No documentation.                Abuse/Neglect    No documentation.                Legal    No documentation.                 Substance Abuse    No documentation.                Patient Forms    No documentation.                   Ernestine Jarrell RN

## 2021-11-05 NOTE — PLAN OF CARE
Problem: Adult Inpatient Plan of Care  Goal: Plan of Care Review  Outcome: Ongoing, Progressing  Flowsheets (Taken 11/5/2021 9185)  Progress: improving  Plan of Care Reviewed With: patient  Outcome Summary: Patient took a shower as soon as he arrived to our unit. Patient slept between care. Denies pain. Patient is waiting for placement. VSS. Patient's guardian called to check on him and will bring him clothes today. Will continue to monitor funmilayo signs, labs and comfort.   Goal Outcome Evaluation:  Plan of Care Reviewed With: patient        Progress: improving  Outcome Summary: Patient took a shower as soon as he arrived to our unit. Patient slept between care. Denies pain. Patient is waiting for placement. VSS. Patient's guardian called to check on him and will bring him clothes today. Will continue to monitor funmilayo signs, labs and comfort.

## 2021-11-06 ENCOUNTER — READMISSION MANAGEMENT (OUTPATIENT)
Dept: CALL CENTER | Facility: HOSPITAL | Age: 56
End: 2021-11-06

## 2021-11-06 VITALS
DIASTOLIC BLOOD PRESSURE: 69 MMHG | OXYGEN SATURATION: 96 % | BODY MASS INDEX: 27.49 KG/M2 | HEIGHT: 70 IN | HEART RATE: 58 BPM | TEMPERATURE: 97.5 F | SYSTOLIC BLOOD PRESSURE: 108 MMHG | RESPIRATION RATE: 18 BRPM | WEIGHT: 192.02 LBS

## 2021-11-06 LAB — BACTERIA SPEC AEROBE CULT: NORMAL

## 2021-11-06 PROCEDURE — G0008 ADMIN INFLUENZA VIRUS VAC: HCPCS | Performed by: INTERNAL MEDICINE

## 2021-11-06 PROCEDURE — 90686 IIV4 VACC NO PRSV 0.5 ML IM: CPT | Performed by: INTERNAL MEDICINE

## 2021-11-06 PROCEDURE — 25010000002 INFLUENZA VAC SPLIT QUAD 0.5 ML SUSPENSION PREFILLED SYRINGE: Performed by: INTERNAL MEDICINE

## 2021-11-06 RX ADMIN — PANTOPRAZOLE SODIUM 40 MG: 40 TABLET, DELAYED RELEASE ORAL at 06:36

## 2021-11-06 RX ADMIN — Medication 1 TABLET: at 09:35

## 2021-11-06 RX ADMIN — SUCRALFATE 1 G: 1 TABLET ORAL at 06:36

## 2021-11-06 RX ADMIN — INFLUENZA VIRUS VACCINE 0.5 ML: 15; 15; 15; 15 SUSPENSION INTRAMUSCULAR at 09:36

## 2021-11-06 RX ADMIN — APIXABAN 5 MG: 5 TABLET, FILM COATED ORAL at 09:35

## 2021-11-06 RX ADMIN — NYSTATIN 1 APPLICATION: 100000 OINTMENT TOPICAL at 09:40

## 2021-11-06 NOTE — PLAN OF CARE
Problem: Adult Inpatient Plan of Care  Goal: Plan of Care Review  Outcome: Ongoing, Progressing  Flowsheets (Taken 11/6/2021 0415)  Progress: improving  Plan of Care Reviewed With: patient  Outcome Summary: pt slept well tonight, complained of pain x1 recieved IBU and tolerated well. Discharge today at 11am. meds to bed in [facility] to send home and wants a flu shot prior to leaving. will continue to monitor and treat per MD     Problem: Adult Inpatient Plan of Care  Goal: Patient-Specific Goal (Individualized)  Outcome: Ongoing, Progressing  Flowsheets (Taken 11/6/2021 0415)  Patient-Specific Goals (Include Timeframe): to go home  Individualized Care Needs: PRN and scheduled meds, Reg diet, up ad carlos

## 2021-11-07 NOTE — OUTREACH NOTE
Prep Survey      Responses   [facility] [place]   Is LACE score < 7 ? No   Emergency Room discharge w/ pulse ox? No   Eligibility Readm Mgmt   Discharge diagnosis Cellulitis   Does the patient have one of the following disease processes/diagnoses(primary or secondary)? Other   Does the patient have Home health ordered? No   Is there a DME ordered? No   Prep survey completed? Yes          Beth Chiang RN

## 2021-11-08 NOTE — PAYOR COMM NOTE
"DISCHARGED  REF #[id number]        Lily Paz (56 y.o. Male)             Date of Birth Social Security Number Address Home Phone MRN    1965  [address] [facility] [phone] [id number]    [facility] Mar[facility] Status             None Single       Admission Date Admission Type Admitting Provider Attending Provider Department, Room/Bed    10/29/21 Emergency Roger Adams MD  [address], P493/1    Discharge Date Discharge Disposition Discharge Destination          11/6/2021 Home or Self Care              Attending Provider: (none)   Allergies: Penicillin G    Isolation: None   Infection: MRSA (10/29/21)   Code Status: Prior   Advance Care Planning Activity    Ht: 177.8 cm (70\")   Wt: 87.1 kg (192 lb 0.3 oz)    Admission Cmt: None   Principal Problem: Cellulitis [L03.90]                 Active Insurance as of 10/29/2021     Primary Coverage     Payor Plan Insurance Group Employer/Plan Group    ANTHEM MEDICAID ANTHEM MEDICAID [id number]     Payor Plan Address Payor Plan Phone Number Payor Plan Fax Number Effective Dates    [address] [address] [phone]  9/1/2020 - None Entered    [facility] [id number]       Subscriber Name Subscriber Birth Date Member ID       LILY PAZ 1965 [id number]                 Emergency Contacts      (Rel.) Home Phone Work Phone Mobile Phone    Xiang Prakash (Guardian) -- -- [phone]            Discharge Order (From admission, onward)     Start     Ordered    11/05/21 1313  Discharge patient  Once        Expected Discharge Date: 11/06/21    Expected Discharge Time: 11:00    Discharge Disposition: Home or Self Care    Physician of Record for Attribution - Please select from Treatment Team: ROGER ADAMS [[id number]]    Review needed by CMO to determine Physician of Record: No       Question Answer Comment   Physician of Record for Attribution - Please select from Treatment Team ROGER ADAMS    Review " needed by CMO to determine Physician of Record No        11/05/21 2296

## 2021-11-09 ENCOUNTER — READMISSION MANAGEMENT (OUTPATIENT)
Dept: CALL CENTER | Facility: HOSPITAL | Age: 56
End: 2021-11-09

## 2021-11-09 NOTE — OUTREACH NOTE
Medical Week 1 Survey      Responses   [facility] patient discharged from? [place]   Does the patient have one of the following disease processes/diagnoses(primary or secondary)? Other   Week 1 attempt successful? No   Unsuccessful attempts Attempt 1          Estefania Jarrell RN

## 2021-11-12 ENCOUNTER — READMISSION MANAGEMENT (OUTPATIENT)
Dept: CALL CENTER | Facility: HOSPITAL | Age: 56
End: 2021-11-12

## 2021-11-12 NOTE — OUTREACH NOTE
Medical Week 1 Survey      Responses   [facility] patient discharged from? [place]   Does the patient have one of the following disease processes/diagnoses(primary or secondary)? Other   Week 1 attempt successful? No   Unsuccessful attempts Attempt 2  [ALYSSA VILLANUEVA (GUARDIAN) PREFERRED WE TRY PATIENT'S CELL AND PROVIDED THIS NUMBER. PATIENT DID NOT ANSWER. ]          Aishwarya Gordon LPN

## 2021-11-16 ENCOUNTER — READMISSION MANAGEMENT (OUTPATIENT)
Dept: CALL CENTER | Facility: HOSPITAL | Age: 56
End: 2021-11-16

## 2021-11-16 NOTE — OUTREACH NOTE
Medical Week 2 Survey      Responses   [facility] patient discharged from? [place]   Does the patient have one of the following disease processes/diagnoses(primary or secondary)? Other   Week 2 attempt successful? No   Unsuccessful attempts Attempt 1          Ebony Hector RN

## 2021-11-18 ENCOUNTER — READMISSION MANAGEMENT (OUTPATIENT)
Dept: CALL CENTER | Facility: HOSPITAL | Age: 56
End: 2021-11-18

## 2021-11-18 NOTE — OUTREACH NOTE
Medical Week 2 Survey      Responses   [facility] [place] patient discharged from? [place]   Does the patient have one of the following disease processes/diagnoses(primary or secondary)? Other   Week 2 attempt successful? No   Unsuccessful attempts Attempt 2          Shayy Valdez RN

## 2021-11-25 ENCOUNTER — APPOINTMENT (OUTPATIENT)
Dept: CT IMAGING | Facility: HOSPITAL | Age: 56
End: 2021-11-25

## 2021-11-25 ENCOUNTER — HOSPITAL ENCOUNTER (EMERGENCY)
Facility: HOSPITAL | Age: 56
Discharge: HOME | End: 2021-11-25
Attending: EMERGENCY MEDICINE | Admitting: EMERGENCY MEDICINE

## 2021-11-25 VITALS
OXYGEN SATURATION: 97 % | HEIGHT: 69 IN | DIASTOLIC BLOOD PRESSURE: 70 MMHG | WEIGHT: 155 LBS | RESPIRATION RATE: 18 BRPM | HEART RATE: 102 BPM | BODY MASS INDEX: 22.96 KG/M2 | TEMPERATURE: 99.6 F | SYSTOLIC BLOOD PRESSURE: 108 MMHG

## 2021-11-25 DIAGNOSIS — K29.80 DUODENITIS: Primary | ICD-10-CM

## 2021-11-25 LAB
ALBUMIN SERPL-MCNC: 4.5 G/DL (ref 3.5–5.2)
ALBUMIN/GLOB SERPL: 1.5 G/DL
ALP SERPL-CCNC: 96 U/L (ref 39–117)
ALT SERPL W P-5'-P-CCNC: 16 U/L (ref 1–41)
ANION GAP SERPL CALCULATED.3IONS-SCNC: 10.6 MMOL/L (ref 5–15)
AST SERPL-CCNC: 22 U/L (ref 1–40)
BASOPHILS # BLD AUTO: 0.04 10*3/MM3 (ref 0–0.2)
BASOPHILS NFR BLD AUTO: 0.3 % (ref 0–1.5)
BILIRUB SERPL-MCNC: 0.8 MG/DL (ref 0–1.2)
BILIRUB UR QL STRIP: NEGATIVE
BUN SERPL-MCNC: 13 MG/DL (ref 6–20)
BUN/CREAT SERPL: 18.1 (ref 7–25)
CALCIUM SPEC-SCNC: 9.3 MG/DL (ref 8.6–10.5)
CHLORIDE SERPL-SCNC: 100 MMOL/L (ref 98–107)
CLARITY UR: CLEAR
CO2 SERPL-SCNC: 25.4 MMOL/L (ref 22–29)
COLOR UR: YELLOW
CREAT SERPL-MCNC: 0.72 MG/DL (ref 0.76–1.27)
DEPRECATED RDW RBC AUTO: 45 FL (ref 37–54)
EOSINOPHIL # BLD AUTO: 0.25 10*3/MM3 (ref 0–0.4)
EOSINOPHIL NFR BLD AUTO: 1.8 % (ref 0.3–6.2)
ERYTHROCYTE [DISTWIDTH] IN BLOOD BY AUTOMATED COUNT: 14 % (ref 12.3–15.4)
ETHANOL BLD-MCNC: <10 MG/DL (ref 0–10)
ETHANOL UR QL: <0.01 %
GFR SERPL CREATININE-BSD FRML MDRD: 114 ML/MIN/1.73
GLOBULIN UR ELPH-MCNC: 3 GM/DL
GLUCOSE SERPL-MCNC: 108 MG/DL (ref 65–99)
GLUCOSE UR STRIP-MCNC: NEGATIVE MG/DL
HCT VFR BLD AUTO: 41.6 % (ref 37.5–51)
HGB BLD-MCNC: 13.5 G/DL (ref 13–17.7)
HGB UR QL STRIP.AUTO: NEGATIVE
IMM GRANULOCYTES # BLD AUTO: 0.03 10*3/MM3 (ref 0–0.05)
IMM GRANULOCYTES NFR BLD AUTO: 0.2 % (ref 0–0.5)
KETONES UR QL STRIP: ABNORMAL
LEUKOCYTE ESTERASE UR QL STRIP.AUTO: NEGATIVE
LIPASE SERPL-CCNC: 31 U/L (ref 13–60)
LYMPHOCYTES # BLD AUTO: 1.39 10*3/MM3 (ref 0.7–3.1)
LYMPHOCYTES NFR BLD AUTO: 9.9 % (ref 19.6–45.3)
MCH RBC QN AUTO: 28.8 PG (ref 26.6–33)
MCHC RBC AUTO-ENTMCNC: 32.5 G/DL (ref 31.5–35.7)
MCV RBC AUTO: 88.9 FL (ref 79–97)
MONOCYTES # BLD AUTO: 1.34 10*3/MM3 (ref 0.1–0.9)
MONOCYTES NFR BLD AUTO: 9.6 % (ref 5–12)
NEUTROPHILS NFR BLD AUTO: 10.98 10*3/MM3 (ref 1.7–7)
NEUTROPHILS NFR BLD AUTO: 78.2 % (ref 42.7–76)
NITRITE UR QL STRIP: NEGATIVE
NRBC BLD AUTO-RTO: 0 /100 WBC (ref 0–0.2)
PH UR STRIP.AUTO: 6 [PH] (ref 5–8)
PLATELET # BLD AUTO: 356 10*3/MM3 (ref 140–450)
PMV BLD AUTO: 9.4 FL (ref 6–12)
POTASSIUM SERPL-SCNC: 4 MMOL/L (ref 3.5–5.2)
PROT SERPL-MCNC: 7.5 G/DL (ref 6–8.5)
PROT UR QL STRIP: NEGATIVE
RBC # BLD AUTO: 4.68 10*6/MM3 (ref 4.14–5.8)
SODIUM SERPL-SCNC: 136 MMOL/L (ref 136–145)
SP GR UR STRIP: >=1.03 (ref 1–1.03)
UROBILINOGEN UR QL STRIP: ABNORMAL
WBC NRBC COR # BLD: 14.03 10*3/MM3 (ref 3.4–10.8)

## 2021-11-25 PROCEDURE — 96365 THER/PROPH/DIAG IV INF INIT: CPT

## 2021-11-25 PROCEDURE — 96361 HYDRATE IV INFUSION ADD-ON: CPT

## 2021-11-25 PROCEDURE — 83690 ASSAY OF LIPASE: CPT | Performed by: PHYSICIAN ASSISTANT

## 2021-11-25 PROCEDURE — 96374 THER/PROPH/DIAG INJ IV PUSH: CPT

## 2021-11-25 PROCEDURE — 80053 COMPREHEN METABOLIC PANEL: CPT | Performed by: PHYSICIAN ASSISTANT

## 2021-11-25 PROCEDURE — 81003 URINALYSIS AUTO W/O SCOPE: CPT | Performed by: PHYSICIAN ASSISTANT

## 2021-11-25 PROCEDURE — 25010000002 HYDROMORPHONE PER 4 MG: Performed by: EMERGENCY MEDICINE

## 2021-11-25 PROCEDURE — 85025 COMPLETE CBC W/AUTO DIFF WBC: CPT | Performed by: PHYSICIAN ASSISTANT

## 2021-11-25 PROCEDURE — 25010000002 IOPAMIDOL 61 % SOLUTION: Performed by: EMERGENCY MEDICINE

## 2021-11-25 PROCEDURE — 74177 CT ABD & PELVIS W/CONTRAST: CPT

## 2021-11-25 PROCEDURE — 96375 TX/PRO/DX INJ NEW DRUG ADDON: CPT

## 2021-11-25 PROCEDURE — 99283 EMERGENCY DEPT VISIT LOW MDM: CPT

## 2021-11-25 PROCEDURE — 82077 ASSAY SPEC XCP UR&BREATH IA: CPT | Performed by: PHYSICIAN ASSISTANT

## 2021-11-25 PROCEDURE — 25010000002 THIAMINE PER 100 MG: Performed by: PHYSICIAN ASSISTANT

## 2021-11-25 RX ORDER — FAMOTIDINE 10 MG/ML
20 INJECTION, SOLUTION INTRAVENOUS ONCE
Status: COMPLETED | OUTPATIENT
Start: 2021-11-25 | End: 2021-11-25

## 2021-11-25 RX ORDER — HYDROMORPHONE HYDROCHLORIDE 1 MG/ML
0.5 INJECTION, SOLUTION INTRAMUSCULAR; INTRAVENOUS; SUBCUTANEOUS ONCE
Status: COMPLETED | OUTPATIENT
Start: 2021-11-25 | End: 2021-11-25

## 2021-11-25 RX ORDER — SUCRALFATE 1 G/1
1 TABLET ORAL
Qty: 90 TABLET | Refills: 0 | Status: SHIPPED | OUTPATIENT
Start: 2021-11-25

## 2021-11-25 RX ORDER — PANTOPRAZOLE SODIUM 40 MG/1
40 TABLET, DELAYED RELEASE ORAL
Qty: 60 TABLET | Refills: 0 | Status: SHIPPED | OUTPATIENT
Start: 2021-11-25

## 2021-11-25 RX ADMIN — FAMOTIDINE 20 MG: 10 INJECTION INTRAVENOUS at 10:53

## 2021-11-25 RX ADMIN — THIAMINE HYDROCHLORIDE 100 MG: 100 INJECTION, SOLUTION INTRAMUSCULAR; INTRAVENOUS at 11:02

## 2021-11-25 RX ADMIN — IOPAMIDOL 85 ML: 612 INJECTION, SOLUTION INTRAVENOUS at 09:24

## 2021-11-25 RX ADMIN — FOLIC ACID 1 MG: 5 INJECTION, SOLUTION INTRAMUSCULAR; INTRAVENOUS; SUBCUTANEOUS at 10:54

## 2021-11-25 RX ADMIN — SODIUM CHLORIDE 1000 ML: 9 INJECTION, SOLUTION INTRAVENOUS at 08:42

## 2021-11-25 RX ADMIN — HYDROMORPHONE HYDROCHLORIDE 0.5 MG: 1 INJECTION, SOLUTION INTRAMUSCULAR; INTRAVENOUS; SUBCUTANEOUS at 11:00

## 2021-11-25 NOTE — ED TRIAGE NOTES
Pt picked up on [facility] bus reports abd. Pain, pt states consumed ETOH has hx of cirrhosis and hep c. Pt also states has not had BM in 1 week.     Pt arrives in triage with mask on. Triage staff wearing N95 masks and goggles.

## 2021-11-25 NOTE — ED PROVIDER NOTES
"EMERGENCY DEPARTMENT ENCOUNTER    Room Number: 02/02  Date seen: 11/25/2021  Time seen: 07:45 EST  PCP: Provider, No Known    Spoken Language:  English  Language interpretation services not needed     CHIEF COMPLAINT: abdominal pain    HPI: David Villalta is a 56 y.o. male with PMH of polysubstance abuse, hepatitis C, alcoholism and bipolar disease presenting to the ED for evaluation of abdominal pain. The history is being obtained by the patient and by review of the medical chart.  The patient presents complaining of abdominal pain which has been present for the past 2 days.  He states that the pain is generalized in the upper abdomen and radiates to the upper back.  Apparently, the patient was only talk best when he began complaining of abdominal pain and EMS was called for the patient to be brought to the hospital.  The patient denies any specific aggravating or relieving factors.  He states that he has not had anything to eat or drink over the past 2 days.  The patient admits to associated nausea but denies vomiting or diarrhea.  He states that he has been constipated over the past several days.  He denies fever, cough, chest pain or shortness of breath.  He describes the abdominal pain is \"sharp\" in nature.    MEDICAL RECORD REVIEW:  Reviewed in [facility].     PAST MEDICAL HISTORY  Past Medical History:   Diagnosis Date   • Anxiety    • Bipolar disorder (HCC)    • Depression    • Hepatitis C    • Hx of drug abuse (HCC)    • Hyponatremia 9/13/2021   • Leukocytosis 9/13/2021   • Pulmonary embolism (HCC)        PAST SURGICAL HISTORY  Past Surgical History:   Procedure Laterality Date   • ANKLE SURGERY     • ENDOSCOPY N/A 9/14/2021    Procedure: ESOPHAGOGASTRODUODENOSCOPY WITH COLD BIOPSIES;  Surgeon: Allyson Magaña MD;  Location: [facility] ENDOSCOPY;  Service: Gastroenterology;  Laterality: N/A;  PRE: HEMETEMESIS,  ESOPHAGEAL THICKENING  POST:ESOPHAGITIS, GASTRITIS, DUODENITIS, EROSION   • SPLENECTOMY         FAMILY " "HISTORY  No family history on file.    SOCIAL HISTORY  Social History     Socioeconomic History   • Marital status: Single   Tobacco Use   • Smoking status: Current Every Day Smoker     Packs/day: 0.50     Types: Cigarettes   • Smokeless tobacco: Never Used   Vaping Use   • Vaping Use: Some days   • Substances: Nicotine   • Devices: Disposable   Substance and Sexual Activity   • Alcohol use: Yes     Comment: \"goes ball to the wall, I drink excessively\"   • Drug use: Yes     Types: IV, Cocaine(coke), Methamphetamines     Comment: \"meth\"   • Sexual activity: Defer       CURRENT MEDICATIONS  Prior to Admission medications    Medication Sig Start Date End Date Taking? Authorizing Provider   apixaban (ELIQUIS) 5 MG tablet tablet Take 1 tablet by mouth Every 12 (Twelve) Hours. 9/15/21   Shyla Cordova APRN   multivitamin with minerals (MULTIVITAMIN ADULTS PO) Take 1 tablet by mouth Daily. 9/15/21   Shyla Cordova APRN   pantoprazole (PROTONIX) 40 MG EC tablet Take 1 tablet by mouth 2 (Two) Times a Day Before Meals. 9/15/21   Shyla Cordova APRN   QUEtiapine (SEROquel) 300 MG tablet Take 1 tablet by mouth Every Night. 9/15/21   Shyla Cordova APRN   sucralfate (CARAFATE) 1 g tablet Take 1 tablet by mouth 3 (Three) Times a Day Before Meals. 9/15/21   Shyla Cordova APRN       ALLERGIES  Penicillin g    REVIEW OF SYSTEMS  All systems reviewed and negative except for those discussed in HPI.     PHYSICAL EXAM  ED Triage Vitals   Temp Heart Rate Resp BP SpO2   11/25/21 0737 11/25/21 0738 11/25/21 0738 11/25/21 0738 11/25/21 0738   99.6 °F (37.6 °C) 102 18 150/90 98 %      Temp src Heart Rate Source Patient Position BP Location FiO2 (%)   -- -- -- -- --              Physical Exam  Constitutional:       Appearance: Normal appearance.   HENT:      Head: Normocephalic and atraumatic.      Mouth/Throat:      Mouth: Mucous membranes are moist.   Eyes:      Extraocular Movements: Extraocular " movements intact.      Pupils: Pupils are equal, round, and reactive to light.   Cardiovascular:      Rate and Rhythm: Normal rate and regular rhythm.   Pulmonary:      Effort: Pulmonary effort is normal.      Breath sounds: Normal breath sounds.   Abdominal:      General: There is no distension.      Palpations: Abdomen is soft.      Tenderness: There is generalized abdominal tenderness.      Comments: Large well-healed vertical midline incision in the abdomen from previous splenectomy   Musculoskeletal:      Cervical back: Normal range of motion.   Skin:     General: Skin is warm and dry.   Neurological:      General: No focal deficit present.      Mental Status: He is alert and oriented to person, place, and time.   Psychiatric:         Mood and Affect: Mood normal.         Behavior: Behavior normal.         Thought Content: Thought content normal.         Judgment: Judgment normal.         PROCEDURES  Procedures  None    LABS  Recent Results (from the past 24 hour(s))   Ethanol    Collection Time: 11/25/21  8:28 AM    Specimen: Blood   Result Value Ref Range    Ethanol <10 0 - 10 mg/dL    Ethanol % <0.010 %   Comprehensive Metabolic Panel    Collection Time: 11/25/21  8:28 AM    Specimen: Blood   Result Value Ref Range    Glucose 108 (H) 65 - 99 mg/dL    BUN 13 6 - 20 mg/dL    Creatinine 0.72 (L) 0.76 - 1.27 mg/dL    Sodium 136 136 - 145 mmol/L    Potassium 4.0 3.5 - 5.2 mmol/L    Chloride 100 98 - 107 mmol/L    CO2 25.4 22.0 - 29.0 mmol/L    Calcium 9.3 8.6 - 10.5 mg/dL    Total Protein 7.5 6.0 - 8.5 g/dL    Albumin 4.50 3.50 - 5.20 g/dL    ALT (SGPT) 16 1 - 41 U/L    AST (SGOT) 22 1 - 40 U/L    Alkaline Phosphatase 96 39 - 117 U/L    Total Bilirubin 0.8 0.0 - 1.2 mg/dL    eGFR Non African Amer 114 >60 mL/min/1.73    Globulin 3.0 gm/dL    A/G Ratio 1.5 g/dL    BUN/Creatinine Ratio 18.1 7.0 - 25.0    Anion Gap 10.6 5.0 - 15.0 mmol/L   Lipase    Collection Time: 11/25/21  8:28 AM    Specimen: Blood   Result Value  Ref Range    Lipase 31 13 - 60 U/L   CBC Auto Differential    Collection Time: 11/25/21  8:28 AM    Specimen: Blood   Result Value Ref Range    WBC 14.03 (H) 3.40 - 10.80 10*3/mm3    RBC 4.68 4.14 - 5.80 10*6/mm3    Hemoglobin 13.5 13.0 - 17.7 g/dL    Hematocrit 41.6 37.5 - 51.0 %    MCV 88.9 79.0 - 97.0 fL    MCH 28.8 26.6 - 33.0 pg    MCHC 32.5 31.5 - 35.7 g/dL    RDW 14.0 12.3 - 15.4 %    RDW-SD 45.0 37.0 - 54.0 fl    MPV 9.4 6.0 - 12.0 fL    Platelets 356 140 - 450 10*3/mm3    Neutrophil % 78.2 (H) 42.7 - 76.0 %    Lymphocyte % 9.9 (L) 19.6 - 45.3 %    Monocyte % 9.6 5.0 - 12.0 %    Eosinophil % 1.8 0.3 - 6.2 %    Basophil % 0.3 0.0 - 1.5 %    Immature Grans % 0.2 0.0 - 0.5 %    Neutrophils, Absolute 10.98 (H) 1.70 - 7.00 10*3/mm3    Lymphocytes, Absolute 1.39 0.70 - 3.10 10*3/mm3    Monocytes, Absolute 1.34 (H) 0.10 - 0.90 10*3/mm3    Eosinophils, Absolute 0.25 0.00 - 0.40 10*3/mm3    Basophils, Absolute 0.04 0.00 - 0.20 10*3/mm3    Immature Grans, Absolute 0.03 0.00 - 0.05 10*3/mm3    nRBC 0.0 0.0 - 0.2 /100 WBC   Urinalysis With Microscopic If Indicated (No Culture) - Urine, Clean Catch    Collection Time: 11/25/21 10:59 AM    Specimen: Urine, Clean Catch   Result Value Ref Range    Color, UA Yellow Yellow, Straw    Appearance, UA Clear Clear    pH, UA 6.0 5.0 - 8.0    Specific Gravity, UA >=1.030 1.005 - 1.030    Glucose, UA Negative Negative    Ketones, UA 80 mg/dL (3+) (A) Negative    Bilirubin, UA Negative Negative    Blood, UA Negative Negative    Protein, UA Negative Negative    Leuk Esterase, UA Negative Negative    Nitrite, UA Negative Negative    Urobilinogen, UA 1.0 E.U./dL 0.2 - 1.0 E.U./dL       RADIOLOGY  CT Abdomen Pelvis With Contrast   Final Result          MEDICATIONS GIVEN IN THE ER  Medications   sodium chloride 0.9 % bolus 1,000 mL (0 mL Intravenous Stopped 11/25/21 1038)   folic acid 1 mg in sodium chloride 0.9 % 50 mL IVPB (0 mg Intravenous Stopped 11/25/21 1101)   thiamine (B-1) 100 mg  in sodium chloride 0.9 % 100 mL IVPB (100 mg Intravenous New Bag 11/25/21 1102)   iopamidol (ISOVUE-300) 61 % injection 100 mL (85 mL Intravenous Given by Other 11/25/21 0924)   famotidine (PEPCID) injection 20 mg (20 mg Intravenous Given 11/25/21 1053)   HYDROmorphone (DILAUDID) injection 0.5 mg (0.5 mg Intravenous Given 11/25/21 1100)       MEDICAL DECISION MAKING, CONSULTS AND PROGRESS NOTES  All labs and all radiology studies were have been viewed and interpreted by me.   Discussion below represents my analysis of pertinent findings related to patient's condition, differential diagnosis, treatment plan and final disposition.    Differential diagnosis includes but is not limited to:  - Hepatobiliary pathology such as cholecystitis, cholangitis, and symptomatic cholelithiasis  - Pancreatitis  - Dyspepsia  - Small bowel obstruction  - Appendicitis  - Diverticulitis  - UTI including pyelonephritis  - Ureteral stone  - Zoster  - Colitis, including infectious and ischemic  - Atypical ACS    PPE: The patient was placed in a face mask in first look. Patient was wearing facemask when I entered the room and throughout our encounter. I wore full protective equipment throughout this patient encounter including a face mask, eye protection and gloves. Hand hygiene was performed before donning protective equipment and after removal when leaving the room.    AS OF 11:34 EST VITALS:    BP - 112/62  HR - 93  TEMP - 99.6 °F (37.6 °C)  O2 SATS - 95%    ED Course as of 11/25/21 1134   Thu Nov 25, 2021   0800 The patient is currently sleeping in bed in no distress. [AR]      ED Course User Index  [AR] Janessa Daily PA     The patient's history, physical exam, and lab findings were discussed with the physician, who also performed a face to face history and physical exam.  I discussed all results and noted any abnormalities with patient.  Discussed absoute need to recheck abnormalities with their family physician.  I answered  any of the patient's questions.  Discussed plan for discharge, as there is no emergent indication for admission.  Pt is agreeable and understands need for follow up and repeat testing.  Pt is aware that discharge does not mean that nothing is wrong but it indicates no emergency is present and they must continue care with their family physician.  Pt is discharged with instructions to follow up with primary care doctor to have their blood pressure rechecked.     DIAGNOSIS   Diagnosis Plan   1. Duodenitis         DISPOSITION  ED Disposition     ED Disposition Condition Comment    Discharge Stable           FOLLOW UP  PATIENT CONNECTION - [place] [address]  [phone]  Schedule an appointment as soon as possible for a visit       Allyson Magaña MD  [address]  [place] [address]  [phone]    Schedule an appointment as soon as possible for a visit         RX  Medications   sodium chloride 0.9 % bolus 1,000 mL (0 mL Intravenous Stopped 11/25/21 1038)   folic acid 1 mg in sodium chloride 0.9 % 50 mL IVPB (0 mg Intravenous Stopped 11/25/21 1101)   thiamine (B-1) 100 mg in sodium chloride 0.9 % 100 mL IVPB (100 mg Intravenous New Bag 11/25/21 1102)   iopamidol (ISOVUE-300) 61 % injection 100 mL (85 mL Intravenous Given by Other 11/25/21 0924)   famotidine (PEPCID) injection 20 mg (20 mg Intravenous Given 11/25/21 1053)   HYDROmorphone (DILAUDID) injection 0.5 mg (0.5 mg Intravenous Given 11/25/21 1100)          Where to Get Your Medications      You can get these medications from any pharmacy    Bring a paper prescription for each of these medications  · pantoprazole 40 MG EC tablet  · sucralfate 1 g tablet        Medication List      No changes were made to your prescriptions during this visit.       Provider Attestation:  I personally reviewed the past medical history, past surgical history, social history, family history, current medications and allergies as they appear in the  chart. I reviewed the patient's history, physical, lab/imaging results and overall care with Dr. Ray who is in agreement with the patient's treatment plan.    EMR Dragon/Transcription disclaimer:  Some of this encounter note is an electronic transcription/translation of spoken language to printed text. The electronic translation of spoken language may permit erroneous, or at times, nonsensical words or phrases to be inadvertently transcribed; Although I have reviewed the note for such errors, some may still exist.    Provider note signed by:         Janessa Daily PA  11/25/21 1134

## 2021-11-25 NOTE — ED PROVIDER NOTES
08:45 EST  Patient seen and examined with physician assistant.  The patient presents for evaluation of upper abdominal pain.  Patient has had this in the past and diagnosed with gastritis in the past.  Patient states this pain is similar.  Has been constipated for a week.  Patient has had no vomiting or diarrhea.  Patient states he continues to drink.  Patient was taken off [employer] bus for this today.          On exam patient is alert cooperative in no distress.  Patient has mild tenderness to the epigastric area.  Patient's heart is regular rate and rhythm lungs are clear bilaterally.          Plan will be lab evaluation CT scan.        MD ATTESTATION NOTE    The KODY and I have discussed this patient's history, physical exam, and treatment plan.  I have reviewed the documentation and personally had a face to face interaction with the patient. I affirm the documentation and agree with the treatment and plan.  The attached note describes my personal findings.      Patient was wearing a face mask when I entered the room and they continued to wear a mask throughout their stay in the ED.  I wore PPE, including  gloves, face mask with shield or face mask with goggles whenever I was in the room with patient.      Raul Ray MD  11/25/21 5140

## 2021-11-25 NOTE — DISCHARGE INSTRUCTIONS
Although you are being discharged from the ED today, I encourage you to return for worsening symptoms. Things can, and do, change such that treatment at home with medication may not be adequate. Specifically I recommend returning for chest pain or discomfort, difficulty breathing, persistent vomiting or difficulty holding down liquids or medications, fever > 102.0 F or any other worsening or alarming symptoms.     Rest. Drink plenty of fluids.  Follow up with Dr. Magaña (gastroenterologist) for further evaluation and management.  Follow up with primary care provider for further management and to have blood pressure rechecked.  Take your medications as prescribed.

## 2021-11-29 ENCOUNTER — READMISSION MANAGEMENT (OUTPATIENT)
Dept: CALL CENTER | Facility: HOSPITAL | Age: 56
End: 2021-11-29

## 2021-11-29 NOTE — OUTREACH NOTE
Medical Week 3 Survey      Responses   [facility] patient discharged from? [place]   Does the patient have one of the following disease processes/diagnoses(primary or secondary)? Other   Week 3 attempt successful? No   Call end time 1835   Week 3 Call Completed? Yes   Is the patient interested in additional calls from an ambulatory ?  NOTE:  applies to high risk patients requiring additional follow-up. No   Revoked No further contact(revokes)-requires comment   Graduated/Revoked comments UTR x 4          Hellen Rojo RN

## 2022-04-20 ENCOUNTER — INPATIENT HOSPITAL (OUTPATIENT)
Dept: URBAN - METROPOLITAN AREA HOSPITAL 107 | Facility: HOSPITAL | Age: 57
End: 2022-04-20
Payer: MEDICAID

## 2022-04-20 DIAGNOSIS — B19.20 UNSPECIFIED VIRAL HEPATITIS C WITHOUT HEPATIC COMA: ICD-10-CM

## 2022-04-20 DIAGNOSIS — K20.90 ESOPHAGITIS, UNSPECIFIED WITHOUT BLEEDING: ICD-10-CM

## 2022-04-20 DIAGNOSIS — R93.3 ABNORMAL FINDINGS ON DIAGNOSTIC IMAGING OF OTHER PARTS OF DI: ICD-10-CM

## 2022-04-20 PROCEDURE — 99222 1ST HOSP IP/OBS MODERATE 55: CPT | Performed by: NURSE PRACTITIONER

## 2022-04-21 ENCOUNTER — INPATIENT HOSPITAL (OUTPATIENT)
Dept: URBAN - METROPOLITAN AREA HOSPITAL 107 | Facility: HOSPITAL | Age: 57
End: 2022-04-21
Payer: MEDICAID

## 2022-04-21 DIAGNOSIS — B19.20 UNSPECIFIED VIRAL HEPATITIS C WITHOUT HEPATIC COMA: ICD-10-CM

## 2022-04-21 DIAGNOSIS — F10.10 ALCOHOL ABUSE, UNCOMPLICATED: ICD-10-CM

## 2022-04-21 DIAGNOSIS — K20.90 ESOPHAGITIS, UNSPECIFIED WITHOUT BLEEDING: ICD-10-CM

## 2022-04-21 DIAGNOSIS — R93.3 ABNORMAL FINDINGS ON DIAGNOSTIC IMAGING OF OTHER PARTS OF DI: ICD-10-CM

## 2022-04-21 PROCEDURE — 99232 SBSQ HOSP IP/OBS MODERATE 35: CPT | Performed by: PHYSICIAN ASSISTANT

## 2022-04-23 ENCOUNTER — INPATIENT HOSPITAL (OUTPATIENT)
Dept: URBAN - METROPOLITAN AREA HOSPITAL 107 | Facility: HOSPITAL | Age: 57
End: 2022-04-23
Payer: MEDICAID

## 2022-04-23 DIAGNOSIS — K21.00 GASTRO-ESOPHAGEAL REFLUX DISEASE WITH ESOPHAGITIS, WITHOUT B: ICD-10-CM

## 2022-04-23 DIAGNOSIS — R93.3 ABNORMAL FINDINGS ON DIAGNOSTIC IMAGING OF OTHER PARTS OF DI: ICD-10-CM

## 2022-04-23 PROCEDURE — 43235 EGD DIAGNOSTIC BRUSH WASH: CPT | Performed by: INTERNAL MEDICINE

## 2022-10-18 ENCOUNTER — HOSPITAL ENCOUNTER (OUTPATIENT)
Facility: HOSPITAL | Age: 57
Setting detail: OBSERVATION
Discharge: HOME | End: 2022-10-27
Attending: EMERGENCY MEDICINE | Admitting: INTERNAL MEDICINE

## 2022-10-18 ENCOUNTER — APPOINTMENT (OUTPATIENT)
Dept: CARDIOLOGY | Facility: HOSPITAL | Age: 57
End: 2022-10-18

## 2022-10-18 DIAGNOSIS — L03.116 CELLULITIS OF LEFT LOWER EXTREMITY WITHOUT FOOT: Primary | ICD-10-CM

## 2022-10-18 DIAGNOSIS — F31.9 BIPOLAR AFFECTIVE DISORDER, REMISSION STATUS UNSPECIFIED: ICD-10-CM

## 2022-10-18 LAB
ALBUMIN SERPL-MCNC: 4 G/DL (ref 3.5–5.2)
ALBUMIN/GLOB SERPL: 1.3 G/DL
ALP SERPL-CCNC: 86 U/L (ref 39–117)
ALT SERPL W P-5'-P-CCNC: 40 U/L (ref 1–41)
ANION GAP SERPL CALCULATED.3IONS-SCNC: 11 MMOL/L (ref 5–15)
AST SERPL-CCNC: 35 U/L (ref 1–40)
BASOPHILS # BLD AUTO: 0.06 10*3/MM3 (ref 0–0.2)
BASOPHILS NFR BLD AUTO: 0.5 % (ref 0–1.5)
BH CV LOWER VASCULAR LEFT COMMON FEMORAL AUGMENT: NORMAL
BH CV LOWER VASCULAR LEFT COMMON FEMORAL COMPETENT: NORMAL
BH CV LOWER VASCULAR LEFT COMMON FEMORAL PHASIC: NORMAL
BH CV LOWER VASCULAR LEFT COMMON FEMORAL SPONT: NORMAL
BH CV LOWER VASCULAR LEFT DISTAL FEMORAL COMPRESS: NORMAL
BH CV LOWER VASCULAR LEFT GASTRONEMIUS COMPRESS: NORMAL
BH CV LOWER VASCULAR LEFT GREATER SAPH AK COMPRESS: NORMAL
BH CV LOWER VASCULAR LEFT GREATER SAPH BK COMPRESS: NORMAL
BH CV LOWER VASCULAR LEFT LESSER SAPH COMPRESS: NORMAL
BH CV LOWER VASCULAR LEFT MID FEMORAL AUGMENT: NORMAL
BH CV LOWER VASCULAR LEFT MID FEMORAL COMPETENT: NORMAL
BH CV LOWER VASCULAR LEFT MID FEMORAL COMPRESS: NORMAL
BH CV LOWER VASCULAR LEFT MID FEMORAL PHASIC: NORMAL
BH CV LOWER VASCULAR LEFT MID FEMORAL SPONT: NORMAL
BH CV LOWER VASCULAR LEFT PERONEAL COMPRESS: NORMAL
BH CV LOWER VASCULAR LEFT POPLITEAL AUGMENT: NORMAL
BH CV LOWER VASCULAR LEFT POPLITEAL COMPETENT: NORMAL
BH CV LOWER VASCULAR LEFT POPLITEAL COMPRESS: NORMAL
BH CV LOWER VASCULAR LEFT POPLITEAL PHASIC: NORMAL
BH CV LOWER VASCULAR LEFT POPLITEAL SPONT: NORMAL
BH CV LOWER VASCULAR LEFT POSTERIOR TIBIAL COMPRESS: NORMAL
BH CV LOWER VASCULAR LEFT PROFUNDA FEMORAL COMPRESS: NORMAL
BH CV LOWER VASCULAR LEFT PROXIMAL FEMORAL COMPRESS: NORMAL
BH CV LOWER VASCULAR LEFT SAPHENOFEMORAL JUNCTION COMPRESS: NORMAL
BH CV LOWER VASCULAR RIGHT COMMON FEMORAL AUGMENT: NORMAL
BH CV LOWER VASCULAR RIGHT COMMON FEMORAL COMPETENT: NORMAL
BH CV LOWER VASCULAR RIGHT COMMON FEMORAL COMPRESS: NORMAL
BH CV LOWER VASCULAR RIGHT COMMON FEMORAL PHASIC: NORMAL
BH CV LOWER VASCULAR RIGHT COMMON FEMORAL SPONT: NORMAL
BILIRUB SERPL-MCNC: 0.5 MG/DL (ref 0–1.2)
BUN SERPL-MCNC: 10 MG/DL (ref 6–20)
BUN/CREAT SERPL: 11.9 (ref 7–25)
CALCIUM SPEC-SCNC: 8.7 MG/DL (ref 8.6–10.5)
CHLORIDE SERPL-SCNC: 103 MMOL/L (ref 98–107)
CO2 SERPL-SCNC: 23 MMOL/L (ref 22–29)
CREAT SERPL-MCNC: 0.84 MG/DL (ref 0.76–1.27)
D-LACTATE SERPL-SCNC: 1.5 MMOL/L (ref 0.5–2)
DEPRECATED RDW RBC AUTO: 58 FL (ref 37–54)
EGFRCR SERPLBLD CKD-EPI 2021: 103 ML/MIN/1.73
EOSINOPHIL # BLD AUTO: 0.06 10*3/MM3 (ref 0–0.4)
EOSINOPHIL NFR BLD AUTO: 0.5 % (ref 0.3–6.2)
ERYTHROCYTE [DISTWIDTH] IN BLOOD BY AUTOMATED COUNT: 17.2 % (ref 12.3–15.4)
ETHANOL BLD-MCNC: <10 MG/DL (ref 0–10)
ETHANOL UR QL: <0.01 %
GLOBULIN UR ELPH-MCNC: 3.1 GM/DL
GLUCOSE SERPL-MCNC: 102 MG/DL (ref 65–99)
HCT VFR BLD AUTO: 34 % (ref 37.5–51)
HGB BLD-MCNC: 10.9 G/DL (ref 13–17.7)
LYMPHOCYTES # BLD AUTO: 1.39 10*3/MM3 (ref 0.7–3.1)
LYMPHOCYTES NFR BLD AUTO: 10.6 % (ref 19.6–45.3)
MAGNESIUM SERPL-MCNC: 2.4 MG/DL (ref 1.6–2.6)
MAXIMAL PREDICTED HEART RATE: 165 BPM
MCH RBC QN AUTO: 29.2 PG (ref 26.6–33)
MCHC RBC AUTO-ENTMCNC: 32.1 G/DL (ref 31.5–35.7)
MCV RBC AUTO: 91.2 FL (ref 79–97)
MONOCYTES # BLD AUTO: 1.33 10*3/MM3 (ref 0.1–0.9)
MONOCYTES NFR BLD AUTO: 10.1 % (ref 5–12)
NEUTROPHILS NFR BLD AUTO: 10.24 10*3/MM3 (ref 1.7–7)
NEUTROPHILS NFR BLD AUTO: 77.9 % (ref 42.7–76)
PLATELET # BLD AUTO: 821 10*3/MM3 (ref 140–450)
PMV BLD AUTO: 9.9 FL (ref 6–12)
POTASSIUM SERPL-SCNC: 4.1 MMOL/L (ref 3.5–5.2)
PROCALCITONIN SERPL-MCNC: 0.07 NG/ML (ref 0–0.25)
PROT SERPL-MCNC: 7.1 G/DL (ref 6–8.5)
RBC # BLD AUTO: 3.73 10*6/MM3 (ref 4.14–5.8)
SARS-COV-2 RNA PNL SPEC NAA+PROBE: NOT DETECTED
SODIUM SERPL-SCNC: 137 MMOL/L (ref 136–145)
STRESS TARGET HR: 140 BPM
WBC NRBC COR # BLD: 13.13 10*3/MM3 (ref 3.4–10.8)

## 2022-10-18 PROCEDURE — 25010000002 KETOROLAC TROMETHAMINE PER 15 MG: Performed by: NURSE PRACTITIONER

## 2022-10-18 PROCEDURE — 84145 PROCALCITONIN (PCT): CPT | Performed by: NURSE PRACTITIONER

## 2022-10-18 PROCEDURE — 93971 EXTREMITY STUDY: CPT

## 2022-10-18 PROCEDURE — G0378 HOSPITAL OBSERVATION PER HR: HCPCS

## 2022-10-18 PROCEDURE — 82077 ASSAY SPEC XCP UR&BREATH IA: CPT | Performed by: NURSE PRACTITIONER

## 2022-10-18 PROCEDURE — 25010000002 PIPERACILLIN SOD-TAZOBACTAM PER 1 G: Performed by: NURSE PRACTITIONER

## 2022-10-18 PROCEDURE — 87040 BLOOD CULTURE FOR BACTERIA: CPT | Performed by: NURSE PRACTITIONER

## 2022-10-18 PROCEDURE — 85025 COMPLETE CBC W/AUTO DIFF WBC: CPT | Performed by: NURSE PRACTITIONER

## 2022-10-18 PROCEDURE — 80053 COMPREHEN METABOLIC PANEL: CPT | Performed by: NURSE PRACTITIONER

## 2022-10-18 PROCEDURE — 87635 SARS-COV-2 COVID-19 AMP PRB: CPT | Performed by: NURSE PRACTITIONER

## 2022-10-18 PROCEDURE — 83605 ASSAY OF LACTIC ACID: CPT | Performed by: NURSE PRACTITIONER

## 2022-10-18 PROCEDURE — 99285 EMERGENCY DEPT VISIT HI MDM: CPT

## 2022-10-18 PROCEDURE — 96375 TX/PRO/DX INJ NEW DRUG ADDON: CPT

## 2022-10-18 PROCEDURE — 96365 THER/PROPH/DIAG IV INF INIT: CPT

## 2022-10-18 PROCEDURE — 83735 ASSAY OF MAGNESIUM: CPT | Performed by: NURSE PRACTITIONER

## 2022-10-18 RX ORDER — AMLODIPINE BESYLATE 5 MG/1
5 TABLET ORAL
Status: DISCONTINUED | OUTPATIENT
Start: 2022-10-19 | End: 2022-10-19

## 2022-10-18 RX ORDER — KETOROLAC TROMETHAMINE 15 MG/ML
15 INJECTION, SOLUTION INTRAMUSCULAR; INTRAVENOUS ONCE
Status: COMPLETED | OUTPATIENT
Start: 2022-10-18 | End: 2022-10-18

## 2022-10-18 RX ORDER — SODIUM CHLORIDE 0.9 % (FLUSH) 0.9 %
10 SYRINGE (ML) INJECTION AS NEEDED
Status: DISCONTINUED | OUTPATIENT
Start: 2022-10-18 | End: 2022-10-27 | Stop reason: HOSPADM

## 2022-10-18 RX ORDER — QUETIAPINE FUMARATE 200 MG/1
200 TABLET, FILM COATED ORAL NIGHTLY
Status: DISCONTINUED | OUTPATIENT
Start: 2022-10-19 | End: 2022-10-27 | Stop reason: HOSPADM

## 2022-10-18 RX ORDER — PANTOPRAZOLE SODIUM 40 MG/1
40 TABLET, DELAYED RELEASE ORAL
Status: DISCONTINUED | OUTPATIENT
Start: 2022-10-19 | End: 2022-10-19

## 2022-10-18 RX ADMIN — TAZOBACTAM SODIUM AND PIPERACILLIN SODIUM 3.38 G: 375; 3 INJECTION, SOLUTION INTRAVENOUS at 16:10

## 2022-10-18 RX ADMIN — KETOROLAC TROMETHAMINE 15 MG: 15 INJECTION, SOLUTION INTRAMUSCULAR; INTRAVENOUS at 17:17

## 2022-10-19 LAB
AMPHET+METHAMPHET UR QL: POSITIVE
ANION GAP SERPL CALCULATED.3IONS-SCNC: 9.7 MMOL/L (ref 5–15)
BARBITURATES UR QL SCN: NEGATIVE
BASOPHILS # BLD AUTO: 0.05 10*3/MM3 (ref 0–0.2)
BASOPHILS NFR BLD AUTO: 0.6 % (ref 0–1.5)
BENZODIAZ UR QL SCN: NEGATIVE
BUN SERPL-MCNC: 9 MG/DL (ref 6–20)
BUN/CREAT SERPL: 12 (ref 7–25)
CALCIUM SPEC-SCNC: 8.4 MG/DL (ref 8.6–10.5)
CANNABINOIDS SERPL QL: NEGATIVE
CHLORIDE SERPL-SCNC: 105 MMOL/L (ref 98–107)
CO2 SERPL-SCNC: 25.3 MMOL/L (ref 22–29)
COCAINE UR QL: NEGATIVE
CREAT SERPL-MCNC: 0.75 MG/DL (ref 0.76–1.27)
DEPRECATED RDW RBC AUTO: 53.9 FL (ref 37–54)
EGFRCR SERPLBLD CKD-EPI 2021: 106.6 ML/MIN/1.73
EOSINOPHIL # BLD AUTO: 0.13 10*3/MM3 (ref 0–0.4)
EOSINOPHIL NFR BLD AUTO: 1.6 % (ref 0.3–6.2)
ERYTHROCYTE [DISTWIDTH] IN BLOOD BY AUTOMATED COUNT: 16.7 % (ref 12.3–15.4)
GLUCOSE SERPL-MCNC: 129 MG/DL (ref 65–99)
HCT VFR BLD AUTO: 30 % (ref 37.5–51)
HGB BLD-MCNC: 10.3 G/DL (ref 13–17.7)
IMM GRANULOCYTES # BLD AUTO: 0.03 10*3/MM3 (ref 0–0.05)
IMM GRANULOCYTES NFR BLD AUTO: 0.4 % (ref 0–0.5)
LYMPHOCYTES # BLD AUTO: 1.45 10*3/MM3 (ref 0.7–3.1)
LYMPHOCYTES NFR BLD AUTO: 17.6 % (ref 19.6–45.3)
MCH RBC QN AUTO: 29.9 PG (ref 26.6–33)
MCHC RBC AUTO-ENTMCNC: 34.3 G/DL (ref 31.5–35.7)
MCV RBC AUTO: 87 FL (ref 79–97)
METHADONE UR QL SCN: NEGATIVE
MONOCYTES # BLD AUTO: 1.09 10*3/MM3 (ref 0.1–0.9)
MONOCYTES NFR BLD AUTO: 13.2 % (ref 5–12)
NEUTROPHILS NFR BLD AUTO: 5.51 10*3/MM3 (ref 1.7–7)
NEUTROPHILS NFR BLD AUTO: 66.6 % (ref 42.7–76)
NRBC BLD AUTO-RTO: 0 /100 WBC (ref 0–0.2)
NT-PROBNP SERPL-MCNC: 83.9 PG/ML (ref 0–900)
OPIATES UR QL: NEGATIVE
OXYCODONE UR QL SCN: NEGATIVE
PLATELET # BLD AUTO: 860 10*3/MM3 (ref 140–450)
PMV BLD AUTO: 9.7 FL (ref 6–12)
POTASSIUM SERPL-SCNC: 3.4 MMOL/L (ref 3.5–5.2)
RBC # BLD AUTO: 3.45 10*6/MM3 (ref 4.14–5.8)
SODIUM SERPL-SCNC: 140 MMOL/L (ref 136–145)
WBC NRBC COR # BLD: 8.26 10*3/MM3 (ref 3.4–10.8)

## 2022-10-19 PROCEDURE — 80307 DRUG TEST PRSMV CHEM ANLYZR: CPT | Performed by: NURSE PRACTITIONER

## 2022-10-19 PROCEDURE — 25010000002 PIPERACILLIN SOD-TAZOBACTAM PER 1 G: Performed by: HOSPITALIST

## 2022-10-19 PROCEDURE — 90791 PSYCH DIAGNOSTIC EVALUATION: CPT

## 2022-10-19 PROCEDURE — 80048 BASIC METABOLIC PNL TOTAL CA: CPT | Performed by: HOSPITALIST

## 2022-10-19 PROCEDURE — 85025 COMPLETE CBC W/AUTO DIFF WBC: CPT | Performed by: HOSPITALIST

## 2022-10-19 PROCEDURE — 25010000002 SODIUM CHLORIDE 0.9 % WITH KCL 20 MEQ 20-0.9 MEQ/L-% SOLUTION: Performed by: HOSPITALIST

## 2022-10-19 PROCEDURE — 96366 THER/PROPH/DIAG IV INF ADDON: CPT

## 2022-10-19 PROCEDURE — 83880 ASSAY OF NATRIURETIC PEPTIDE: CPT | Performed by: HOSPITALIST

## 2022-10-19 PROCEDURE — G0378 HOSPITAL OBSERVATION PER HR: HCPCS

## 2022-10-19 RX ORDER — KETOROLAC TROMETHAMINE 15 MG/ML
15 INJECTION, SOLUTION INTRAMUSCULAR; INTRAVENOUS ONCE
Status: DISCONTINUED | OUTPATIENT
Start: 2022-10-19 | End: 2022-10-19

## 2022-10-19 RX ORDER — FOLIC ACID 1 MG/1
1 TABLET ORAL DAILY
Status: DISCONTINUED | OUTPATIENT
Start: 2022-10-19 | End: 2022-10-27 | Stop reason: HOSPADM

## 2022-10-19 RX ORDER — MULTIPLE VITAMINS W/ MINERALS TAB 9MG-400MCG
1 TAB ORAL DAILY
Status: DISCONTINUED | OUTPATIENT
Start: 2022-10-19 | End: 2022-10-27 | Stop reason: HOSPADM

## 2022-10-19 RX ORDER — SUCRALFATE 1 G/1
1 TABLET ORAL
Status: DISCONTINUED | OUTPATIENT
Start: 2022-10-19 | End: 2022-10-27 | Stop reason: HOSPADM

## 2022-10-19 RX ORDER — DIPHENOXYLATE HYDROCHLORIDE AND ATROPINE SULFATE 2.5; .025 MG/1; MG/1
1 TABLET ORAL DAILY
Status: DISCONTINUED | OUTPATIENT
Start: 2022-10-19 | End: 2022-10-19

## 2022-10-19 RX ORDER — SODIUM CHLORIDE AND POTASSIUM CHLORIDE 150; 900 MG/100ML; MG/100ML
75 INJECTION, SOLUTION INTRAVENOUS CONTINUOUS
Status: DISCONTINUED | OUTPATIENT
Start: 2022-10-19 | End: 2022-10-21

## 2022-10-19 RX ORDER — PANTOPRAZOLE SODIUM 40 MG/1
40 TABLET, DELAYED RELEASE ORAL
Status: DISCONTINUED | OUTPATIENT
Start: 2022-10-19 | End: 2022-10-27 | Stop reason: HOSPADM

## 2022-10-19 RX ADMIN — APIXABAN 5 MG: 5 TABLET, FILM COATED ORAL at 21:29

## 2022-10-19 RX ADMIN — APIXABAN 5 MG: 5 TABLET, FILM COATED ORAL at 10:21

## 2022-10-19 RX ADMIN — FOLIC ACID 1 MG: 1 TABLET ORAL at 10:21

## 2022-10-19 RX ADMIN — Medication 100 MG: at 10:21

## 2022-10-19 RX ADMIN — TAZOBACTAM SODIUM AND PIPERACILLIN SODIUM 3.38 G: 375; 3 INJECTION, SOLUTION INTRAVENOUS at 15:44

## 2022-10-19 RX ADMIN — SUCRALFATE 1 G: 1 TABLET ORAL at 13:07

## 2022-10-19 RX ADMIN — TAZOBACTAM SODIUM AND PIPERACILLIN SODIUM 3.38 G: 375; 3 INJECTION, SOLUTION INTRAVENOUS at 00:27

## 2022-10-19 RX ADMIN — QUETIAPINE FUMARATE 200 MG: 200 TABLET ORAL at 00:27

## 2022-10-19 RX ADMIN — PANTOPRAZOLE SODIUM 40 MG: 40 TABLET, DELAYED RELEASE ORAL at 17:51

## 2022-10-19 RX ADMIN — POTASSIUM CHLORIDE AND SODIUM CHLORIDE 75 ML/HR: 900; 150 INJECTION, SOLUTION INTRAVENOUS at 13:07

## 2022-10-19 RX ADMIN — MULTIPLE VITAMINS W/ MINERALS TAB 1 TABLET: TAB at 10:21

## 2022-10-19 RX ADMIN — SUCRALFATE 1 G: 1 TABLET ORAL at 17:51

## 2022-10-19 RX ADMIN — TAZOBACTAM SODIUM AND PIPERACILLIN SODIUM 3.38 G: 375; 3 INJECTION, SOLUTION INTRAVENOUS at 06:44

## 2022-10-19 RX ADMIN — QUETIAPINE FUMARATE 200 MG: 200 TABLET ORAL at 21:34

## 2022-10-20 LAB
ALBUMIN SERPL-MCNC: 3.1 G/DL (ref 3.5–5.2)
ALBUMIN/GLOB SERPL: 1.3 G/DL
ALP SERPL-CCNC: 57 U/L (ref 39–117)
ALT SERPL W P-5'-P-CCNC: 32 U/L (ref 1–41)
ANION GAP SERPL CALCULATED.3IONS-SCNC: 6.8 MMOL/L (ref 5–15)
AST SERPL-CCNC: 30 U/L (ref 1–40)
BASOPHILS # BLD AUTO: 0.1 10*3/MM3 (ref 0–0.2)
BASOPHILS NFR BLD AUTO: 1.4 % (ref 0–1.5)
BILIRUB SERPL-MCNC: <0.2 MG/DL (ref 0–1.2)
BUN SERPL-MCNC: 8 MG/DL (ref 6–20)
BUN/CREAT SERPL: 14.5 (ref 7–25)
CALCIUM SPEC-SCNC: 7.6 MG/DL (ref 8.6–10.5)
CHLORIDE SERPL-SCNC: 111 MMOL/L (ref 98–107)
CHOLEST SERPL-MCNC: 106 MG/DL (ref 0–200)
CO2 SERPL-SCNC: 22.2 MMOL/L (ref 22–29)
CREAT SERPL-MCNC: 0.55 MG/DL (ref 0.76–1.27)
DEPRECATED RDW RBC AUTO: 50.8 FL (ref 37–54)
EGFRCR SERPLBLD CKD-EPI 2021: 117 ML/MIN/1.73
EOSINOPHIL # BLD AUTO: 0.25 10*3/MM3 (ref 0–0.4)
EOSINOPHIL NFR BLD AUTO: 3.5 % (ref 0.3–6.2)
ERYTHROCYTE [DISTWIDTH] IN BLOOD BY AUTOMATED COUNT: 16.4 % (ref 12.3–15.4)
GLOBULIN UR ELPH-MCNC: 2.4 GM/DL
GLUCOSE SERPL-MCNC: 113 MG/DL (ref 65–99)
HBA1C MFR BLD: 4.9 % (ref 4.8–5.6)
HCT VFR BLD AUTO: 30.2 % (ref 37.5–51)
HDLC SERPL-MCNC: 43 MG/DL (ref 40–60)
HGB BLD-MCNC: 10.3 G/DL (ref 13–17.7)
IMM GRANULOCYTES # BLD AUTO: 0.03 10*3/MM3 (ref 0–0.05)
IMM GRANULOCYTES NFR BLD AUTO: 0.4 % (ref 0–0.5)
LDLC SERPL CALC-MCNC: 51 MG/DL (ref 0–100)
LDLC/HDLC SERPL: 1.23 {RATIO}
LYMPHOCYTES # BLD AUTO: 2.03 10*3/MM3 (ref 0.7–3.1)
LYMPHOCYTES NFR BLD AUTO: 28.4 % (ref 19.6–45.3)
MCH RBC QN AUTO: 29.5 PG (ref 26.6–33)
MCHC RBC AUTO-ENTMCNC: 34.1 G/DL (ref 31.5–35.7)
MCV RBC AUTO: 86.5 FL (ref 79–97)
MONOCYTES # BLD AUTO: 1.04 10*3/MM3 (ref 0.1–0.9)
MONOCYTES NFR BLD AUTO: 14.5 % (ref 5–12)
NEUTROPHILS NFR BLD AUTO: 3.71 10*3/MM3 (ref 1.7–7)
NEUTROPHILS NFR BLD AUTO: 51.8 % (ref 42.7–76)
NRBC BLD AUTO-RTO: 0 /100 WBC (ref 0–0.2)
PLATELET # BLD AUTO: 888 10*3/MM3 (ref 140–450)
PMV BLD AUTO: 10 FL (ref 6–12)
POTASSIUM SERPL-SCNC: 3.6 MMOL/L (ref 3.5–5.2)
PROT SERPL-MCNC: 5.5 G/DL (ref 6–8.5)
RBC # BLD AUTO: 3.49 10*6/MM3 (ref 4.14–5.8)
SODIUM SERPL-SCNC: 140 MMOL/L (ref 136–145)
TRIGL SERPL-MCNC: 50 MG/DL (ref 0–150)
TSH SERPL DL<=0.05 MIU/L-ACNC: 2.61 UIU/ML (ref 0.27–4.2)
VLDLC SERPL-MCNC: 12 MG/DL (ref 5–40)
WBC NRBC COR # BLD: 7.16 10*3/MM3 (ref 3.4–10.8)

## 2022-10-20 PROCEDURE — 80061 LIPID PANEL: CPT | Performed by: HOSPITALIST

## 2022-10-20 PROCEDURE — 80050 GENERAL HEALTH PANEL: CPT | Performed by: HOSPITALIST

## 2022-10-20 PROCEDURE — 83036 HEMOGLOBIN GLYCOSYLATED A1C: CPT | Performed by: HOSPITALIST

## 2022-10-20 PROCEDURE — G0378 HOSPITAL OBSERVATION PER HR: HCPCS

## 2022-10-20 PROCEDURE — 25010000002 PIPERACILLIN SOD-TAZOBACTAM PER 1 G: Performed by: HOSPITALIST

## 2022-10-20 PROCEDURE — 96366 THER/PROPH/DIAG IV INF ADDON: CPT

## 2022-10-20 PROCEDURE — 25010000002 SODIUM CHLORIDE 0.9 % WITH KCL 20 MEQ 20-0.9 MEQ/L-% SOLUTION: Performed by: HOSPITALIST

## 2022-10-20 RX ORDER — CEPHALEXIN 500 MG/1
500 CAPSULE ORAL EVERY 8 HOURS SCHEDULED
Status: DISCONTINUED | OUTPATIENT
Start: 2022-10-20 | End: 2022-10-25

## 2022-10-20 RX ADMIN — MULTIPLE VITAMINS W/ MINERALS TAB 1 TABLET: TAB at 09:05

## 2022-10-20 RX ADMIN — TAZOBACTAM SODIUM AND PIPERACILLIN SODIUM 3.38 G: 375; 3 INJECTION, SOLUTION INTRAVENOUS at 01:05

## 2022-10-20 RX ADMIN — QUETIAPINE FUMARATE 200 MG: 200 TABLET ORAL at 21:02

## 2022-10-20 RX ADMIN — POTASSIUM CHLORIDE AND SODIUM CHLORIDE 75 ML/HR: 900; 150 INJECTION, SOLUTION INTRAVENOUS at 06:30

## 2022-10-20 RX ADMIN — FOLIC ACID 1 MG: 1 TABLET ORAL at 09:05

## 2022-10-20 RX ADMIN — PANTOPRAZOLE SODIUM 40 MG: 40 TABLET, DELAYED RELEASE ORAL at 17:42

## 2022-10-20 RX ADMIN — APIXABAN 5 MG: 5 TABLET, FILM COATED ORAL at 09:05

## 2022-10-20 RX ADMIN — TAZOBACTAM SODIUM AND PIPERACILLIN SODIUM 3.38 G: 375; 3 INJECTION, SOLUTION INTRAVENOUS at 06:30

## 2022-10-20 RX ADMIN — PANTOPRAZOLE SODIUM 40 MG: 40 TABLET, DELAYED RELEASE ORAL at 06:30

## 2022-10-20 RX ADMIN — CEPHALEXIN 500 MG: 500 CAPSULE ORAL at 13:41

## 2022-10-20 RX ADMIN — SUCRALFATE 1 G: 1 TABLET ORAL at 06:30

## 2022-10-20 RX ADMIN — SUCRALFATE 1 G: 1 TABLET ORAL at 17:42

## 2022-10-20 RX ADMIN — CEPHALEXIN 500 MG: 500 CAPSULE ORAL at 21:02

## 2022-10-20 RX ADMIN — Medication 100 MG: at 09:05

## 2022-10-20 RX ADMIN — SUCRALFATE 1 G: 1 TABLET ORAL at 11:53

## 2022-10-20 RX ADMIN — APIXABAN 5 MG: 5 TABLET, FILM COATED ORAL at 21:03

## 2022-10-21 LAB
ANION GAP SERPL CALCULATED.3IONS-SCNC: 9.6 MMOL/L (ref 5–15)
BUN SERPL-MCNC: 10 MG/DL (ref 6–20)
BUN/CREAT SERPL: 12.5 (ref 7–25)
CALCIUM SPEC-SCNC: 9 MG/DL (ref 8.6–10.5)
CHLORIDE SERPL-SCNC: 107 MMOL/L (ref 98–107)
CO2 SERPL-SCNC: 25.4 MMOL/L (ref 22–29)
CREAT SERPL-MCNC: 0.8 MG/DL (ref 0.76–1.27)
DEPRECATED RDW RBC AUTO: 52.7 FL (ref 37–54)
EGFRCR SERPLBLD CKD-EPI 2021: 104.5 ML/MIN/1.73
ERYTHROCYTE [DISTWIDTH] IN BLOOD BY AUTOMATED COUNT: 16.3 % (ref 12.3–15.4)
GLUCOSE SERPL-MCNC: 110 MG/DL (ref 65–99)
HCT VFR BLD AUTO: 32.7 % (ref 37.5–51)
HGB BLD-MCNC: 11.1 G/DL (ref 13–17.7)
MCH RBC QN AUTO: 30 PG (ref 26.6–33)
MCHC RBC AUTO-ENTMCNC: 33.9 G/DL (ref 31.5–35.7)
MCV RBC AUTO: 88.4 FL (ref 79–97)
PLATELET # BLD AUTO: 921 10*3/MM3 (ref 140–450)
PMV BLD AUTO: 9.9 FL (ref 6–12)
POTASSIUM SERPL-SCNC: 4 MMOL/L (ref 3.5–5.2)
RBC # BLD AUTO: 3.7 10*6/MM3 (ref 4.14–5.8)
SODIUM SERPL-SCNC: 142 MMOL/L (ref 136–145)
WBC NRBC COR # BLD: 7.22 10*3/MM3 (ref 3.4–10.8)

## 2022-10-21 PROCEDURE — G0378 HOSPITAL OBSERVATION PER HR: HCPCS

## 2022-10-21 PROCEDURE — 80048 BASIC METABOLIC PNL TOTAL CA: CPT | Performed by: INTERNAL MEDICINE

## 2022-10-21 PROCEDURE — 85027 COMPLETE CBC AUTOMATED: CPT | Performed by: INTERNAL MEDICINE

## 2022-10-21 PROCEDURE — 86480 TB TEST CELL IMMUN MEASURE: CPT | Performed by: HOSPITALIST

## 2022-10-21 RX ADMIN — Medication 100 MG: at 08:49

## 2022-10-21 RX ADMIN — CEPHALEXIN 500 MG: 500 CAPSULE ORAL at 13:57

## 2022-10-21 RX ADMIN — CEPHALEXIN 500 MG: 500 CAPSULE ORAL at 22:06

## 2022-10-21 RX ADMIN — SUCRALFATE 1 G: 1 TABLET ORAL at 08:50

## 2022-10-21 RX ADMIN — SUCRALFATE 1 G: 1 TABLET ORAL at 17:11

## 2022-10-21 RX ADMIN — Medication 10 ML: at 08:50

## 2022-10-21 RX ADMIN — CEPHALEXIN 500 MG: 500 CAPSULE ORAL at 05:04

## 2022-10-21 RX ADMIN — APIXABAN 5 MG: 5 TABLET, FILM COATED ORAL at 08:49

## 2022-10-21 RX ADMIN — PANTOPRAZOLE SODIUM 40 MG: 40 TABLET, DELAYED RELEASE ORAL at 17:11

## 2022-10-21 RX ADMIN — FOLIC ACID 1 MG: 1 TABLET ORAL at 08:49

## 2022-10-21 RX ADMIN — SUCRALFATE 1 G: 1 TABLET ORAL at 11:55

## 2022-10-21 RX ADMIN — PANTOPRAZOLE SODIUM 40 MG: 40 TABLET, DELAYED RELEASE ORAL at 08:50

## 2022-10-21 RX ADMIN — MULTIPLE VITAMINS W/ MINERALS TAB 1 TABLET: TAB at 08:49

## 2022-10-22 PROCEDURE — G0378 HOSPITAL OBSERVATION PER HR: HCPCS

## 2022-10-22 RX ADMIN — SUCRALFATE 1 G: 1 TABLET ORAL at 17:44

## 2022-10-22 RX ADMIN — PANTOPRAZOLE SODIUM 40 MG: 40 TABLET, DELAYED RELEASE ORAL at 08:59

## 2022-10-22 RX ADMIN — CEPHALEXIN 500 MG: 500 CAPSULE ORAL at 20:47

## 2022-10-22 RX ADMIN — APIXABAN 5 MG: 5 TABLET, FILM COATED ORAL at 01:20

## 2022-10-22 RX ADMIN — SUCRALFATE 1 G: 1 TABLET ORAL at 12:27

## 2022-10-22 RX ADMIN — FOLIC ACID 1 MG: 1 TABLET ORAL at 08:59

## 2022-10-22 RX ADMIN — APIXABAN 5 MG: 5 TABLET, FILM COATED ORAL at 08:59

## 2022-10-22 RX ADMIN — Medication 100 MG: at 08:59

## 2022-10-22 RX ADMIN — MULTIPLE VITAMINS W/ MINERALS TAB 1 TABLET: TAB at 08:59

## 2022-10-22 RX ADMIN — SUCRALFATE 1 G: 1 TABLET ORAL at 09:00

## 2022-10-22 RX ADMIN — PANTOPRAZOLE SODIUM 40 MG: 40 TABLET, DELAYED RELEASE ORAL at 17:44

## 2022-10-22 RX ADMIN — APIXABAN 5 MG: 5 TABLET, FILM COATED ORAL at 20:47

## 2022-10-22 RX ADMIN — CEPHALEXIN 500 MG: 500 CAPSULE ORAL at 17:44

## 2022-10-22 RX ADMIN — QUETIAPINE FUMARATE 200 MG: 200 TABLET ORAL at 20:47

## 2022-10-22 RX ADMIN — CEPHALEXIN 500 MG: 500 CAPSULE ORAL at 06:24

## 2022-10-23 LAB
BACTERIA SPEC AEROBE CULT: NORMAL
BACTERIA SPEC AEROBE CULT: NORMAL

## 2022-10-23 PROCEDURE — G0378 HOSPITAL OBSERVATION PER HR: HCPCS

## 2022-10-23 RX ADMIN — PANTOPRAZOLE SODIUM 40 MG: 40 TABLET, DELAYED RELEASE ORAL at 16:59

## 2022-10-23 RX ADMIN — APIXABAN 5 MG: 5 TABLET, FILM COATED ORAL at 20:52

## 2022-10-23 RX ADMIN — QUETIAPINE FUMARATE 200 MG: 200 TABLET ORAL at 20:52

## 2022-10-23 RX ADMIN — FOLIC ACID 1 MG: 1 TABLET ORAL at 09:19

## 2022-10-23 RX ADMIN — CEPHALEXIN 500 MG: 500 CAPSULE ORAL at 20:53

## 2022-10-23 RX ADMIN — SUCRALFATE 1 G: 1 TABLET ORAL at 12:19

## 2022-10-23 RX ADMIN — MULTIPLE VITAMINS W/ MINERALS TAB 1 TABLET: TAB at 09:19

## 2022-10-23 RX ADMIN — PANTOPRAZOLE SODIUM 40 MG: 40 TABLET, DELAYED RELEASE ORAL at 06:29

## 2022-10-23 RX ADMIN — SUCRALFATE 1 G: 1 TABLET ORAL at 06:29

## 2022-10-23 RX ADMIN — Medication 100 MG: at 09:19

## 2022-10-23 RX ADMIN — CEPHALEXIN 500 MG: 500 CAPSULE ORAL at 12:19

## 2022-10-23 RX ADMIN — SUCRALFATE 1 G: 1 TABLET ORAL at 16:59

## 2022-10-23 RX ADMIN — APIXABAN 5 MG: 5 TABLET, FILM COATED ORAL at 09:19

## 2022-10-23 RX ADMIN — CEPHALEXIN 500 MG: 500 CAPSULE ORAL at 06:29

## 2022-10-24 PROCEDURE — G0378 HOSPITAL OBSERVATION PER HR: HCPCS

## 2022-10-24 RX ADMIN — CEPHALEXIN 500 MG: 500 CAPSULE ORAL at 06:19

## 2022-10-24 RX ADMIN — Medication 100 MG: at 08:11

## 2022-10-24 RX ADMIN — APIXABAN 5 MG: 5 TABLET, FILM COATED ORAL at 21:16

## 2022-10-24 RX ADMIN — PANTOPRAZOLE SODIUM 40 MG: 40 TABLET, DELAYED RELEASE ORAL at 06:19

## 2022-10-24 RX ADMIN — SUCRALFATE 1 G: 1 TABLET ORAL at 17:02

## 2022-10-24 RX ADMIN — SUCRALFATE 1 G: 1 TABLET ORAL at 11:58

## 2022-10-24 RX ADMIN — CEPHALEXIN 500 MG: 500 CAPSULE ORAL at 21:16

## 2022-10-24 RX ADMIN — MULTIPLE VITAMINS W/ MINERALS TAB 1 TABLET: TAB at 08:11

## 2022-10-24 RX ADMIN — APIXABAN 5 MG: 5 TABLET, FILM COATED ORAL at 08:11

## 2022-10-24 RX ADMIN — FOLIC ACID 1 MG: 1 TABLET ORAL at 08:11

## 2022-10-24 RX ADMIN — CEPHALEXIN 500 MG: 500 CAPSULE ORAL at 14:36

## 2022-10-24 RX ADMIN — PANTOPRAZOLE SODIUM 40 MG: 40 TABLET, DELAYED RELEASE ORAL at 17:02

## 2022-10-24 RX ADMIN — SUCRALFATE 1 G: 1 TABLET ORAL at 06:19

## 2022-10-24 RX ADMIN — QUETIAPINE FUMARATE 200 MG: 200 TABLET ORAL at 21:16

## 2022-10-25 LAB
ALBUMIN SERPL-MCNC: 3.9 G/DL (ref 3.5–5.2)
ALBUMIN/GLOB SERPL: 1.2 G/DL
ALP SERPL-CCNC: 69 U/L (ref 39–117)
ALT SERPL W P-5'-P-CCNC: 30 U/L (ref 1–41)
ANION GAP SERPL CALCULATED.3IONS-SCNC: 13.7 MMOL/L (ref 5–15)
AST SERPL-CCNC: 28 U/L (ref 1–40)
BASOPHILS # BLD AUTO: 0.08 10*3/MM3 (ref 0–0.2)
BASOPHILS NFR BLD AUTO: 1.1 % (ref 0–1.5)
BILIRUB SERPL-MCNC: <0.2 MG/DL (ref 0–1.2)
BUN SERPL-MCNC: 15 MG/DL (ref 6–20)
BUN/CREAT SERPL: 16.9 (ref 7–25)
CALCIUM SPEC-SCNC: 9.6 MG/DL (ref 8.6–10.5)
CHLORIDE SERPL-SCNC: 103 MMOL/L (ref 98–107)
CO2 SERPL-SCNC: 20.3 MMOL/L (ref 22–29)
CREAT SERPL-MCNC: 0.89 MG/DL (ref 0.76–1.27)
DEPRECATED RDW RBC AUTO: 55.6 FL (ref 37–54)
EGFRCR SERPLBLD CKD-EPI 2021: 101.2 ML/MIN/1.73
EOSINOPHIL # BLD AUTO: 0.25 10*3/MM3 (ref 0–0.4)
EOSINOPHIL NFR BLD AUTO: 3.5 % (ref 0.3–6.2)
ERYTHROCYTE [DISTWIDTH] IN BLOOD BY AUTOMATED COUNT: 16.4 % (ref 12.3–15.4)
GAMMA INTERFERON BACKGROUND BLD IA-ACNC: 0.03 IU/ML
GLOBULIN UR ELPH-MCNC: 3.3 GM/DL
GLUCOSE SERPL-MCNC: 123 MG/DL (ref 65–99)
HCT VFR BLD AUTO: 38.9 % (ref 37.5–51)
HGB BLD-MCNC: 12.6 G/DL (ref 13–17.7)
IMM GRANULOCYTES # BLD AUTO: 0.06 10*3/MM3 (ref 0–0.05)
IMM GRANULOCYTES NFR BLD AUTO: 0.8 % (ref 0–0.5)
LYMPHOCYTES # BLD AUTO: 1.86 10*3/MM3 (ref 0.7–3.1)
LYMPHOCYTES NFR BLD AUTO: 25.8 % (ref 19.6–45.3)
M TB IFN-G BLD-IMP: NEGATIVE
M TB IFN-G CD4+ BCKGRND COR BLD-ACNC: 0.01 IU/ML
M TB IFN-G CD4+CD8+ BCKGRND COR BLD-ACNC: 0.01 IU/ML
MCH RBC QN AUTO: 29.6 PG (ref 26.6–33)
MCHC RBC AUTO-ENTMCNC: 32.4 G/DL (ref 31.5–35.7)
MCV RBC AUTO: 91.3 FL (ref 79–97)
MITOGEN IGNF BLD-ACNC: >10 IU/ML
MONOCYTES # BLD AUTO: 0.87 10*3/MM3 (ref 0.1–0.9)
MONOCYTES NFR BLD AUTO: 12.1 % (ref 5–12)
NEUTROPHILS NFR BLD AUTO: 4.09 10*3/MM3 (ref 1.7–7)
NEUTROPHILS NFR BLD AUTO: 56.7 % (ref 42.7–76)
NRBC BLD AUTO-RTO: 0 /100 WBC (ref 0–0.2)
PLATELET # BLD AUTO: 733 10*3/MM3 (ref 140–450)
PMV BLD AUTO: 9.5 FL (ref 6–12)
POTASSIUM SERPL-SCNC: 4.3 MMOL/L (ref 3.5–5.2)
PROT SERPL-MCNC: 7.2 G/DL (ref 6–8.5)
QUANTIFERON INCUBATION: NORMAL
RBC # BLD AUTO: 4.26 10*6/MM3 (ref 4.14–5.8)
SERVICE CMNT-IMP: NORMAL
SODIUM SERPL-SCNC: 137 MMOL/L (ref 136–145)
WBC NRBC COR # BLD: 7.21 10*3/MM3 (ref 3.4–10.8)

## 2022-10-25 PROCEDURE — 80053 COMPREHEN METABOLIC PANEL: CPT | Performed by: INTERNAL MEDICINE

## 2022-10-25 PROCEDURE — 85025 COMPLETE CBC W/AUTO DIFF WBC: CPT | Performed by: INTERNAL MEDICINE

## 2022-10-25 PROCEDURE — G0378 HOSPITAL OBSERVATION PER HR: HCPCS

## 2022-10-25 RX ORDER — CEPHALEXIN 500 MG/1
500 CAPSULE ORAL EVERY 6 HOURS SCHEDULED
Status: DISCONTINUED | OUTPATIENT
Start: 2022-10-25 | End: 2022-10-27 | Stop reason: HOSPADM

## 2022-10-25 RX ADMIN — QUETIAPINE FUMARATE 200 MG: 200 TABLET ORAL at 21:38

## 2022-10-25 RX ADMIN — SUCRALFATE 1 G: 1 TABLET ORAL at 11:54

## 2022-10-25 RX ADMIN — SUCRALFATE 1 G: 1 TABLET ORAL at 06:25

## 2022-10-25 RX ADMIN — CEPHALEXIN 500 MG: 500 CAPSULE ORAL at 11:54

## 2022-10-25 RX ADMIN — MULTIPLE VITAMINS W/ MINERALS TAB 1 TABLET: TAB at 07:38

## 2022-10-25 RX ADMIN — APIXABAN 5 MG: 5 TABLET, FILM COATED ORAL at 21:38

## 2022-10-25 RX ADMIN — SUCRALFATE 1 G: 1 TABLET ORAL at 17:32

## 2022-10-25 RX ADMIN — CEPHALEXIN 500 MG: 500 CAPSULE ORAL at 17:32

## 2022-10-25 RX ADMIN — APIXABAN 5 MG: 5 TABLET, FILM COATED ORAL at 07:38

## 2022-10-25 RX ADMIN — CEPHALEXIN 500 MG: 500 CAPSULE ORAL at 06:25

## 2022-10-25 RX ADMIN — Medication 100 MG: at 07:38

## 2022-10-25 RX ADMIN — PANTOPRAZOLE SODIUM 40 MG: 40 TABLET, DELAYED RELEASE ORAL at 17:32

## 2022-10-25 RX ADMIN — FOLIC ACID 1 MG: 1 TABLET ORAL at 07:38

## 2022-10-25 RX ADMIN — PANTOPRAZOLE SODIUM 40 MG: 40 TABLET, DELAYED RELEASE ORAL at 06:25

## 2022-10-26 LAB
DEPRECATED RDW RBC AUTO: 51.3 FL (ref 37–54)
ERYTHROCYTE [DISTWIDTH] IN BLOOD BY AUTOMATED COUNT: 16.1 % (ref 12.3–15.4)
HCT VFR BLD AUTO: 38.6 % (ref 37.5–51)
HGB BLD-MCNC: 13 G/DL (ref 13–17.7)
MCH RBC QN AUTO: 29.1 PG (ref 26.6–33)
MCHC RBC AUTO-ENTMCNC: 33.7 G/DL (ref 31.5–35.7)
MCV RBC AUTO: 86.4 FL (ref 79–97)
PLATELET # BLD AUTO: 698 10*3/MM3 (ref 140–450)
PMV BLD AUTO: 9.1 FL (ref 6–12)
RBC # BLD AUTO: 4.47 10*6/MM3 (ref 4.14–5.8)
WBC NRBC COR # BLD: 7.32 10*3/MM3 (ref 3.4–10.8)

## 2022-10-26 PROCEDURE — 85027 COMPLETE CBC AUTOMATED: CPT | Performed by: INTERNAL MEDICINE

## 2022-10-26 PROCEDURE — G0378 HOSPITAL OBSERVATION PER HR: HCPCS

## 2022-10-26 RX ADMIN — CEPHALEXIN 500 MG: 500 CAPSULE ORAL at 11:13

## 2022-10-26 RX ADMIN — CEPHALEXIN 500 MG: 500 CAPSULE ORAL at 00:30

## 2022-10-26 RX ADMIN — Medication 100 MG: at 11:13

## 2022-10-26 RX ADMIN — CEPHALEXIN 500 MG: 500 CAPSULE ORAL at 18:03

## 2022-10-26 RX ADMIN — PANTOPRAZOLE SODIUM 40 MG: 40 TABLET, DELAYED RELEASE ORAL at 18:03

## 2022-10-26 RX ADMIN — PANTOPRAZOLE SODIUM 40 MG: 40 TABLET, DELAYED RELEASE ORAL at 06:56

## 2022-10-26 RX ADMIN — CEPHALEXIN 500 MG: 500 CAPSULE ORAL at 06:56

## 2022-10-26 RX ADMIN — MULTIPLE VITAMINS W/ MINERALS TAB 1 TABLET: TAB at 11:13

## 2022-10-26 RX ADMIN — APIXABAN 5 MG: 5 TABLET, FILM COATED ORAL at 11:13

## 2022-10-26 RX ADMIN — APIXABAN 5 MG: 5 TABLET, FILM COATED ORAL at 23:25

## 2022-10-26 RX ADMIN — FOLIC ACID 1 MG: 1 TABLET ORAL at 11:13

## 2022-10-26 RX ADMIN — CEPHALEXIN 500 MG: 500 CAPSULE ORAL at 23:26

## 2022-10-26 RX ADMIN — SUCRALFATE 1 G: 1 TABLET ORAL at 11:13

## 2022-10-26 RX ADMIN — QUETIAPINE FUMARATE 200 MG: 200 TABLET ORAL at 23:26

## 2022-10-27 VITALS
OXYGEN SATURATION: 95 % | BODY MASS INDEX: 25.48 KG/M2 | WEIGHT: 178 LBS | DIASTOLIC BLOOD PRESSURE: 81 MMHG | HEART RATE: 89 BPM | HEIGHT: 70 IN | TEMPERATURE: 97.2 F | RESPIRATION RATE: 18 BRPM | SYSTOLIC BLOOD PRESSURE: 117 MMHG

## 2022-10-27 LAB
DEPRECATED RDW RBC AUTO: 54.6 FL (ref 37–54)
ERYTHROCYTE [DISTWIDTH] IN BLOOD BY AUTOMATED COUNT: 16.5 % (ref 12.3–15.4)
HCT VFR BLD AUTO: 39.4 % (ref 37.5–51)
HGB BLD-MCNC: 12.7 G/DL (ref 13–17.7)
MCH RBC QN AUTO: 29.1 PG (ref 26.6–33)
MCHC RBC AUTO-ENTMCNC: 32.2 G/DL (ref 31.5–35.7)
MCV RBC AUTO: 90.2 FL (ref 79–97)
PLATELET # BLD AUTO: 670 10*3/MM3 (ref 140–450)
PMV BLD AUTO: 9.6 FL (ref 6–12)
RBC # BLD AUTO: 4.37 10*6/MM3 (ref 4.14–5.8)
WBC NRBC COR # BLD: 7.02 10*3/MM3 (ref 3.4–10.8)

## 2022-10-27 PROCEDURE — G0378 HOSPITAL OBSERVATION PER HR: HCPCS

## 2022-10-27 PROCEDURE — 85027 COMPLETE CBC AUTOMATED: CPT | Performed by: INTERNAL MEDICINE

## 2022-10-27 RX ORDER — FOLIC ACID 1 MG/1
1 TABLET ORAL DAILY
Qty: 30 TABLET | Refills: 0 | Status: SHIPPED | OUTPATIENT
Start: 2022-10-28

## 2022-10-27 RX ORDER — LANOLIN ALCOHOL/MO/W.PET/CERES
100 CREAM (GRAM) TOPICAL DAILY
Qty: 30 TABLET | Refills: 0 | Status: SHIPPED | OUTPATIENT
Start: 2022-10-28

## 2022-10-27 RX ORDER — QUETIAPINE FUMARATE 200 MG/1
200 TABLET, FILM COATED ORAL NIGHTLY
Qty: 30 TABLET | Refills: 0 | Status: SHIPPED | OUTPATIENT
Start: 2022-10-27

## 2022-10-27 RX ADMIN — MULTIPLE VITAMINS W/ MINERALS TAB 1 TABLET: TAB at 08:50

## 2022-10-27 RX ADMIN — PANTOPRAZOLE SODIUM 40 MG: 40 TABLET, DELAYED RELEASE ORAL at 05:55

## 2022-10-27 RX ADMIN — FOLIC ACID 1 MG: 1 TABLET ORAL at 08:50

## 2022-10-27 RX ADMIN — APIXABAN 5 MG: 5 TABLET, FILM COATED ORAL at 08:50

## 2022-10-27 RX ADMIN — SUCRALFATE 1 G: 1 TABLET ORAL at 05:55

## 2022-10-27 RX ADMIN — SUCRALFATE 1 G: 1 TABLET ORAL at 12:38

## 2022-10-27 RX ADMIN — Medication 100 MG: at 08:50

## 2022-10-27 RX ADMIN — CEPHALEXIN 500 MG: 500 CAPSULE ORAL at 12:38

## 2022-10-27 RX ADMIN — CEPHALEXIN 500 MG: 500 CAPSULE ORAL at 05:55

## 2022-11-05 PROCEDURE — 99285 EMERGENCY DEPT VISIT HI MDM: CPT

## 2022-11-05 PROCEDURE — 36415 COLL VENOUS BLD VENIPUNCTURE: CPT

## 2022-11-06 ENCOUNTER — APPOINTMENT (OUTPATIENT)
Dept: CT IMAGING | Facility: HOSPITAL | Age: 57
End: 2022-11-06

## 2022-11-06 ENCOUNTER — HOSPITAL ENCOUNTER (EMERGENCY)
Facility: HOSPITAL | Age: 57
Discharge: PSYCHIATRIC OR SUBSTANCE USE FACILITY | End: 2022-11-07
Attending: EMERGENCY MEDICINE | Admitting: EMERGENCY MEDICINE

## 2022-11-06 DIAGNOSIS — R45.851 SUICIDAL IDEATION: Primary | ICD-10-CM

## 2022-11-06 DIAGNOSIS — Z91.14 H/O MEDICATION NONCOMPLIANCE: ICD-10-CM

## 2022-11-06 DIAGNOSIS — R44.3 HALLUCINATIONS: ICD-10-CM

## 2022-11-06 LAB
ALBUMIN SERPL-MCNC: 3.6 G/DL (ref 3.5–5.2)
ALBUMIN/GLOB SERPL: 1.2 G/DL
ALP SERPL-CCNC: 79 U/L (ref 39–117)
ALT SERPL W P-5'-P-CCNC: 18 U/L (ref 1–41)
AMPHET+METHAMPHET UR QL: POSITIVE
ANION GAP SERPL CALCULATED.3IONS-SCNC: 14 MMOL/L (ref 5–15)
AST SERPL-CCNC: 28 U/L (ref 1–40)
BARBITURATES UR QL SCN: NEGATIVE
BASOPHILS # BLD AUTO: 0.04 10*3/MM3 (ref 0–0.2)
BASOPHILS NFR BLD AUTO: 0.5 % (ref 0–1.5)
BENZODIAZ UR QL SCN: NEGATIVE
BILIRUB SERPL-MCNC: 1 MG/DL (ref 0–1.2)
BUN SERPL-MCNC: 11 MG/DL (ref 6–20)
BUN/CREAT SERPL: 13.1 (ref 7–25)
CALCIUM SPEC-SCNC: 8.9 MG/DL (ref 8.6–10.5)
CANNABINOIDS SERPL QL: NEGATIVE
CHLORIDE SERPL-SCNC: 102 MMOL/L (ref 98–107)
CO2 SERPL-SCNC: 22 MMOL/L (ref 22–29)
COCAINE UR QL: NEGATIVE
CREAT SERPL-MCNC: 0.84 MG/DL (ref 0.76–1.27)
DEPRECATED RDW RBC AUTO: 51.2 FL (ref 37–54)
EGFRCR SERPLBLD CKD-EPI 2021: 103 ML/MIN/1.73
EOSINOPHIL # BLD AUTO: 0.25 10*3/MM3 (ref 0–0.4)
EOSINOPHIL NFR BLD AUTO: 3 % (ref 0.3–6.2)
ERYTHROCYTE [DISTWIDTH] IN BLOOD BY AUTOMATED COUNT: 16 % (ref 12.3–15.4)
ETHANOL BLD-MCNC: <10 MG/DL (ref 0–10)
ETHANOL UR QL: <0.01 %
GLOBULIN UR ELPH-MCNC: 3.1 GM/DL
GLUCOSE SERPL-MCNC: 94 MG/DL (ref 65–99)
HCT VFR BLD AUTO: 33.1 % (ref 37.5–51)
HGB BLD-MCNC: 11.2 G/DL (ref 13–17.7)
IMM GRANULOCYTES # BLD AUTO: 0.02 10*3/MM3 (ref 0–0.05)
IMM GRANULOCYTES NFR BLD AUTO: 0.2 % (ref 0–0.5)
LYMPHOCYTES # BLD AUTO: 1.4 10*3/MM3 (ref 0.7–3.1)
LYMPHOCYTES NFR BLD AUTO: 17 % (ref 19.6–45.3)
MCH RBC QN AUTO: 29.5 PG (ref 26.6–33)
MCHC RBC AUTO-ENTMCNC: 33.8 G/DL (ref 31.5–35.7)
MCV RBC AUTO: 87.1 FL (ref 79–97)
METHADONE UR QL SCN: NEGATIVE
MONOCYTES # BLD AUTO: 1 10*3/MM3 (ref 0.1–0.9)
MONOCYTES NFR BLD AUTO: 12.2 % (ref 5–12)
NEUTROPHILS NFR BLD AUTO: 5.51 10*3/MM3 (ref 1.7–7)
NEUTROPHILS NFR BLD AUTO: 67.1 % (ref 42.7–76)
NRBC BLD AUTO-RTO: 0 /100 WBC (ref 0–0.2)
OPIATES UR QL: NEGATIVE
OXYCODONE UR QL SCN: NEGATIVE
PLATELET # BLD AUTO: 311 10*3/MM3 (ref 140–450)
PMV BLD AUTO: 11.6 FL (ref 6–12)
POTASSIUM SERPL-SCNC: 3.3 MMOL/L (ref 3.5–5.2)
PROT SERPL-MCNC: 6.7 G/DL (ref 6–8.5)
RBC # BLD AUTO: 3.8 10*6/MM3 (ref 4.14–5.8)
SARS-COV-2 RNA PNL SPEC NAA+PROBE: NOT DETECTED
SODIUM SERPL-SCNC: 138 MMOL/L (ref 136–145)
WBC NRBC COR # BLD: 8.22 10*3/MM3 (ref 3.4–10.8)

## 2022-11-06 PROCEDURE — 85025 COMPLETE CBC W/AUTO DIFF WBC: CPT | Performed by: PHYSICIAN ASSISTANT

## 2022-11-06 PROCEDURE — 80307 DRUG TEST PRSMV CHEM ANLYZR: CPT | Performed by: PHYSICIAN ASSISTANT

## 2022-11-06 PROCEDURE — 90715 TDAP VACCINE 7 YRS/> IM: CPT | Performed by: PHYSICIAN ASSISTANT

## 2022-11-06 PROCEDURE — 70450 CT HEAD/BRAIN W/O DYE: CPT

## 2022-11-06 PROCEDURE — 90471 IMMUNIZATION ADMIN: CPT | Performed by: PHYSICIAN ASSISTANT

## 2022-11-06 PROCEDURE — 25010000002 TETANUS-DIPHTH-ACELL PERTUSSIS 5-2.5-18.5 LF-MCG/0.5 SUSPENSION PREFILLED SYRINGE: Performed by: PHYSICIAN ASSISTANT

## 2022-11-06 PROCEDURE — 82077 ASSAY SPEC XCP UR&BREATH IA: CPT | Performed by: PHYSICIAN ASSISTANT

## 2022-11-06 PROCEDURE — 80053 COMPREHEN METABOLIC PANEL: CPT | Performed by: PHYSICIAN ASSISTANT

## 2022-11-06 PROCEDURE — 87635 SARS-COV-2 COVID-19 AMP PRB: CPT | Performed by: PHYSICIAN ASSISTANT

## 2022-11-06 RX ORDER — IBUPROFEN 800 MG/1
800 TABLET ORAL ONCE
Status: COMPLETED | OUTPATIENT
Start: 2022-11-06 | End: 2022-11-06

## 2022-11-06 RX ADMIN — TETANUS TOXOID, REDUCED DIPHTHERIA TOXOID AND ACELLULAR PERTUSSIS VACCINE, ADSORBED 0.5 ML: 5; 2.5; 8; 8; 2.5 SUSPENSION INTRAMUSCULAR at 07:37

## 2022-11-06 RX ADMIN — IBUPROFEN 800 MG: 800 TABLET, FILM COATED ORAL at 21:41

## 2022-11-06 NOTE — CASE MANAGEMENT/SOCIAL WORK
Spoke with Horacio at [facility] and she confirmed they received pt packet and are in process of review.
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
## 2022-11-06 NOTE — CASE MANAGEMENT/SOCIAL WORK
Per Marita at [facility] no beds are available.     Per Debra at [facility] beds are available and pt packet has been faxed for review.

## 2022-11-06 NOTE — CASE MANAGEMENT/SOCIAL WORK
Per Barbara at [facility] they do not have any beds available for the pt but stated that we should try again tomorrow if pt still does not have placement.     Per Margot at [facility] they do not have any beds available.     Per Munir at [facility] there may be a bed for the pt, packet has been faxed for review.

## 2022-11-06 NOTE — CASE MANAGEMENT/SOCIAL WORK
[facility] is seeking placement for this patient.    [facility] said they may have a bed for pt, packet has been faxed for review.
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
## 2022-11-07 VITALS
OXYGEN SATURATION: 98 % | BODY MASS INDEX: 24.83 KG/M2 | TEMPERATURE: 97.9 F | HEART RATE: 71 BPM | DIASTOLIC BLOOD PRESSURE: 94 MMHG | SYSTOLIC BLOOD PRESSURE: 143 MMHG | HEIGHT: 71 IN | RESPIRATION RATE: 18 BRPM

## 2022-11-07 RX ORDER — IBUPROFEN 800 MG/1
800 TABLET ORAL ONCE
Status: COMPLETED | OUTPATIENT
Start: 2022-11-07 | End: 2022-11-07

## 2022-11-07 RX ORDER — QUETIAPINE FUMARATE 200 MG/1
200 TABLET, FILM COATED ORAL 2 TIMES DAILY
Status: DISCONTINUED | OUTPATIENT
Start: 2022-11-07 | End: 2022-11-07 | Stop reason: HOSPADM

## 2022-11-07 RX ADMIN — IBUPROFEN 800 MG: 800 TABLET, FILM COATED ORAL at 12:55

## 2022-11-07 NOTE — CASE MANAGEMENT/SOCIAL WORK
Spoke w/ [facility] and arranged ambulance transport to [facility]. ETA 1730. RN updated

## 2022-11-07 NOTE — CONSULTS
IDENTIFYING INFORMATION: The patient is a 57-year-old white male well known to this physician.  He was admitted to the emergency department with complaints of suicidal ideations.    CHIEF COMPLAINT: None given    INFORMANT: Patient and chart    RELIABILITY: Limited    HISTORY OF PRESENT ILLNESS: The patient is a 57-year-old white male known to this physician from multiple previous admissions to our [facility].  While I have not been his attending at that facility I have very familiar with him.  The patient has a history of schizophrenia as well as methamphetamine abuse.  His drug screen was positive for methamphetamine on admission.  He continues to endorse positive suicidal and homicidal ideations as well as auditory hallucinations when seen today.  He is, however, able to promise safety in the hospital when informed that we will attempt to have him admitted to the [facility] or our [facility] as soon as possible.    PAST PSYCHIATRIC HISTORY: The patient is currently psychiatrically ill and homeless.  Significant for history of DVT, GERD    PAST MEDICAL HISTORY: Significant for history of DVT and GERD    MEDICATIONS: (Not in a hospital admission)        ALLERGIES: Penicillin G    FAMILY HISTORY: Not obtained    SOCIAL HISTORY: The patient is homeless and chronically ill.    MENTAL STATUS EXAM: Patient is a well-developed lorries white male appearing his stated age.  He is in a state of some dishevelment and of note is a large tattoo noted on the patient's forehead.  The patient is awake alert and oriented seers.  His mood is euthymic his affect blunted and strange.  There are no gross deficits in memory or cognition noted.  Intelligence is judged to be in the average range based on fund of knowledge, the patient is currently off interview.  He is currently endorsing positive suicidal and vague homicidal ideations he denies he also is reporting positive auditory hallucinations.  His judgment  and sight appear to be at baseline.  No signs of substance withdrawal are in evidence.    ASSETS/LIABILITIES: To be assessed/homelessness, lack of resources, ongoing substance use    DIAGNOSTIC IMPRESSION: Schizophrenia chronic, methamphetamine use disorder    PLAN: The patient is able to promise safety in the hospital so his sitter can be discontinued while we attempt to make arrangements for psychiatric hospitalization for the patient.  Thank you for the opportunity to see them

## 2022-11-07 NOTE — CASE MANAGEMENT/SOCIAL WORK
"Received a call from Lizet at [facility]. They have declined the patient due to \"a history of aggression.\"  "

## 2022-11-07 NOTE — CASE MANAGEMENT/SOCIAL WORK
I called the patients guardian, Rahul Wilson, to notify him that [facility] was here to transport the patient to the [facility].  Permission given by Mr. Wilson to transport the patient.  EMS (Norm Santoyo) witnessed the phone call made to Mr. Wilson and will transport the patient now.

## 2022-11-07 NOTE — CASE MANAGEMENT/SOCIAL WORK
Spoke w/ guardian, Rahul Wilson, per phone and he acknowledged that he agreed w/ patient being transported to [facility] for treatment and had already spoken w/ [facility] re this (see [facility] note). He also consented to [facility] ambulance transport to [facility]

## 2022-11-07 NOTE — PROGRESS NOTES
[facility] spoke with guardian, Rahul Ninfakeysha, who gave verbal permission for pt to be admitted to [facility] for psychiatric treatment. He stated wanting to be notified once he has left [facility] and can be reached on his mobile work cell at [phone] between 7:30a-3:30p and able to leave a voicemail.     This writer contacted ED RN, Marline, notified guardian gave consent to be admitted to [facility].    This writer called [facility] to give report. Spoke with Greg

## 2022-11-07 NOTE — CASE MANAGEMENT/SOCIAL WORK
"Ashley  From [facility] called. She said she is still waiting on the doctor to get back with her but she \"doubts that they will accept.\" She said they only had a double occupancy room with 1 of the 2 beds available and with the patient \"hallucinating and having a hx: of aggression. he more than likely would not be an appropriate admission for them at this time.\"  "

## 2022-11-07 NOTE — CASE MANAGEMENT/SOCIAL WORK
Spoke w/ guardian, Rahul Wilson and informed him that ambulance is scheduled to transport patient to [facility] around 1730. He stated that he wants CCP to leave a VM for him once patient has left [facility] ER.

## 2022-11-07 NOTE — ED PROVIDER NOTES
ED Course as of 11/07/22 1548   Sun Nov 06, 2022   0404 CT head shows no acute process [RC]   0625 Patient is now stating he is suicidal and he is willing to comply and cooperate with the initial blood draw and UA so that he may have a psychiatric evaluation [RC]   0700 Behavioral health eval pending.  Patient turned over to Cortney Snyder PA-C for final disposition.   [RC]   0830 [facility] behavioral [facility] RN has evaluated the patient at the bedside and discussed the patient presentation with Dr. Bandar Walters who recommends inpatient admission to a facility.  I have placed the patient on a 72-hour hold, additional labs and COVID screening ordered and they will seek placement for the patient. [KA]   1056 Course of care assumed by Hoda Daily PA-C pending inpatient behavioral health placement.  [KA]   1613 Assumed care at 1613 pending inpatient behavioral health placement.  Pt resting quietly in no distress.  Sitter at bedside.  [EW]   1725 Update from [facility]; [place] Williams is reviewing his records  [EW]   1947 Transfer of care back to Mj Gordillo PA-C pending behavioral health placement.  [EW]   2120 Patient was turned back over to me at 2000 this evening.  He is resting quietly and in no acute distress.  Apparently we are waiting for behavioral health to find placement for this patient. [RC]   Mon Nov 07, 2022   0607 Was contacted by access RN staff.  I have been asked to place a inpatient consultation to Dr. Mobley in hopes of speeding up disposition and plan of care. [RC]   1245 I discussed the patient with Dr. Rg who has evaluated the patient at the bedside.  He states they are still trying to find placement for him.  He states the patient does not need a sitter and 72-hour hold to be discontinued.    I will discuss this regarding hospital policy with the charge nurse. [KA]   1547 The patient has been accepted in transfer by Dr. Castillo at the [facility] location.  He has been calm and cooperative  throughout his ER course. [KA]      ED Course User Index  [EW] Karina Amos APRN  [KA] Keara Snyder PA  [RC] Schuyler Gordillo III, PA Arias, Kimberly, PA  11/07/22 154

## 2022-11-07 NOTE — CASE MANAGEMENT/SOCIAL WORK
Deric Lawrence -Pt accepted for inpatient psych admission at [facility]     Dr Castillo is accepting doctor    Call [phone] and ask for 300/AP1 to give report    Info given to Marline GONSALEZ in ED

## 2022-11-07 NOTE — PROGRESS NOTES
"[facility] follow up d/t psychosis and SI. Reviewed chart, spoke with RN and met with Pt. RN reports no changes and states pt has been \"chatting up a storm\". Pt was RIB upon entry watching TV, sitter in door way. Pt was A&Ox4, only knew month and year. Pt rated both anxiety and depression as a \"8\". Pt states having \"little bit of SI\" and states \"maybe\" regarding plans to harm self \"either by drinking or using drugs until dead\". Pt denies HI towards others \"unless they come after me first\". Pt states he always has \"wish to be dead\". He identifies auditory and visual hallucinations \"hear people say they are out to get me, they are following me, threatening my life\". Pt reports after recent discharge from  [facility] took psychotropic medications for 3-4 days and then got upset with other people trying to steal his things and then discontinued use of medications. Provided firm but appropriate psychoeducation and encouragement to continue taking medications despite actions or fears of other people. Pt was agreeable. Pt stated wanting to speak with his guardian and encouraged pt to request for a phone to contact them. Pt requested needing more clothes also, this writer directed pt to speak with guardian about this as well.   [facility] following.  "

## 2022-11-07 NOTE — CASE MANAGEMENT/SOCIAL WORK
Spoke with Mirtha at [facility]. They do have beds. I have faxed a packet for review.

## 2022-11-07 NOTE — CASE MANAGEMENT/SOCIAL WORK
"Lizet at [facility] called. She said the patient was \"out of network\" so they declined the patient.  "

## 2022-11-07 NOTE — CASE MANAGEMENT/SOCIAL WORK
Spoke with Kristi (ED RN). Updated her on efforts to secure a bed. Waiting to hear from [facility] and [facility]. If neither of these can admit, we may be waiting until a.m. and recheck with [facility] and the [facility] after a.m. discharges and a potential bed opens up.

## 2022-11-07 NOTE — CASE MANAGEMENT/SOCIAL WORK
Spoke with Amada at [facility] ([facility]). She said they have one bed left and are assessing someone. She said I could fax the packet for review, just in case the other percon doesn't need to be admitted. Done.

## 2022-11-07 NOTE — CASE MANAGEMENT/SOCIAL WORK
Continued Stay Note  [facility]     Patient Name: David Villalta  MRN: [id number]  Today's Date: 11/7/2022    Admit Date: 11/6/2022        Discharge Plan     Row Name 11/07/22 1327       Plan    Plan Comments Notified by ED CCP manager that pt has a guardian.  Guardianship documentation reviewed.  Call placed to [facility] [phone] due to discrepancy in contact information vs documentation.  Spoke with Lizet Hou.  She confirms that [facility] is guardian for Mr. Villalta and that Maximino Steve is the worker.  She confirms his contact number.  She provided the updated court paperwork.  Copy given to ED RN for inclusion in pt's paper chart.  Copy faxed to HIM stat line for scan to [facility].  [facility] ED CCP updated.  Per Lizet, pt is not homeless but chooses not to stay at his boarding home.  Pt's address updated.  Lizet spoke with Maximino Wilson and states that he is on his way to the ED to see pt himself.  Call placed to [facility] and notified Marty manager, that [facility] is requested to call pt's guardian as well for update.  Lisa ACEVES ED CCP manager, ED RN, and ED CCP [facility] updated.  Iman CRESPO-WALLACE               Discharge Codes    No documentation.                     Iman Smith RN

## 2023-01-18 NOTE — SIGNIFICANT NOTE
11/04/21 1130   OTHER   Discipline physical therapy assistant   Rehab Time/Intention   Session Not Performed patient/family declined treatment  (pt declined PT treatment today stating that he feels fine and has been getting up and ambulating to bathroom and showered on his own. Pt reports that he does not need PT at this time but will continue to follow pt.)   Recommendation   PT - Next Appointment 11/05/21      Hydroxyzine Counseling: Patient advised that the medication is sedating and not to drive a car after taking this medication.  Patient informed of potential adverse effects including but not limited to dry mouth, urinary retention, and blurry vision.  The patient verbalized understanding of the proper use and possible adverse effects of hydroxyzine.  All of the patient's questions and concerns were addressed.

## 2023-01-21 ENCOUNTER — APPOINTMENT (OUTPATIENT)
Dept: CT IMAGING | Facility: HOSPITAL | Age: 58
End: 2023-01-21
Payer: MEDICAID

## 2023-01-21 ENCOUNTER — HOSPITAL ENCOUNTER (EMERGENCY)
Facility: HOSPITAL | Age: 58
Discharge: HOME | End: 2023-01-22
Attending: EMERGENCY MEDICINE | Admitting: EMERGENCY MEDICINE
Payer: MEDICAID

## 2023-01-21 DIAGNOSIS — F10.10 ALCOHOL ABUSE: Primary | ICD-10-CM

## 2023-01-21 LAB
ALBUMIN SERPL-MCNC: 4 G/DL (ref 3.5–5.2)
ALBUMIN/GLOB SERPL: 1.1 G/DL
ALP SERPL-CCNC: 80 U/L (ref 39–117)
ALT SERPL W P-5'-P-CCNC: 20 U/L (ref 1–41)
ANION GAP SERPL CALCULATED.3IONS-SCNC: 14.5 MMOL/L (ref 5–15)
AST SERPL-CCNC: 39 U/L (ref 1–40)
BASOPHILS # BLD AUTO: 0.03 10*3/MM3 (ref 0–0.2)
BASOPHILS NFR BLD AUTO: 0.3 % (ref 0–1.5)
BILIRUB SERPL-MCNC: <0.2 MG/DL (ref 0–1.2)
BUN SERPL-MCNC: 12 MG/DL (ref 6–20)
BUN/CREAT SERPL: 12.6 (ref 7–25)
CALCIUM SPEC-SCNC: 8.9 MG/DL (ref 8.6–10.5)
CHLORIDE SERPL-SCNC: 101 MMOL/L (ref 98–107)
CO2 SERPL-SCNC: 25.5 MMOL/L (ref 22–29)
CREAT SERPL-MCNC: 0.95 MG/DL (ref 0.76–1.27)
DEPRECATED RDW RBC AUTO: 55 FL (ref 37–54)
EGFRCR SERPLBLD CKD-EPI 2021: 94.5 ML/MIN/1.73
EOSINOPHIL # BLD AUTO: 0.14 10*3/MM3 (ref 0–0.4)
EOSINOPHIL NFR BLD AUTO: 1.5 % (ref 0.3–6.2)
ERYTHROCYTE [DISTWIDTH] IN BLOOD BY AUTOMATED COUNT: 17.4 % (ref 12.3–15.4)
ETHANOL BLD-MCNC: 359 MG/DL (ref 0–10)
ETHANOL UR QL: 0.36 %
GLOBULIN UR ELPH-MCNC: 3.6 GM/DL
GLUCOSE SERPL-MCNC: 112 MG/DL (ref 65–99)
HCT VFR BLD AUTO: 35.7 % (ref 37.5–51)
HGB BLD-MCNC: 11.9 G/DL (ref 13–17.7)
IMM GRANULOCYTES # BLD AUTO: 0.03 10*3/MM3 (ref 0–0.05)
IMM GRANULOCYTES NFR BLD AUTO: 0.3 % (ref 0–0.5)
LYMPHOCYTES # BLD AUTO: 2.29 10*3/MM3 (ref 0.7–3.1)
LYMPHOCYTES NFR BLD AUTO: 23.9 % (ref 19.6–45.3)
MCH RBC QN AUTO: 29.2 PG (ref 26.6–33)
MCHC RBC AUTO-ENTMCNC: 33.3 G/DL (ref 31.5–35.7)
MCV RBC AUTO: 87.7 FL (ref 79–97)
MONOCYTES # BLD AUTO: 0.87 10*3/MM3 (ref 0.1–0.9)
MONOCYTES NFR BLD AUTO: 9.1 % (ref 5–12)
NEUTROPHILS NFR BLD AUTO: 6.22 10*3/MM3 (ref 1.7–7)
NEUTROPHILS NFR BLD AUTO: 64.9 % (ref 42.7–76)
NRBC BLD AUTO-RTO: 0 /100 WBC (ref 0–0.2)
PLATELET # BLD AUTO: 475 10*3/MM3 (ref 140–450)
PMV BLD AUTO: 9.1 FL (ref 6–12)
POTASSIUM SERPL-SCNC: 4.1 MMOL/L (ref 3.5–5.2)
PROT SERPL-MCNC: 7.6 G/DL (ref 6–8.5)
RBC # BLD AUTO: 4.07 10*6/MM3 (ref 4.14–5.8)
SODIUM SERPL-SCNC: 141 MMOL/L (ref 136–145)
WBC NRBC COR # BLD: 9.58 10*3/MM3 (ref 3.4–10.8)

## 2023-01-21 PROCEDURE — 80053 COMPREHEN METABOLIC PANEL: CPT | Performed by: EMERGENCY MEDICINE

## 2023-01-21 PROCEDURE — 25010000002 THIAMINE PER 100 MG: Performed by: EMERGENCY MEDICINE

## 2023-01-21 PROCEDURE — 99284 EMERGENCY DEPT VISIT MOD MDM: CPT

## 2023-01-21 PROCEDURE — 70450 CT HEAD/BRAIN W/O DYE: CPT

## 2023-01-21 PROCEDURE — 85025 COMPLETE CBC W/AUTO DIFF WBC: CPT | Performed by: EMERGENCY MEDICINE

## 2023-01-21 PROCEDURE — 82077 ASSAY SPEC XCP UR&BREATH IA: CPT | Performed by: EMERGENCY MEDICINE

## 2023-01-21 PROCEDURE — 96365 THER/PROPH/DIAG IV INF INIT: CPT

## 2023-01-21 RX ORDER — SODIUM CHLORIDE 0.9 % (FLUSH) 0.9 %
10 SYRINGE (ML) INJECTION AS NEEDED
Status: DISCONTINUED | OUTPATIENT
Start: 2023-01-21 | End: 2023-01-22 | Stop reason: HOSPADM

## 2023-01-21 RX ADMIN — FOLIC ACID 1000 ML/HR: 5 INJECTION, SOLUTION INTRAMUSCULAR; INTRAVENOUS; SUBCUTANEOUS at 21:31

## 2023-01-22 VITALS
TEMPERATURE: 97.5 F | HEART RATE: 104 BPM | SYSTOLIC BLOOD PRESSURE: 118 MMHG | DIASTOLIC BLOOD PRESSURE: 73 MMHG | OXYGEN SATURATION: 98 % | RESPIRATION RATE: 18 BRPM

## 2023-01-22 NOTE — CASE MANAGEMENT/SOCIAL WORK
Patient with legal guardian on file and is a [place] of the State. Registration obtained consent to treat from after hours Guardianship Office, spoke with Kell Ivey. Paperwork is on file in Epic. Per policy, contacted Lisa WEBB RN CCP manager to inform. LAURA StormN RN

## 2023-01-22 NOTE — ED PROVIDER NOTES
" EMERGENCY DEPARTMENT ENCOUNTER    Room Number:  20/20  Date seen:  1/22/2023  PCP: Provider, No Known  Historian: Paramedics      HPI:  Chief Complaint: EtOH, AMS  A complete HPI/ROS/PMH/PSH/SH/FH are unobtainable due to: Patient heavily intoxicated  Context: David Villalta is a 57 y.o. male who presents to the ED via EMS after he was found down on [address] in an altered/similarly unresponsive state.  Patient is reportedly homeless.  Patient tells me that he has drank \"a lot\" of alcohol tonight.  Little else is known about his presentation this evening.  Apparently bystanders called EMS when they observed this patient to be \"found down\" on the street.  Medical records indicate he takes Eliquis and has a history of pulmonary embolism.  It is unknown if he is compliant with these medications.  Medical record also indicates patient has history of hepatitis C, hyponatremia, history of drug abuse and bipolar disorder.  It is unknown if he has suffered any recent injuries.      PAST MEDICAL HISTORY  Active Ambulatory Problems     Diagnosis Date Noted   • History of DVT (deep vein thrombosis) 09/13/2021   • Hyponatremia 09/13/2021   • Alcohol dependence (HCC) 09/13/2021   • Esophagitis 09/15/2021   • Gastritis and duodenitis 09/15/2021   • Cellulitis 10/29/2021   • Substance use disorder 10/30/2021   • Overweight 10/30/2021   • Hypokalemia 10/30/2021   • Hypophosphatemia 10/31/2021   • Intertriginous candidiasis 10/31/2021   • Bipolar disorder (HCC) 10/31/2021   • Homeless 10/31/2021   • Cellulitis of left lower extremity without foot 10/18/2022     Resolved Ambulatory Problems     Diagnosis Date Noted   • Hematemesis with nausea 09/13/2021   • Esophageal thickening 09/13/2021   • Leukocytosis 09/13/2021     Past Medical History:   Diagnosis Date   • Anxiety    • Depression    • Hepatitis C    • Hx of drug abuse (HCC)    • Pulmonary embolism (HCC)          PAST SURGICAL HISTORY  Past Surgical History:   Procedure " "Laterality Date   • ANKLE SURGERY     • ENDOSCOPY N/A 9/14/2021    Procedure: ESOPHAGOGASTRODUODENOSCOPY WITH COLD BIOPSIES;  Surgeon: Allyson Magaña MD;  Location[facility] ENDOSCOPY;  Service: Gastroenterology;  Laterality: N/A;  PRE: HEMETEMESIS,  ESOPHAGEAL THICKENING  POST:ESOPHAGITIS, GASTRITIS, DUODENITIS, EROSION   • SPLENECTOMY           FAMILY HISTORY  History reviewed. No pertinent family history.      SOCIAL HISTORY  Social History     Socioeconomic History   • Marital status: Single   Tobacco Use   • Smoking status: Every Day     Packs/day: 0.50     Types: Cigarettes   • Smokeless tobacco: Never   Vaping Use   • Vaping Use: Some days   • Substances: Nicotine   • Devices: Disposable   Substance and Sexual Activity   • Alcohol use: Yes     Comment: \"goes ball to the wall, I drink excessively\"   • Drug use: Yes     Types: IV, Cocaine(coke), Methamphetamines     Comment: \"meth\"   • Sexual activity: Defer         ALLERGIES  Penicillin g        REVIEW OF SYSTEMS  Review of Systems   Unable to perform ROS: Mental status change   Allergic/Immunologic: Positive for food allergies.         PHYSICAL EXAM  ED Triage Vitals [01/21/23 1908]   Temp Heart Rate Resp BP SpO2   97.5 °F (36.4 °C) 94 18 135/89 99 %      Temp src Heart Rate Source Patient Position BP Location FiO2 (%)   Tympanic Monitor Lying Right arm --       Physical Exam      GENERAL: Heavily intoxicated, disheveled  HENT: nares patent, normocephalic and atraumatic, mucous membranes are dry and there appears to be some vomitus or debris around the nasopharynx.  EYES: no scleral icterus, EOMI  CV: regular rhythm, normal rate, normal pulses  RESPIRATORY: normal effort, few scattered wheezes bilaterally, no stridor,  ABDOMEN: soft, no focal areas of tenderness  MUSCULOSKELETAL:  no deformity  NEURO: alert, moves all extremities, slurred speech, no obvious focal motor deficit apparent  PSYCH:  Poor insight and judgment, poor eye contact   SKIN: warm, " dry    Vital signs and nursing notes reviewed.          LAB RESULTS  Recent Results (from the past 24 hour(s))   Comprehensive Metabolic Panel    Collection Time: 01/21/23  7:31 PM    Specimen: Blood   Result Value Ref Range    Glucose 112 (H) 65 - 99 mg/dL    BUN 12 6 - 20 mg/dL    Creatinine 0.95 0.76 - 1.27 mg/dL    Sodium 141 136 - 145 mmol/L    Potassium 4.1 3.5 - 5.2 mmol/L    Chloride 101 98 - 107 mmol/L    CO2 25.5 22.0 - 29.0 mmol/L    Calcium 8.9 8.6 - 10.5 mg/dL    Total Protein 7.6 6.0 - 8.5 g/dL    Albumin 4.0 3.5 - 5.2 g/dL    ALT (SGPT) 20 1 - 41 U/L    AST (SGOT) 39 1 - 40 U/L    Alkaline Phosphatase 80 39 - 117 U/L    Total Bilirubin <0.2 0.0 - 1.2 mg/dL    Globulin 3.6 gm/dL    A/G Ratio 1.1 g/dL    BUN/Creatinine Ratio 12.6 7.0 - 25.0    Anion Gap 14.5 5.0 - 15.0 mmol/L    eGFR 94.5 >60.0 mL/min/1.73   Ethanol    Collection Time: 01/21/23  7:31 PM    Specimen: Blood   Result Value Ref Range    Ethanol 359 (H) 0 - 10 mg/dL    Ethanol % 0.359 %   CBC Auto Differential    Collection Time: 01/21/23  7:31 PM    Specimen: Blood   Result Value Ref Range    WBC 9.58 3.40 - 10.80 10*3/mm3    RBC 4.07 (L) 4.14 - 5.80 10*6/mm3    Hemoglobin 11.9 (L) 13.0 - 17.7 g/dL    Hematocrit 35.7 (L) 37.5 - 51.0 %    MCV 87.7 79.0 - 97.0 fL    MCH 29.2 26.6 - 33.0 pg    MCHC 33.3 31.5 - 35.7 g/dL    RDW 17.4 (H) 12.3 - 15.4 %    RDW-SD 55.0 (H) 37.0 - 54.0 fl    MPV 9.1 6.0 - 12.0 fL    Platelets 475 (H) 140 - 450 10*3/mm3    Neutrophil % 64.9 42.7 - 76.0 %    Lymphocyte % 23.9 19.6 - 45.3 %    Monocyte % 9.1 5.0 - 12.0 %    Eosinophil % 1.5 0.3 - 6.2 %    Basophil % 0.3 0.0 - 1.5 %    Immature Grans % 0.3 0.0 - 0.5 %    Neutrophils, Absolute 6.22 1.70 - 7.00 10*3/mm3    Lymphocytes, Absolute 2.29 0.70 - 3.10 10*3/mm3    Monocytes, Absolute 0.87 0.10 - 0.90 10*3/mm3    Eosinophils, Absolute 0.14 0.00 - 0.40 10*3/mm3    Basophils, Absolute 0.03 0.00 - 0.20 10*3/mm3    Immature Grans, Absolute 0.03 0.00 - 0.05 10*3/mm3     nRBC 0.0 0.0 - 0.2 /100 WBC       Ordered the above labs and reviewed the results.        RADIOLOGY  CT Head Without Contrast    Result Date: 1/21/2023  CT HEAD WITHOUT CONTRAST  HISTORY: Unresponsive  COMPARISON: None available.  TECHNIQUE: Axial CT imaging was obtained through the brain. No IV contrast was administered.  FINDINGS: No acute intracranial hemorrhage is seen. There is diffuse atrophy. There is periventricular and deep white matter microangiopathic change. There is no midline shift or mass effect. Mucosal thickening is noted within the ethmoid sinuses. Mastoid air cells are clear.      No acute intracranial findings.  Radiation dose reduction techniques were utilized, including automated exposure control and exposure modulation based on body size.  This report was finalized on 1/21/2023 9:02 PM by Dr. Mariaa Bryant M.D.        Ordered the above noted radiological studies. Reviewed by me in PACS.            PROCEDURES  Procedures    MEDICATIONS GIVEN IN ER  Medications   sodium chloride 0.9 % flush 10 mL (has no administration in time range)   thiamine (B-1) 100 mg, folic acid 1 mg in sodium chloride 0.9 % 1,000 mL infusion (0 mL/hr Intravenous Stopped 1/21/23 2220)             MEDICAL DECISION MAKING, PROGRESS, and CONSULTS    All labs have been independently reviewed by me.  All radiology studies have been reviewed by me and I have also reviewed the radiology report.   EKG's independently viewed and interpreted by me.  Discussion below represents my analysis of pertinent findings related to patient's condition, differential diagnosis, treatment plan and final disposition.      Additional sources:  - Discussed/ obtained information from independent historians: Report received directly from paramedics at time of arrival    - External (non-ED) record review: I reviewed patient's most recent discharge summary from October 2022 when he was admitted for alcohol abuse, hypokalemia, and cellulitis of the  left lower extremity    - Chronic or social conditions impacting care: Patient is homeless history of alcohol abuse.      Orders placed during this visit:  Orders Placed This Encounter   Procedures   • CT Head Without Contrast   • Comprehensive Metabolic Panel   • Urinalysis With Microscopic If Indicated (No Culture) - Urine, Clean Catch   • Urine Drug Screen - Urine, Clean Catch   • Ethanol   • CBC Auto Differential   • Monitor Blood Pressure   • Pulse Oximetry, Continuous   • Insert Peripheral IV   • CBC & Differential             Differential diagnosis:    My differential diagnosis for altered mental status includes but is not limited to:  Hypoglycemia, hyperglycemia, DKA, overdose, ethanol intoxication, thiamine deficiency, niacin deficiency, hypothymia, hyperviscosity, Edd’s disease, hyponatremia, hypernatremia, liver failure, kidney failure, hyper or hypothyroid, no insufficiency, hypoxia, hypercarbia, carbon monoxide poisoning, postanoxic encephalopathy, ischemic stroke, intracranial bleed, subarachnoid hemorrhage, brain tumor, closed head injury, epidural hematoma, epidural hematoma, seizure activity, postictal state, syncopal episode, disseminated encephalomyelitis, central pontine myelinolysis, post cardiac arrest, bacterial meningitis, viral meningitis, fungal meningitis, encephalitis, brain abscess, subdural empyema, hysteria, catatonic state, malingering, hypertensive encephalopathy, vasculitis, TTP, DIC      Independent interpretation of labs, radiology studies, and discussions with consultants:  ED Course as of 01/22/23 0155   Sat Jan 21, 2023 1926 Patient appears heavily intoxicated which I presume is the most likely explanation for his altered mentation today.  Nevertheless we will proceed with typical metabolic work-up and also obtain a CT of the head because he is anticoagulated and I cannot rule out the possibility of a fall with head injury. [ROSA]   2100 Ethanol(!): 359 [ROSA]   2100 Patient  "resting calmly.  No significant changes in condition [ROSA]   2209 Head CT is reassuring without any evidence of intracranial injury [ROSA]   2315 Still resting comfortably.  Vital signs remain normal. [ROSA]   Sun Jan 22, 2023   0144 Just reassessed the patient.  He awoke easily with verbal stimulation and had a conversation with me.  He is acting more sober now and asking for something to drink and eat.  He has no new complaints . we will continue to monitor him carefully. [ROSA]   0153 Patient now obviously much more awake and alert.  He took out his IV and is insisting that he is ready to go.  I asked him where he is going to go and he says he is going to \"go back to the streets.\"  He is a [place] of the state and we have already spoken with representatives there who say he he will likely refuse any other recommendation and therefore we are able to disposition him as we see fit.  At this point in time, I have no medical reason to hold him here against his will and therefore we will discharge him as he is requesting. [ROSA]      ED Course User Index  [ROSA] Thang Castro MD             Patient was placed in face mask during triage.  Patient was wearing face mask throughout encounter.  I wore personal protective equipment throughout the encounter.  Hand hygiene was performed before and after patient encounter.     DIAGNOSIS  Final diagnoses:   Alcohol abuse         DISPOSITION  Discharge            Latest Documented Vital Signs:  As of 01:55 EST  BP- 118/73 HR- 104 Temp- 97.5 °F (36.4 °C) (Tympanic) O2 sat- 98%              --    Please note that portions of this were completed with a voice recognition program.       Note Disclaimer: At [facility], we believe that sharing information builds trust and better relationships. You are receiving this note because you are receiving care at [facility] or recently visited. It is possible you will see health information before a provider has talked with you about it. This kind " of information can be easy to misunderstand. To help you fully understand what it means for your health, we urge you to discuss this note with your provider.           Thang Castro MD  01/22/23 0155

## 2023-01-22 NOTE — CASE MANAGEMENT/SOCIAL WORK
Spoke with after hours Guardianship Office to verify that patient is indeed homeless. They report that he is and they give consent for him to be discharged to a shelter, if patient chooses to go to one. They would like to be updated with patient's disposition from ER. LAURA StormN RN

## 2023-01-25 ENCOUNTER — HOSPITAL ENCOUNTER (EMERGENCY)
Facility: HOSPITAL | Age: 58
Discharge: HOME | End: 2023-01-26
Attending: EMERGENCY MEDICINE | Admitting: EMERGENCY MEDICINE
Payer: MEDICAID

## 2023-01-25 DIAGNOSIS — F10.10 ALCOHOL ABUSE: Primary | ICD-10-CM

## 2023-01-25 DIAGNOSIS — N50.89 SCROTAL IRRITATION: ICD-10-CM

## 2023-01-25 DIAGNOSIS — F15.10 METHAMPHETAMINE ABUSE: ICD-10-CM

## 2023-01-25 LAB
EXPIRATION DATE: NORMAL
FECAL OCCULT BLOOD SCREEN, POC: NEGATIVE
LIPASE SERPL-CCNC: 69 U/L (ref 13–60)
Lab: 204
NEGATIVE CONTROL: NEGATIVE
POSITIVE CONTROL: POSITIVE

## 2023-01-25 PROCEDURE — 82270 OCCULT BLOOD FECES: CPT | Performed by: EMERGENCY MEDICINE

## 2023-01-25 PROCEDURE — 83690 ASSAY OF LIPASE: CPT | Performed by: EMERGENCY MEDICINE

## 2023-01-25 PROCEDURE — 25010000002 THIAMINE PER 100 MG: Performed by: EMERGENCY MEDICINE

## 2023-01-25 PROCEDURE — 25010000002 LORAZEPAM PER 2 MG: Performed by: EMERGENCY MEDICINE

## 2023-01-25 PROCEDURE — 36415 COLL VENOUS BLD VENIPUNCTURE: CPT

## 2023-01-25 PROCEDURE — 96365 THER/PROPH/DIAG IV INF INIT: CPT

## 2023-01-25 PROCEDURE — 96375 TX/PRO/DX INJ NEW DRUG ADDON: CPT

## 2023-01-25 PROCEDURE — 99284 EMERGENCY DEPT VISIT MOD MDM: CPT

## 2023-01-25 RX ORDER — CHLORDIAZEPOXIDE HYDROCHLORIDE 10 MG/1
10 CAPSULE, GELATIN COATED ORAL ONCE
Status: DISCONTINUED | OUTPATIENT
Start: 2023-01-25 | End: 2023-01-26

## 2023-01-25 RX ORDER — LORAZEPAM 2 MG/ML
1 INJECTION INTRAMUSCULAR ONCE
Status: COMPLETED | OUTPATIENT
Start: 2023-01-25 | End: 2023-01-25

## 2023-01-25 RX ORDER — SODIUM CHLORIDE 0.9 % (FLUSH) 0.9 %
10 SYRINGE (ML) INJECTION AS NEEDED
Status: DISCONTINUED | OUTPATIENT
Start: 2023-01-25 | End: 2023-01-26 | Stop reason: HOSPADM

## 2023-01-25 RX ADMIN — SODIUM CHLORIDE, POTASSIUM CHLORIDE, SODIUM LACTATE AND CALCIUM CHLORIDE 1000 ML: 600; 310; 30; 20 INJECTION, SOLUTION INTRAVENOUS at 23:32

## 2023-01-25 RX ADMIN — LORAZEPAM 1 MG: 2 INJECTION INTRAMUSCULAR; INTRAVENOUS at 23:36

## 2023-01-25 RX ADMIN — THIAMINE HYDROCHLORIDE 100 MG: 100 INJECTION, SOLUTION INTRAMUSCULAR; INTRAVENOUS at 23:39

## 2023-01-26 VITALS
HEIGHT: 69 IN | RESPIRATION RATE: 18 BRPM | TEMPERATURE: 97 F | SYSTOLIC BLOOD PRESSURE: 136 MMHG | WEIGHT: 170 LBS | BODY MASS INDEX: 25.18 KG/M2 | DIASTOLIC BLOOD PRESSURE: 78 MMHG | HEART RATE: 110 BPM | OXYGEN SATURATION: 98 %

## 2023-01-26 LAB
ALBUMIN SERPL-MCNC: 3.7 G/DL (ref 3.5–5.2)
ALBUMIN/GLOB SERPL: 1.4 G/DL
ALP SERPL-CCNC: 74 U/L (ref 39–117)
ALT SERPL W P-5'-P-CCNC: 16 U/L (ref 1–41)
AMPHET+METHAMPHET UR QL: POSITIVE
ANION GAP SERPL CALCULATED.3IONS-SCNC: 14 MMOL/L (ref 5–15)
AST SERPL-CCNC: 39 U/L (ref 1–40)
BARBITURATES UR QL SCN: NEGATIVE
BASOPHILS # BLD AUTO: 0.03 10*3/MM3 (ref 0–0.2)
BASOPHILS NFR BLD AUTO: 0.2 % (ref 0–1.5)
BENZODIAZ UR QL SCN: NEGATIVE
BILIRUB SERPL-MCNC: 0.9 MG/DL (ref 0–1.2)
BILIRUB UR QL STRIP: NEGATIVE
BUN SERPL-MCNC: 9 MG/DL (ref 6–20)
BUN/CREAT SERPL: 12.5 (ref 7–25)
CALCIUM SPEC-SCNC: 8.4 MG/DL (ref 8.6–10.5)
CANNABINOIDS SERPL QL: NEGATIVE
CHLORIDE SERPL-SCNC: 99 MMOL/L (ref 98–107)
CLARITY UR: CLEAR
CO2 SERPL-SCNC: 22 MMOL/L (ref 22–29)
COCAINE UR QL: NEGATIVE
COLOR UR: YELLOW
CREAT SERPL-MCNC: 0.72 MG/DL (ref 0.76–1.27)
DEPRECATED RDW RBC AUTO: 54.4 FL (ref 37–54)
EGFRCR SERPLBLD CKD-EPI 2021: 107.9 ML/MIN/1.73
EOSINOPHIL # BLD AUTO: 0.06 10*3/MM3 (ref 0–0.4)
EOSINOPHIL NFR BLD AUTO: 0.4 % (ref 0.3–6.2)
ERYTHROCYTE [DISTWIDTH] IN BLOOD BY AUTOMATED COUNT: 17.6 % (ref 12.3–15.4)
ETHANOL BLD-MCNC: 35 MG/DL (ref 0–10)
ETHANOL UR QL: 0.04 %
GLOBULIN UR ELPH-MCNC: 2.7 GM/DL
GLUCOSE SERPL-MCNC: 69 MG/DL (ref 65–99)
GLUCOSE UR STRIP-MCNC: NEGATIVE MG/DL
HCT VFR BLD AUTO: 30.5 % (ref 37.5–51)
HGB BLD-MCNC: 9.9 G/DL (ref 13–17.7)
HGB UR QL STRIP.AUTO: NEGATIVE
HOLD SPECIMEN: NORMAL
HOLD SPECIMEN: NORMAL
IMM GRANULOCYTES # BLD AUTO: 0.06 10*3/MM3 (ref 0–0.05)
IMM GRANULOCYTES NFR BLD AUTO: 0.4 % (ref 0–0.5)
KETONES UR QL STRIP: ABNORMAL
LEUKOCYTE ESTERASE UR QL STRIP.AUTO: NEGATIVE
LYMPHOCYTES # BLD AUTO: 1.24 10*3/MM3 (ref 0.7–3.1)
LYMPHOCYTES NFR BLD AUTO: 8.7 % (ref 19.6–45.3)
MCH RBC QN AUTO: 28.2 PG (ref 26.6–33)
MCHC RBC AUTO-ENTMCNC: 32.5 G/DL (ref 31.5–35.7)
MCV RBC AUTO: 86.9 FL (ref 79–97)
METHADONE UR QL SCN: NEGATIVE
MONOCYTES # BLD AUTO: 1.09 10*3/MM3 (ref 0.1–0.9)
MONOCYTES NFR BLD AUTO: 7.7 % (ref 5–12)
NEUTROPHILS NFR BLD AUTO: 11.7 10*3/MM3 (ref 1.7–7)
NEUTROPHILS NFR BLD AUTO: 82.6 % (ref 42.7–76)
NITRITE UR QL STRIP: NEGATIVE
NRBC BLD AUTO-RTO: 0.1 /100 WBC (ref 0–0.2)
OPIATES UR QL: NEGATIVE
OXYCODONE UR QL SCN: NEGATIVE
PH UR STRIP.AUTO: 6.5 [PH] (ref 5–8)
PLATELET # BLD AUTO: 435 10*3/MM3 (ref 140–450)
PMV BLD AUTO: 9.4 FL (ref 6–12)
POTASSIUM SERPL-SCNC: 4.7 MMOL/L (ref 3.5–5.2)
PROT SERPL-MCNC: 6.4 G/DL (ref 6–8.5)
PROT UR QL STRIP: NEGATIVE
RBC # BLD AUTO: 3.51 10*6/MM3 (ref 4.14–5.8)
SODIUM SERPL-SCNC: 135 MMOL/L (ref 136–145)
SP GR UR STRIP: 1.01 (ref 1–1.03)
UROBILINOGEN UR QL STRIP: ABNORMAL
WBC NRBC COR # BLD: 14.18 10*3/MM3 (ref 3.4–10.8)
WHOLE BLOOD HOLD COAG: NORMAL
WHOLE BLOOD HOLD SPECIMEN: NORMAL

## 2023-01-26 PROCEDURE — 80307 DRUG TEST PRSMV CHEM ANLYZR: CPT | Performed by: EMERGENCY MEDICINE

## 2023-01-26 PROCEDURE — 81003 URINALYSIS AUTO W/O SCOPE: CPT | Performed by: EMERGENCY MEDICINE

## 2023-01-26 PROCEDURE — 90791 PSYCH DIAGNOSTIC EVALUATION: CPT

## 2023-01-26 PROCEDURE — 80053 COMPREHEN METABOLIC PANEL: CPT | Performed by: EMERGENCY MEDICINE

## 2023-01-26 PROCEDURE — 85025 COMPLETE CBC W/AUTO DIFF WBC: CPT | Performed by: EMERGENCY MEDICINE

## 2023-01-26 PROCEDURE — 82077 ASSAY SPEC XCP UR&BREATH IA: CPT | Performed by: EMERGENCY MEDICINE

## 2023-11-01 ENCOUNTER — INPATIENT HOSPITAL (OUTPATIENT)
Dept: URBAN - METROPOLITAN AREA HOSPITAL 107 | Facility: HOSPITAL | Age: 58
End: 2023-11-01
Payer: MEDICAID

## 2023-11-01 DIAGNOSIS — R93.3 ABNORMAL FINDINGS ON DIAGNOSTIC IMAGING OF OTHER PARTS OF DI: ICD-10-CM

## 2023-11-01 DIAGNOSIS — K92.0 HEMATEMESIS: ICD-10-CM

## 2023-11-01 PROCEDURE — 99222 1ST HOSP IP/OBS MODERATE 55: CPT | Performed by: PHYSICIAN ASSISTANT

## 2023-11-02 ENCOUNTER — INPATIENT HOSPITAL (OUTPATIENT)
Dept: URBAN - METROPOLITAN AREA HOSPITAL 107 | Facility: HOSPITAL | Age: 58
End: 2023-11-02
Payer: MEDICAID

## 2023-11-02 DIAGNOSIS — K92.0 HEMATEMESIS: ICD-10-CM

## 2023-11-02 DIAGNOSIS — F10.10 ALCOHOL ABUSE, UNCOMPLICATED: ICD-10-CM

## 2023-11-02 DIAGNOSIS — F15.10 OTHER STIMULANT ABUSE, UNCOMPLICATED: ICD-10-CM

## 2023-11-02 DIAGNOSIS — K76.0 FATTY (CHANGE OF) LIVER, NOT ELSEWHERE CLASSIFIED: ICD-10-CM

## 2023-11-02 PROCEDURE — 99232 SBSQ HOSP IP/OBS MODERATE 35: CPT | Performed by: PHYSICIAN ASSISTANT

## (undated) DEVICE — SENSR O2 OXIMAX FNGR A/ 18IN NONSTR

## (undated) DEVICE — FRCP BX RADJAW4 NDL 2.8 240CM LG OG BX40

## (undated) DEVICE — TUBING, SUCTION, 1/4" X 10', STRAIGHT: Brand: MEDLINE

## (undated) DEVICE — LN SMPL CO2 SHTRM SD STREAM W/M LUER

## (undated) DEVICE — KT ORCA ORCAPOD DISP STRL

## (undated) DEVICE — MSK ENDO PORT O2 POM ELITE CURAPLEX A/

## (undated) DEVICE — BITEBLOCK OMNI BLOC

## (undated) DEVICE — ADAPT CLN BIOGUARD AIR/H2O DISP

## (undated) DEVICE — CANN O2 ETCO2 FITS ALL CONN CO2 SMPL A/ 7IN DISP LF